# Patient Record
Sex: MALE | Race: WHITE | Employment: OTHER | URBAN - METROPOLITAN AREA
[De-identification: names, ages, dates, MRNs, and addresses within clinical notes are randomized per-mention and may not be internally consistent; named-entity substitution may affect disease eponyms.]

---

## 2017-01-13 ENCOUNTER — OFFICE VISIT (OUTPATIENT)
Dept: ORTHOPEDIC SURGERY | Age: 77
End: 2017-01-13

## 2017-01-13 VITALS — RESPIRATION RATE: 16 BRPM | BODY MASS INDEX: 29.82 KG/M2 | WEIGHT: 190 LBS | HEIGHT: 67 IN

## 2017-01-13 DIAGNOSIS — M72.0 DUPUYTREN'S CONTRACTURE: Primary | ICD-10-CM

## 2017-01-13 PROCEDURE — 4040F PNEUMOC VAC/ADMIN/RCVD: CPT | Performed by: ORTHOPAEDIC SURGERY

## 2017-01-13 PROCEDURE — 1123F ACP DISCUSS/DSCN MKR DOCD: CPT | Performed by: ORTHOPAEDIC SURGERY

## 2017-01-13 PROCEDURE — 99214 OFFICE O/P EST MOD 30 MIN: CPT | Performed by: ORTHOPAEDIC SURGERY

## 2017-01-13 PROCEDURE — G8484 FLU IMMUNIZE NO ADMIN: HCPCS | Performed by: ORTHOPAEDIC SURGERY

## 2017-01-13 PROCEDURE — G8427 DOCREV CUR MEDS BY ELIG CLIN: HCPCS | Performed by: ORTHOPAEDIC SURGERY

## 2017-01-13 PROCEDURE — 1036F TOBACCO NON-USER: CPT | Performed by: ORTHOPAEDIC SURGERY

## 2017-01-13 PROCEDURE — 3017F COLORECTAL CA SCREEN DOC REV: CPT | Performed by: ORTHOPAEDIC SURGERY

## 2017-01-13 PROCEDURE — G8420 CALC BMI NORM PARAMETERS: HCPCS | Performed by: ORTHOPAEDIC SURGERY

## 2017-03-01 ENCOUNTER — HOSPITAL ENCOUNTER (OUTPATIENT)
Dept: OTHER | Age: 77
Discharge: HOME | End: 2017-03-01
Attending: NURSE PRACTITIONER | Admitting: NURSE PRACTITIONER

## 2017-03-01 ENCOUNTER — OFFICE VISIT (OUTPATIENT)
Dept: FAMILY MEDICINE CLINIC | Age: 77
End: 2017-03-01

## 2017-03-01 VITALS
DIASTOLIC BLOOD PRESSURE: 58 MMHG | SYSTOLIC BLOOD PRESSURE: 128 MMHG | OXYGEN SATURATION: 98 % | HEART RATE: 69 BPM | RESPIRATION RATE: 18 BRPM | HEIGHT: 67 IN | BODY MASS INDEX: 29.98 KG/M2 | WEIGHT: 191 LBS

## 2017-03-01 DIAGNOSIS — I10 ESSENTIAL HYPERTENSION: Primary | ICD-10-CM

## 2017-03-01 DIAGNOSIS — M25.571 ACUTE RIGHT ANKLE PAIN: ICD-10-CM

## 2017-03-01 DIAGNOSIS — M25.471 RIGHT ANKLE SWELLING: ICD-10-CM

## 2017-03-01 DIAGNOSIS — R22.9 SKIN NODULE: ICD-10-CM

## 2017-03-01 DIAGNOSIS — E55.9 VITAMIN D DEFICIENCY: ICD-10-CM

## 2017-03-01 DIAGNOSIS — L98.9 BENIGN SKIN LESION OF NECK: ICD-10-CM

## 2017-03-01 DIAGNOSIS — R52 OTHER ACUTE PAIN: ICD-10-CM

## 2017-03-01 DIAGNOSIS — E78.00 HYPERCHOLESTEREMIA: ICD-10-CM

## 2017-03-01 DIAGNOSIS — R73.01 IMPAIRED FASTING GLUCOSE: ICD-10-CM

## 2017-03-01 LAB
A/G RATIO: 1.9 (ref 1.1–2.2)
ALBUMIN SERPL-MCNC: 4.1 G/DL (ref 3.4–5)
ALP BLD-CCNC: 65 U/L (ref 40–129)
ALT SERPL-CCNC: 17 U/L (ref 10–40)
ANION GAP SERPL CALCULATED.3IONS-SCNC: 13 MMOL/L (ref 3–16)
AST SERPL-CCNC: 19 U/L (ref 15–37)
BILIRUB SERPL-MCNC: 0.8 MG/DL (ref 0–1)
BUN BLDV-MCNC: 18 MG/DL (ref 7–20)
CALCIUM SERPL-MCNC: 9.5 MG/DL (ref 8.3–10.6)
CHLORIDE BLD-SCNC: 99 MMOL/L (ref 99–110)
CHOLESTEROL, TOTAL: 137 MG/DL (ref 0–199)
CO2: 28 MMOL/L (ref 21–32)
CREAT SERPL-MCNC: 0.9 MG/DL (ref 0.8–1.3)
ESTIMATED AVERAGE GLUCOSE: 114 MG/DL
GFR AFRICAN AMERICAN: >60
GFR NON-AFRICAN AMERICAN: >60
GLOBULIN: 2.2 G/DL
GLUCOSE BLD-MCNC: 110 MG/DL (ref 70–99)
HBA1C MFR BLD: 5.6 %
HDLC SERPL-MCNC: 68 MG/DL (ref 40–60)
LDL CHOLESTEROL CALCULATED: 58 MG/DL
POTASSIUM SERPL-SCNC: 4.1 MMOL/L (ref 3.5–5.1)
SODIUM BLD-SCNC: 140 MMOL/L (ref 136–145)
TOTAL PROTEIN: 6.3 G/DL (ref 6.4–8.2)
TRIGL SERPL-MCNC: 53 MG/DL (ref 0–150)
VITAMIN D 25-HYDROXY: 44.4 NG/ML
VLDLC SERPL CALC-MCNC: 11 MG/DL

## 2017-03-01 PROCEDURE — 17110 DESTRUCTION B9 LES UP TO 14: CPT | Performed by: NURSE PRACTITIONER

## 2017-03-01 PROCEDURE — 3017F COLORECTAL CA SCREEN DOC REV: CPT | Performed by: NURSE PRACTITIONER

## 2017-03-01 PROCEDURE — G8427 DOCREV CUR MEDS BY ELIG CLIN: HCPCS | Performed by: NURSE PRACTITIONER

## 2017-03-01 PROCEDURE — 36415 COLL VENOUS BLD VENIPUNCTURE: CPT | Performed by: NURSE PRACTITIONER

## 2017-03-01 PROCEDURE — 4040F PNEUMOC VAC/ADMIN/RCVD: CPT | Performed by: NURSE PRACTITIONER

## 2017-03-01 PROCEDURE — G8484 FLU IMMUNIZE NO ADMIN: HCPCS | Performed by: NURSE PRACTITIONER

## 2017-03-01 PROCEDURE — 1123F ACP DISCUSS/DSCN MKR DOCD: CPT | Performed by: NURSE PRACTITIONER

## 2017-03-01 PROCEDURE — 99214 OFFICE O/P EST MOD 30 MIN: CPT | Performed by: NURSE PRACTITIONER

## 2017-03-01 PROCEDURE — 1036F TOBACCO NON-USER: CPT | Performed by: NURSE PRACTITIONER

## 2017-03-01 PROCEDURE — G8420 CALC BMI NORM PARAMETERS: HCPCS | Performed by: NURSE PRACTITIONER

## 2017-03-01 RX ORDER — ENALAPRIL MALEATE 20 MG/1
TABLET ORAL
Qty: 180 TABLET | Refills: 3 | Status: SHIPPED | OUTPATIENT
Start: 2017-03-01 | End: 2018-03-07 | Stop reason: SDUPTHER

## 2017-03-01 RX ORDER — ETODOLAC 400 MG/1
400 TABLET, FILM COATED ORAL 2 TIMES DAILY
Qty: 60 TABLET | Refills: 3 | Status: SHIPPED | OUTPATIENT
Start: 2017-03-01 | End: 2017-03-01 | Stop reason: SDUPTHER

## 2017-03-01 RX ORDER — HYDROCHLOROTHIAZIDE 25 MG/1
TABLET ORAL
Qty: 90 TABLET | Refills: 3 | Status: SHIPPED | OUTPATIENT
Start: 2017-03-01 | End: 2018-03-07 | Stop reason: SDUPTHER

## 2017-03-01 RX ORDER — ETODOLAC 400 MG/1
400 TABLET, FILM COATED ORAL 2 TIMES DAILY
Qty: 180 TABLET | Refills: 1 | Status: SHIPPED | OUTPATIENT
Start: 2017-03-01 | End: 2017-07-05

## 2017-03-01 RX ORDER — LABETALOL 200 MG/1
TABLET, FILM COATED ORAL
Qty: 270 TABLET | Refills: 3 | Status: SHIPPED | OUTPATIENT
Start: 2017-03-01 | End: 2017-07-05 | Stop reason: DRUGHIGH

## 2017-03-01 RX ORDER — SIMVASTATIN 20 MG
TABLET ORAL
Qty: 90 TABLET | Refills: 3 | Status: SHIPPED | OUTPATIENT
Start: 2017-03-01 | End: 2018-03-07 | Stop reason: SDUPTHER

## 2017-03-03 DIAGNOSIS — M25.471 RIGHT ANKLE SWELLING: Primary | ICD-10-CM

## 2017-03-03 DIAGNOSIS — G89.29 CHRONIC PAIN OF RIGHT ANKLE: ICD-10-CM

## 2017-03-03 DIAGNOSIS — M25.571 CHRONIC PAIN OF RIGHT ANKLE: ICD-10-CM

## 2017-03-21 ENCOUNTER — OFFICE VISIT (OUTPATIENT)
Dept: ORTHOPEDIC SURGERY | Age: 77
End: 2017-03-21

## 2017-03-21 VITALS
BODY MASS INDEX: 29.98 KG/M2 | RESPIRATION RATE: 15 BRPM | HEIGHT: 67 IN | SYSTOLIC BLOOD PRESSURE: 137 MMHG | DIASTOLIC BLOOD PRESSURE: 58 MMHG | WEIGHT: 191 LBS | HEART RATE: 57 BPM

## 2017-03-21 DIAGNOSIS — M19.079 ANKLE ARTHRITIS: Primary | ICD-10-CM

## 2017-03-21 PROCEDURE — 1036F TOBACCO NON-USER: CPT | Performed by: ORTHOPAEDIC SURGERY

## 2017-03-21 PROCEDURE — G8420 CALC BMI NORM PARAMETERS: HCPCS | Performed by: ORTHOPAEDIC SURGERY

## 2017-03-21 PROCEDURE — G8484 FLU IMMUNIZE NO ADMIN: HCPCS | Performed by: ORTHOPAEDIC SURGERY

## 2017-03-21 PROCEDURE — G8427 DOCREV CUR MEDS BY ELIG CLIN: HCPCS | Performed by: ORTHOPAEDIC SURGERY

## 2017-03-21 PROCEDURE — 3017F COLORECTAL CA SCREEN DOC REV: CPT | Performed by: ORTHOPAEDIC SURGERY

## 2017-03-21 PROCEDURE — 4040F PNEUMOC VAC/ADMIN/RCVD: CPT | Performed by: ORTHOPAEDIC SURGERY

## 2017-03-21 PROCEDURE — 99213 OFFICE O/P EST LOW 20 MIN: CPT | Performed by: ORTHOPAEDIC SURGERY

## 2017-06-06 ENCOUNTER — OFFICE VISIT (OUTPATIENT)
Dept: ORTHOPEDIC SURGERY | Age: 77
End: 2017-06-06

## 2017-06-06 VITALS — WEIGHT: 191 LBS | HEIGHT: 67 IN | BODY MASS INDEX: 29.98 KG/M2

## 2017-06-06 DIAGNOSIS — M19.079 ANKLE ARTHRITIS: Primary | ICD-10-CM

## 2017-06-06 PROCEDURE — 99214 OFFICE O/P EST MOD 30 MIN: CPT | Performed by: ORTHOPAEDIC SURGERY

## 2017-06-06 PROCEDURE — 3017F COLORECTAL CA SCREEN DOC REV: CPT | Performed by: ORTHOPAEDIC SURGERY

## 2017-06-06 PROCEDURE — 1036F TOBACCO NON-USER: CPT | Performed by: ORTHOPAEDIC SURGERY

## 2017-06-06 PROCEDURE — 20605 DRAIN/INJ JOINT/BURSA W/O US: CPT | Performed by: ORTHOPAEDIC SURGERY

## 2017-06-06 PROCEDURE — G8427 DOCREV CUR MEDS BY ELIG CLIN: HCPCS | Performed by: ORTHOPAEDIC SURGERY

## 2017-06-06 PROCEDURE — G8420 CALC BMI NORM PARAMETERS: HCPCS | Performed by: ORTHOPAEDIC SURGERY

## 2017-06-06 PROCEDURE — 4040F PNEUMOC VAC/ADMIN/RCVD: CPT | Performed by: ORTHOPAEDIC SURGERY

## 2017-06-06 PROCEDURE — 1123F ACP DISCUSS/DSCN MKR DOCD: CPT | Performed by: ORTHOPAEDIC SURGERY

## 2017-07-05 ENCOUNTER — OFFICE VISIT (OUTPATIENT)
Dept: FAMILY MEDICINE CLINIC | Age: 77
End: 2017-07-05

## 2017-07-05 VITALS
OXYGEN SATURATION: 98 % | HEIGHT: 67 IN | HEART RATE: 71 BPM | SYSTOLIC BLOOD PRESSURE: 110 MMHG | RESPIRATION RATE: 16 BRPM | DIASTOLIC BLOOD PRESSURE: 58 MMHG | WEIGHT: 194 LBS | BODY MASS INDEX: 30.45 KG/M2

## 2017-07-05 DIAGNOSIS — R20.9 DISTURBANCE OF SKIN SENSATION: ICD-10-CM

## 2017-07-05 DIAGNOSIS — M72.2 PLANTAR FASCIITIS: ICD-10-CM

## 2017-07-05 DIAGNOSIS — I10 ESSENTIAL HYPERTENSION: Primary | ICD-10-CM

## 2017-07-05 DIAGNOSIS — L82.1 SK (SEBORRHEIC KERATOSIS): ICD-10-CM

## 2017-07-05 PROCEDURE — 3017F COLORECTAL CA SCREEN DOC REV: CPT | Performed by: NURSE PRACTITIONER

## 2017-07-05 PROCEDURE — G8427 DOCREV CUR MEDS BY ELIG CLIN: HCPCS | Performed by: NURSE PRACTITIONER

## 2017-07-05 PROCEDURE — 1123F ACP DISCUSS/DSCN MKR DOCD: CPT | Performed by: NURSE PRACTITIONER

## 2017-07-05 PROCEDURE — 1036F TOBACCO NON-USER: CPT | Performed by: NURSE PRACTITIONER

## 2017-07-05 PROCEDURE — 4040F PNEUMOC VAC/ADMIN/RCVD: CPT | Performed by: NURSE PRACTITIONER

## 2017-07-05 PROCEDURE — 17110 DESTRUCTION B9 LES UP TO 14: CPT | Performed by: NURSE PRACTITIONER

## 2017-07-05 PROCEDURE — G8417 CALC BMI ABV UP PARAM F/U: HCPCS | Performed by: NURSE PRACTITIONER

## 2017-07-05 PROCEDURE — 99213 OFFICE O/P EST LOW 20 MIN: CPT | Performed by: NURSE PRACTITIONER

## 2017-07-05 RX ORDER — LABETALOL 200 MG/1
TABLET, FILM COATED ORAL
Qty: 270 TABLET | Refills: 3 | Status: SHIPPED
Start: 2017-07-05 | End: 2018-03-07 | Stop reason: SDUPTHER

## 2017-07-05 ASSESSMENT — PATIENT HEALTH QUESTIONNAIRE - PHQ9
SUM OF ALL RESPONSES TO PHQ QUESTIONS 1-9: 0
SUM OF ALL RESPONSES TO PHQ9 QUESTIONS 1 & 2: 0
1. LITTLE INTEREST OR PLEASURE IN DOING THINGS: 0
2. FEELING DOWN, DEPRESSED OR HOPELESS: 0

## 2017-08-10 ENCOUNTER — OFFICE VISIT (OUTPATIENT)
Dept: ORTHOPEDIC SURGERY | Age: 77
End: 2017-08-10

## 2017-08-10 VITALS
DIASTOLIC BLOOD PRESSURE: 64 MMHG | BODY MASS INDEX: 30.45 KG/M2 | SYSTOLIC BLOOD PRESSURE: 132 MMHG | WEIGHT: 194 LBS | RESPIRATION RATE: 15 BRPM | HEART RATE: 58 BPM | HEIGHT: 67 IN

## 2017-08-10 DIAGNOSIS — M76.61 ACHILLES TENDINITIS OF RIGHT LOWER EXTREMITY: ICD-10-CM

## 2017-08-10 DIAGNOSIS — M19.079 ANKLE ARTHRITIS: Primary | ICD-10-CM

## 2017-08-10 PROCEDURE — G8427 DOCREV CUR MEDS BY ELIG CLIN: HCPCS | Performed by: ORTHOPAEDIC SURGERY

## 2017-08-10 PROCEDURE — 1036F TOBACCO NON-USER: CPT | Performed by: ORTHOPAEDIC SURGERY

## 2017-08-10 PROCEDURE — 4040F PNEUMOC VAC/ADMIN/RCVD: CPT | Performed by: ORTHOPAEDIC SURGERY

## 2017-08-10 PROCEDURE — 99214 OFFICE O/P EST MOD 30 MIN: CPT | Performed by: ORTHOPAEDIC SURGERY

## 2017-08-10 PROCEDURE — G8417 CALC BMI ABV UP PARAM F/U: HCPCS | Performed by: ORTHOPAEDIC SURGERY

## 2017-08-10 PROCEDURE — 3017F COLORECTAL CA SCREEN DOC REV: CPT | Performed by: ORTHOPAEDIC SURGERY

## 2017-08-10 PROCEDURE — 1123F ACP DISCUSS/DSCN MKR DOCD: CPT | Performed by: ORTHOPAEDIC SURGERY

## 2017-08-24 ENCOUNTER — HOSPITAL ENCOUNTER (OUTPATIENT)
Dept: PHYSICAL THERAPY | Age: 77
Discharge: HOME | End: 2017-08-31
Admitting: ORTHOPAEDIC SURGERY

## 2017-08-24 ASSESSMENT — PAIN DESCRIPTION - LOCATION: LOCATION: ANKLE

## 2017-08-24 ASSESSMENT — PAIN SCALES - GENERAL: PAINLEVEL_OUTOF10: 2

## 2017-08-24 ASSESSMENT — PAIN DESCRIPTION - PAIN TYPE: TYPE: CHRONIC PAIN

## 2017-10-01 ENCOUNTER — HOSPITAL ENCOUNTER (OUTPATIENT)
Dept: OTHER | Age: 77
Discharge: HOME | End: 2017-10-31
Attending: ORTHOPAEDIC SURGERY | Admitting: ORTHOPAEDIC SURGERY

## 2017-10-04 ENCOUNTER — OFFICE VISIT (OUTPATIENT)
Dept: FAMILY MEDICINE CLINIC | Age: 77
End: 2017-10-04

## 2017-10-04 VITALS
DIASTOLIC BLOOD PRESSURE: 70 MMHG | WEIGHT: 193 LBS | BODY MASS INDEX: 30.29 KG/M2 | HEART RATE: 82 BPM | HEIGHT: 67 IN | SYSTOLIC BLOOD PRESSURE: 118 MMHG | RESPIRATION RATE: 16 BRPM

## 2017-10-04 DIAGNOSIS — I10 ESSENTIAL HYPERTENSION: Primary | ICD-10-CM

## 2017-10-04 DIAGNOSIS — Z23 NEED FOR INFLUENZA VACCINATION: ICD-10-CM

## 2017-10-04 PROCEDURE — 1036F TOBACCO NON-USER: CPT | Performed by: NURSE PRACTITIONER

## 2017-10-04 PROCEDURE — G8484 FLU IMMUNIZE NO ADMIN: HCPCS | Performed by: NURSE PRACTITIONER

## 2017-10-04 PROCEDURE — 99213 OFFICE O/P EST LOW 20 MIN: CPT | Performed by: NURSE PRACTITIONER

## 2017-10-04 PROCEDURE — G0008 ADMIN INFLUENZA VIRUS VAC: HCPCS | Performed by: NURSE PRACTITIONER

## 2017-10-04 PROCEDURE — 3017F COLORECTAL CA SCREEN DOC REV: CPT | Performed by: NURSE PRACTITIONER

## 2017-10-04 PROCEDURE — 90662 IIV NO PRSV INCREASED AG IM: CPT | Performed by: NURSE PRACTITIONER

## 2017-10-04 PROCEDURE — G8427 DOCREV CUR MEDS BY ELIG CLIN: HCPCS | Performed by: NURSE PRACTITIONER

## 2017-10-04 PROCEDURE — G8417 CALC BMI ABV UP PARAM F/U: HCPCS | Performed by: NURSE PRACTITIONER

## 2017-10-04 PROCEDURE — 1123F ACP DISCUSS/DSCN MKR DOCD: CPT | Performed by: NURSE PRACTITIONER

## 2017-10-04 PROCEDURE — 4040F PNEUMOC VAC/ADMIN/RCVD: CPT | Performed by: NURSE PRACTITIONER

## 2017-10-04 NOTE — MR AVS SNAPSHOT
After Visit Summary             Vivek Renae   10/4/2017 8:45 AM   Office Visit    Description:  Male : 1940   Provider:  Christoph Dontao CNP   Department:  [address] [place] [address] Drive and Future Appointments         Below is a list of your follow-up and future appointments. This may not be a complete list as you may have made appointments directly with providers that we are not aware of or your providers may have made some for you. Please call your providers to confirm appointments. It is important to keep your appointments. Please bring your current insurance card, photo ID, co-pay, and all medication bottles to your appointment. If self-pay, payment is expected at the time of service. Your To-Do List     Future Appointments Provider Department Dept Phone    10/4/2017 4:00 PM Peterson Holloway PT [facility] [phone]    10/9/2017 3:30 PM Shiv Reyna PT [facility] [phone]    10/11/2017 4:00 PM Peterson Holloway PT [facility] [phone]    10/16/2017 4:00 PM Shiv Reyna PT [facility] [phone]    10/18/2017 4:00 PM Peterson Holloway PT [facility] [phone]    Follow-Up    Return in about 6 months (around 2018) for Hypertension. Information from Your Visit        Department     Name Address Phone Fax    [address] [phone]      You Were Seen for:         Comments    Essential hypertension   [949874]         Vital Signs     Blood Pressure Pulse Respirations Height Weight Body Mass Index    118/70 (Site: Left Arm, Position: Sitting, Cuff Size: Medium Adult) 82 16 5' 7\" (1.702 m) 193 lb (87.5 kg) 30.23 kg/m2    Smoking Status                   Never Smoker           Additional Information about your Body Mass Index (BMI)           Your BMI as listed above is considered obese (30 or more).  BMI is an Hypercholesteremia    Aortic regurgitation    Impaired fasting glucose    Environmental allergies    Ankle arthritis, right    Dupuytren's contracture      Your Goals as of 10/4/2017 at 9:14 AM                 Exercise    Exercise 3x per week (30 min per time) 3/1/17     Notes    Self- Management Goals for the Patient with Hypertension: It is important to have goals to work towards when you have Hypertension. Below is a list of goals your doctor would like you to work towards to help control your hypertension and also maintain and improve your overall health. Please select one of these goals to try before your next follow up visit:    Goal: I will increase physical activity level, as follows: currently three times a week, work out at [employer], will try to increase to four times a week    Guess your barriers before they happen. Everyone runs into barriers to their goals. You may already know what's going to get in your way. Write down these problems (cost? time? stress? fear?), and think of ways to get around them. Barriers to success: none  Plan for overcoming my barriers: N/A     Confidence: 7/10  Date goal set: 10/08/2014  Date goal attained:              Immunizations as of 10/4/2017     Name Date    Influenza Virus Vaccine 10/1/2010    Influenza, High Dose 9/28/2016, 9/30/2015, 10/8/2014, 11/20/2013, 10/3/2012, 12/7/2011    Pneumococcal 13-valent Conjugate (Fegswrr21) 1/28/2015    Pneumococcal Polysaccharide ([id number]) 8/22/2007    Td 3/10/2010, 6/28/2000    Zoster 10/10/2009      Preventive Care        Date Due    Tetanus Combination Vaccine (1 - Tdap) 3/11/2010    Yearly Flu Vaccine (1) 9/1/2017    Cholesterol Screening 3/1/2022    Colonoscopy 1/5/2027            MyChart Signup           Our records indicate that you have an active [facility] account. You can view your After Visit Summary by going to [email or web address] and logging in with your

## 2017-10-04 NOTE — PROGRESS NOTES
Darrell Sánchez  78 y.o. male    1940      CC: HTN follow up  Chief Complaint   Patient presents with    Hypertension     3 MO HTN ROUITNE FOLLOW UP NO PCMH NEEDED     HPI  /70 today. States he does not check outside of here. Denies side effects of the medication. Denies dizziness, headaches, blurred vision, and chest pain. 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 Chronic pain in right ankle/foot with swelling - chronic. Many sprains over years. Denies numbness/tingling in that foot. Therapy for that foot. No Known Allergies    Physical  Examination    Physical Exam   Constitutional: He is oriented to person, place, and time and well-developed, well-nourished, and in no distress. HENT:   Head: Normocephalic and atraumatic. Neck: Normal range of motion. Cardiovascular: Normal rate, regular rhythm, S1 normal, S2 normal, normal heart sounds, intact distal pulses and normal pulses. Exam reveals no gallop and no friction rub. No murmur heard. Pulmonary/Chest: Effort normal and breath sounds normal. No respiratory distress. He has no wheezes. He has no rales. Musculoskeletal: He exhibits edema and deformity. Chronic right ankle injury/swelling. Works with therapy. Does not want surgery. Neurological: He is alert and oriented to person, place, and time. Gait normal.   Skin: Skin is warm and dry. Psychiatric: Mood, memory, affect and judgment normal.   Vitals reviewed. Vitals:    10/04/17 0832   BP: 118/70   Site: Left Arm   Position: Sitting   Cuff Size: Medium Adult   Pulse: 82   Resp: 16   Weight: 193 lb (87.5 kg)   Height: 5' 7\" (1.702 m)     Body mass index is 30.23 kg/(m^2).      Wt Readings from Last 3 Encounters:   10/04/17 193 lb (87.5 kg)   08/10/17 194 lb (88 kg)   07/05/17 194 lb (88 kg)     BP Readings from Last 3 Encounters:   10/04/17 118/70   08/10/17 132/64   07/05/17 (!) 110/58        No results found for this visit on

## 2017-11-01 ENCOUNTER — HOSPITAL ENCOUNTER (OUTPATIENT)
Dept: OTHER | Age: 77
Discharge: HOME | End: 2017-11-30
Attending: ORTHOPAEDIC SURGERY | Admitting: ORTHOPAEDIC SURGERY

## 2017-12-01 ENCOUNTER — HOSPITAL ENCOUNTER (OUTPATIENT)
Dept: OTHER | Age: 77
Discharge: HOME | End: 2017-12-31
Attending: ORTHOPAEDIC SURGERY | Admitting: ORTHOPAEDIC SURGERY

## 2018-03-07 ENCOUNTER — OFFICE VISIT (OUTPATIENT)
Dept: FAMILY MEDICINE CLINIC | Age: 78
End: 2018-03-07

## 2018-03-07 VITALS
RESPIRATION RATE: 12 BRPM | HEART RATE: 70 BPM | DIASTOLIC BLOOD PRESSURE: 68 MMHG | HEIGHT: 67 IN | SYSTOLIC BLOOD PRESSURE: 136 MMHG | BODY MASS INDEX: 30.76 KG/M2 | WEIGHT: 196 LBS

## 2018-03-07 DIAGNOSIS — E78.00 HYPERCHOLESTEREMIA: ICD-10-CM

## 2018-03-07 DIAGNOSIS — I10 ESSENTIAL HYPERTENSION: ICD-10-CM

## 2018-03-07 DIAGNOSIS — M19.079 ANKLE ARTHRITIS: ICD-10-CM

## 2018-03-07 DIAGNOSIS — E87.5 HYPERKALEMIA: Primary | ICD-10-CM

## 2018-03-07 DIAGNOSIS — E55.9 VITAMIN D DEFICIENCY: ICD-10-CM

## 2018-03-07 DIAGNOSIS — H61.21 HEARING LOSS OF RIGHT EAR DUE TO CERUMEN IMPACTION: ICD-10-CM

## 2018-03-07 DIAGNOSIS — R73.01 IMPAIRED FASTING GLUCOSE: Primary | ICD-10-CM

## 2018-03-07 PROBLEM — M19.071 ARTHRITIS OF RIGHT ANKLE: Status: ACTIVE | Noted: 2018-03-07

## 2018-03-07 LAB
A/G RATIO: 1.9 (ref 1.1–2.2)
ALBUMIN SERPL-MCNC: 4.4 G/DL (ref 3.4–5)
ALP BLD-CCNC: 68 U/L (ref 40–129)
ALT SERPL-CCNC: 16 U/L (ref 10–40)
ANION GAP SERPL CALCULATED.3IONS-SCNC: 8 MMOL/L (ref 3–16)
AST SERPL-CCNC: 19 U/L (ref 15–37)
BILIRUB SERPL-MCNC: 0.7 MG/DL (ref 0–1)
BUN BLDV-MCNC: 17 MG/DL (ref 7–20)
CALCIUM SERPL-MCNC: 9.6 MG/DL (ref 8.3–10.6)
CHLORIDE BLD-SCNC: 102 MMOL/L (ref 99–110)
CHOLESTEROL, TOTAL: 146 MG/DL (ref 0–199)
CO2: 31 MMOL/L (ref 21–32)
CREAT SERPL-MCNC: 0.8 MG/DL (ref 0.8–1.3)
ESTIMATED AVERAGE GLUCOSE: 116.9 MG/DL
GFR AFRICAN AMERICAN: >60
GFR NON-AFRICAN AMERICAN: >60
GLOBULIN: 2.3 G/DL
GLUCOSE BLD-MCNC: 118 MG/DL (ref 70–99)
HBA1C MFR BLD: 5.7 %
HDLC SERPL-MCNC: 73 MG/DL (ref 40–60)
LDL CHOLESTEROL CALCULATED: 63 MG/DL
POTASSIUM SERPL-SCNC: 5.7 MMOL/L (ref 3.5–5.1)
SODIUM BLD-SCNC: 141 MMOL/L (ref 136–145)
TOTAL PROTEIN: 6.7 G/DL (ref 6.4–8.2)
TRIGL SERPL-MCNC: 49 MG/DL (ref 0–150)
VITAMIN D 25-HYDROXY: 44 NG/ML
VLDLC SERPL CALC-MCNC: 10 MG/DL

## 2018-03-07 PROCEDURE — 4040F PNEUMOC VAC/ADMIN/RCVD: CPT | Performed by: NURSE PRACTITIONER

## 2018-03-07 PROCEDURE — 99213 OFFICE O/P EST LOW 20 MIN: CPT | Performed by: NURSE PRACTITIONER

## 2018-03-07 PROCEDURE — 36415 COLL VENOUS BLD VENIPUNCTURE: CPT | Performed by: NURSE PRACTITIONER

## 2018-03-07 PROCEDURE — 1036F TOBACCO NON-USER: CPT | Performed by: NURSE PRACTITIONER

## 2018-03-07 PROCEDURE — 1123F ACP DISCUSS/DSCN MKR DOCD: CPT | Performed by: NURSE PRACTITIONER

## 2018-03-07 PROCEDURE — G8417 CALC BMI ABV UP PARAM F/U: HCPCS | Performed by: NURSE PRACTITIONER

## 2018-03-07 PROCEDURE — G8427 DOCREV CUR MEDS BY ELIG CLIN: HCPCS | Performed by: NURSE PRACTITIONER

## 2018-03-07 PROCEDURE — 3017F COLORECTAL CA SCREEN DOC REV: CPT | Performed by: NURSE PRACTITIONER

## 2018-03-07 PROCEDURE — G8484 FLU IMMUNIZE NO ADMIN: HCPCS | Performed by: NURSE PRACTITIONER

## 2018-03-07 PROCEDURE — 69210 REMOVE IMPACTED EAR WAX UNI: CPT | Performed by: NURSE PRACTITIONER

## 2018-03-07 RX ORDER — SIMVASTATIN 20 MG
TABLET ORAL
Qty: 90 TABLET | Refills: 3 | Status: SHIPPED | OUTPATIENT
Start: 2018-03-07 | End: 2019-03-08 | Stop reason: SDUPTHER

## 2018-03-07 RX ORDER — ENALAPRIL MALEATE 20 MG/1
TABLET ORAL
Qty: 180 TABLET | Refills: 3 | Status: SHIPPED | OUTPATIENT
Start: 2018-03-07 | End: 2019-03-08 | Stop reason: SDUPTHER

## 2018-03-07 RX ORDER — HYDROCHLOROTHIAZIDE 25 MG/1
TABLET ORAL
Qty: 90 TABLET | Refills: 3 | Status: SHIPPED | OUTPATIENT
Start: 2018-03-07 | End: 2019-03-08 | Stop reason: SDUPTHER

## 2018-03-07 RX ORDER — SODIUM POLYSTYRENE SULFONATE 15 G/60ML
15 SUSPENSION ORAL; RECTAL ONCE
Qty: 1 BOTTLE | Refills: 0 | Status: SHIPPED | OUTPATIENT
Start: 2018-03-07 | End: 2018-08-23 | Stop reason: ALTCHOICE

## 2018-03-07 RX ORDER — LABETALOL 200 MG/1
TABLET, FILM COATED ORAL
Qty: 270 TABLET | Refills: 3 | Status: SHIPPED | OUTPATIENT
Start: 2018-03-07 | End: 2019-03-08 | Stop reason: SDUPTHER

## 2018-03-07 NOTE — PROGRESS NOTES
rales.   Musculoskeletal: He exhibits edema. Significant ankle edema on the right  Significant range of motion reduced right ankle  Tender over Achilles, and lower gastrocnemius. Pain over the entire ankle. Left ankle is normal   Lymphadenopathy:        Head (right side): No submental, no submandibular and no tonsillar adenopathy present. Head (left side): No submental, no submandibular and no tonsillar adenopathy present. He has no cervical adenopathy. Neurological: He is alert and oriented to person, place, and time. Skin: Skin is warm and dry. He is not diaphoretic. Psychiatric: Mood, memory, affect and judgment normal.   Nursing note and vitals reviewed. Vitals:    03/07/18 0831   BP: 136/68   Site: Left Arm   Position: Sitting   Cuff Size: Medium Adult   Pulse: 70   Resp: 12   Weight: 196 lb (88.9 kg)   Height: 5' 7\" (1.702 m)     Body mass index is 30.7 kg/m². Wt Readings from Last 3 Encounters:   03/07/18 196 lb (88.9 kg)   10/04/17 193 lb (87.5 kg)   08/10/17 194 lb (88 kg)     BP Readings from Last 3 Encounters:   03/07/18 136/68   10/04/17 118/70   08/10/17 132/64        No results found for this visit on 03/07/18. Assessment    1. Impaired fasting glucose  Hemoglobin A1C   2. Essential hypertension  hydrochlorothiazide (HYDRODIURIL) 25 MG tablet    enalapril (VASOTEC) 20 MG tablet    labetalol (NORMODYNE) 200 MG tablet    Comprehensive Metabolic Panel   3. Hypercholesteremia  simvastatin (ZOCOR) 20 MG tablet    Comprehensive Metabolic Panel    Lipid Panel   4. Vitamin D deficiency  Vitamin D 25 Hydroxy   5. Hearing loss of right ear due to cerumen impaction  52081 - TN REMOVE IMPACTED EAR WAX   6. Ankle arthritis, right           Plan    Right ear(s) cerumen impaction indicated by loss of hearing, and full of cerumen. Cerumen removed by flushing and instrumentation with wax curette. Otoscope magnification was also used during the process.  The flushing was completed with

## 2018-03-12 ENCOUNTER — TELEPHONE (OUTPATIENT)
Dept: FAMILY MEDICINE CLINIC | Age: 78
End: 2018-03-12

## 2018-03-13 ENCOUNTER — NURSE ONLY (OUTPATIENT)
Dept: FAMILY MEDICINE CLINIC | Age: 78
End: 2018-03-13

## 2018-03-13 DIAGNOSIS — E87.5 HYPERKALEMIA: ICD-10-CM

## 2018-03-13 LAB — POTASSIUM SERPL-SCNC: 4.3 MMOL/L (ref 3.5–5.1)

## 2018-03-13 PROCEDURE — 36415 COLL VENOUS BLD VENIPUNCTURE: CPT | Performed by: NURSE PRACTITIONER

## 2018-08-23 ENCOUNTER — OFFICE VISIT (OUTPATIENT)
Dept: FAMILY MEDICINE CLINIC | Age: 78
End: 2018-08-23

## 2018-08-23 VITALS
WEIGHT: 202.4 LBS | DIASTOLIC BLOOD PRESSURE: 64 MMHG | HEIGHT: 67 IN | BODY MASS INDEX: 31.77 KG/M2 | OXYGEN SATURATION: 98 % | HEART RATE: 65 BPM | SYSTOLIC BLOOD PRESSURE: 112 MMHG | RESPIRATION RATE: 16 BRPM

## 2018-08-23 DIAGNOSIS — B07.9 VIRAL WARTS, UNSPECIFIED TYPE: Primary | ICD-10-CM

## 2018-08-23 DIAGNOSIS — R20.9 DISTURBANCE OF SKIN SENSATION: ICD-10-CM

## 2018-08-23 PROCEDURE — 17110 DESTRUCTION B9 LES UP TO 14: CPT | Performed by: NURSE PRACTITIONER

## 2018-08-23 ASSESSMENT — PATIENT HEALTH QUESTIONNAIRE - PHQ9
1. LITTLE INTEREST OR PLEASURE IN DOING THINGS: 0
SUM OF ALL RESPONSES TO PHQ9 QUESTIONS 1 & 2: 0
2. FEELING DOWN, DEPRESSED OR HOPELESS: 0
SUM OF ALL RESPONSES TO PHQ QUESTIONS 1-9: 0
SUM OF ALL RESPONSES TO PHQ QUESTIONS 1-9: 0

## 2018-09-07 ENCOUNTER — OFFICE VISIT (OUTPATIENT)
Dept: FAMILY MEDICINE CLINIC | Age: 78
End: 2018-09-07

## 2018-09-07 ENCOUNTER — PATIENT MESSAGE (OUTPATIENT)
Dept: FAMILY MEDICINE CLINIC | Age: 78
End: 2018-09-07

## 2018-09-07 VITALS
HEIGHT: 67 IN | WEIGHT: 199.4 LBS | RESPIRATION RATE: 16 BRPM | SYSTOLIC BLOOD PRESSURE: 112 MMHG | BODY MASS INDEX: 31.3 KG/M2 | OXYGEN SATURATION: 97 % | DIASTOLIC BLOOD PRESSURE: 68 MMHG | HEART RATE: 84 BPM

## 2018-09-07 DIAGNOSIS — E78.00 HYPERCHOLESTEREMIA: ICD-10-CM

## 2018-09-07 DIAGNOSIS — L82.1 SK (SEBORRHEIC KERATOSIS): ICD-10-CM

## 2018-09-07 DIAGNOSIS — Z23 NEED FOR INFLUENZA VACCINATION: ICD-10-CM

## 2018-09-07 DIAGNOSIS — M76.61 ACHILLES TENDINITIS OF RIGHT LOWER EXTREMITY: ICD-10-CM

## 2018-09-07 DIAGNOSIS — E87.5 HYPERKALEMIA: ICD-10-CM

## 2018-09-07 DIAGNOSIS — R20.9 DISTURBANCE OF SKIN SENSATION: ICD-10-CM

## 2018-09-07 DIAGNOSIS — M19.071 ARTHRITIS OF RIGHT ANKLE: ICD-10-CM

## 2018-09-07 DIAGNOSIS — I10 ESSENTIAL HYPERTENSION: Primary | ICD-10-CM

## 2018-09-07 PROCEDURE — 4040F PNEUMOC VAC/ADMIN/RCVD: CPT | Performed by: NURSE PRACTITIONER

## 2018-09-07 PROCEDURE — 90662 IIV NO PRSV INCREASED AG IM: CPT | Performed by: NURSE PRACTITIONER

## 2018-09-07 PROCEDURE — 99214 OFFICE O/P EST MOD 30 MIN: CPT | Performed by: NURSE PRACTITIONER

## 2018-09-07 PROCEDURE — 1123F ACP DISCUSS/DSCN MKR DOCD: CPT | Performed by: NURSE PRACTITIONER

## 2018-09-07 PROCEDURE — 1101F PT FALLS ASSESS-DOCD LE1/YR: CPT | Performed by: NURSE PRACTITIONER

## 2018-09-07 PROCEDURE — G8417 CALC BMI ABV UP PARAM F/U: HCPCS | Performed by: NURSE PRACTITIONER

## 2018-09-07 PROCEDURE — 1036F TOBACCO NON-USER: CPT | Performed by: NURSE PRACTITIONER

## 2018-09-07 PROCEDURE — G8427 DOCREV CUR MEDS BY ELIG CLIN: HCPCS | Performed by: NURSE PRACTITIONER

## 2018-09-07 PROCEDURE — G0008 ADMIN INFLUENZA VIRUS VAC: HCPCS | Performed by: NURSE PRACTITIONER

## 2018-09-07 RX ORDER — CELECOXIB 200 MG/1
200 CAPSULE ORAL DAILY
Qty: 30 CAPSULE | Refills: 11 | Status: SHIPPED | OUTPATIENT
Start: 2018-09-07 | End: 2019-03-08 | Stop reason: SDUPTHER

## 2018-09-07 NOTE — PROGRESS NOTES
Alonso Cowart  70 y.o. male    1940      CC: Hypertension  Ankle pain  Skin lesion follow up    Chief Complaint   Patient presents with    Hypertension     ROUTINE HTN FOLLOW UP     Ankle Pain     WAS SEEING A SPECIALIST FOR HIS RIGHT ANKLE BUT WOULD LIKE TO DISCUSS WITH DELORIS, HE STATES IT IS JUST OLD, BEEN GOING ON 3 YEARS. HPI    Ankle pain - gets soreness with pressure on the heel driving at times, and with walking. The motion is reduced and has concern for falls. Saw specialist and they wanted to do surgery and he did not want to do that. He does have foot and able place around the corner from him. Therapy really did help. When quit therapy, quit doing the exercises. Had one cortisone injection and only helped for a couple of days. Pain at the lateral ankle into the heel. Has arthritis and tendonitis. Has had feet kicking at night - while sleeping - he does not know he is doing it - wife is noting this. No pain in the legs or calves. Blood pressure is great today. Did not take med today. No real change with the cut back of the medication. Exercise. Lifts 3-400 lb meat 3-4 times weekly. Y - rode bike for 3 miles and walked for a mile. Has ankle pain the whole time. Left arm lesion. - This was SK that was cryo treated x3 and is resolving, but not fully cleared. Was picking at lesion, but not at this time. Could easily start picking at it again. No Known Allergies    Physical  Examination    Physical Exam   Constitutional: He is oriented to person, place, and time and well-developed, well-nourished, and in no distress. No distress. HENT:   Head: Normocephalic. Neck: Carotid bruit is not present. Cardiovascular: Normal rate and regular rhythm. Exam reveals no gallop and no friction rub. Murmur heard. Systolic murmur is present with a grade of 4/6   Pulmonary/Chest: Effort normal. No accessory muscle usage. No respiratory distress.  He has no decreased

## 2018-09-26 ENCOUNTER — HOSPITAL ENCOUNTER (OUTPATIENT)
Dept: PHYSICAL THERAPY | Age: 78
Setting detail: THERAPIES SERIES
Discharge: HOME | End: 2018-09-26
Payer: MEDICARE

## 2018-09-26 PROCEDURE — 97110 THERAPEUTIC EXERCISES: CPT

## 2018-09-26 PROCEDURE — 97530 THERAPEUTIC ACTIVITIES: CPT

## 2018-09-26 PROCEDURE — 97161 PT EVAL LOW COMPLEX 20 MIN: CPT

## 2018-09-26 PROCEDURE — G8978 MOBILITY CURRENT STATUS: HCPCS

## 2018-09-26 PROCEDURE — 97140 MANUAL THERAPY 1/> REGIONS: CPT

## 2018-09-26 PROCEDURE — G8979 MOBILITY GOAL STATUS: HCPCS

## 2018-09-26 ASSESSMENT — PAIN DESCRIPTION - ORIENTATION: ORIENTATION: RIGHT

## 2018-09-26 ASSESSMENT — PAIN DESCRIPTION - DESCRIPTORS: DESCRIPTORS: ACHING

## 2018-09-26 ASSESSMENT — PAIN SCALES - GENERAL: PAINLEVEL_OUTOF10: 9

## 2018-09-26 ASSESSMENT — PAIN DESCRIPTION - PAIN TYPE: TYPE: CHRONIC PAIN

## 2018-09-26 ASSESSMENT — PAIN DESCRIPTION - LOCATION: LOCATION: ANKLE

## 2018-09-26 NOTE — PLAN OF CARE
Outpatient Physical Therapy     Phone: [phone] Fax: [phone]     To: Referring Practitioner: Kristie Fletcher      Patient: Tanvir Luis   : 1940   MRN: [id number]  Evaluation Date: 2018      Diagnosis Information:  · Diagnosis: R ankle Arthritis   · Treatment Diagnosis: decreased neuromuscular control of the R ankle, decreased balance, and decreased R DF and great toe ROM     Physical Therapy Certification/Re-Certification Form  Dear Dr. Carol De Jesus,   The following patient has been evaluated for physical therapy services. Please review the attached evaluation and/or summary of the patient's plan of care, and verify that you agree therapy should continue by signing the attached document and sending it back to our office. Plan of Care/Treatment to date:  [x] Therapeutic Exercise      [x] Modalities:  [x] Therapeutic Activity        [] Ultrasound    [x] Gait Training        [] Cervical Traction   [x] Neuromuscular Re-education      [x] Cold/hotpack    [x] Instruction in HEP        [] Lumbar Traction  [x] Manual Therapy        [x] Electrical Stimulation            [] Aquatic Therapy        [] Iontophoresis        ? [] Lymphedema management  [] Women's Health     Other:  [] Vestibular Rehab        []    []  Needed     Frequency/Duration:  # Days per week: [] 1 day # Weeks: [] 1 week [] 5 weeks     [x] 2 days? [] 2 weeks [x] 6 weeks     [] 3 days   [] 3 weeks [] 7 weeks     [] 4 days   [] 4 weeks [] 8 weeks    Rehab Potential: [] Excellent [x] Good [] Fair  [] Poor     Electronically signed by:  Leon Quintero PT, DPT    If you have any questions or concerns, please don't hesitate to call.   Thank you for your referral.      Physician Signature:________________________________Date:__________________  By signing above, therapists plan is approved by physician

## 2018-09-26 NOTE — PROGRESS NOTES
Physical Therapy  Initial Assessment  Date: 2018  Patient Name: Pat Castro  MRN: [id number]  : 1940     Treatment Diagnosis: decreased neuromuscular control of the R ankle, decreased balance, and decreased R DF and great toe ROM    Restrictions   None    Subjective   General  Chart Reviewed: Yes  Referring Practitioner: Anyi Canada  Referral Date : 18  Diagnosis: R ankle Arthritis  PT Visit Information  Onset Date: 18  PT Insurance Information: Medicare-primary, [facility]-secondary  Subjective  Subjective: ONSET: R ankle pain 2-3 years, PT helped, once PT done pt stopped completing HEP and pain returned. Pt reports that he now feels stiff and painful with weightbearing. Pt reports that ascending/descending stairs is really painful. Pt reports that it is also painful when he walks on uneven surfaces. Pt reports that he was advised to purchase arch supports last PT bout and they have really helped. PLOF: no pain in the ankle. Pt reports that he did have chronic ankle sprains throughout his life. CLOF: weightbearing is painful, stiffness in the ankle, pt reports that he has pain with prolonged ambulation but he tries to keep moving.      Pain Screening  Patient Currently in Pain: Yes  Pain Assessment  Pain Assessment: 0-10  Pain Level: 9 (0 at rest, 8-9 when ascending/descending stairs, 7-8 when walking on grass. )  Pain Type: Chronic pain  Pain Location: Ankle  Pain Orientation: Right  Pain Descriptors: Aching  Pain Intervention(s): Medication (see eMar)  Vital Signs  Patient Currently in Pain: Yes    Vision/Hearing  Vision  Vision: Impaired (glasses 24/7)  Hearing  Hearing: Within functional limits    Orientation   WNL    Social/Functional History  Social/Functional History  Lives With: Spouse  Type of Home: House  Home Layout: One level  Home Access: Level entry  Bathroom Shower/Tub: Walk-in shower  Bathroom Equipment: Grab bars in shower  ADL Assistance: [address] Daily  Plan weeks: 6  Current Treatment Recommendations: Strengthening, ROM, Balance Training, Gait Training, Stair training, Manual Therapy - Joint Manipulation, Manual Therapy - Soft Tissue Mobilization, Neuromuscular Re-education, Pain Management, Positioning, Modalities, Home Exercise Program    G-Code  PT G-Codes  Functional Assessment Tool Used: PT assessment   Functional Limitation: Mobility: Walking and moving around  Mobility: Walking and Moving Around Current Status (): At least 40 percent but less than 60 percent impaired, limited or restricted  Mobility: Walking and Moving Around Goal Status (): At least 1 percent but less than 20 percent impaired, limited or restricted    OutComes Score   None                 Goals  Long term goals  Time Frame for Long term goals : 6 weeks  Long term goal 1: Pt will demo 15 degrees DF for improve ablitity to navigateunever terrain and stairs. Long term goal 2: Pt will improve balance as seen in the ability to perform SLB of 15 seconds on B LE. Long term goal 3: Pt will report decreased pain intensity of 2/10 at worst for improved activity tolerance.         Therapy Time   Individual Concurrent Group Co-treatment   Time In  1:00         Time Out  1:47         Minutes  [address], PT, DPT

## 2018-09-28 ENCOUNTER — HOSPITAL ENCOUNTER (OUTPATIENT)
Dept: PHYSICAL THERAPY | Age: 78
Setting detail: THERAPIES SERIES
Discharge: HOME | End: 2018-09-28
Payer: MEDICARE

## 2018-09-28 PROCEDURE — 97140 MANUAL THERAPY 1/> REGIONS: CPT

## 2018-09-28 PROCEDURE — 97110 THERAPEUTIC EXERCISES: CPT

## 2018-10-01 ENCOUNTER — HOSPITAL ENCOUNTER (OUTPATIENT)
Dept: PHYSICAL THERAPY | Age: 78
Setting detail: THERAPIES SERIES
Discharge: HOME | End: 2018-10-01
Payer: MEDICARE

## 2018-10-01 PROCEDURE — 97110 THERAPEUTIC EXERCISES: CPT

## 2018-10-01 PROCEDURE — 97140 MANUAL THERAPY 1/> REGIONS: CPT

## 2018-10-05 ENCOUNTER — HOSPITAL ENCOUNTER (OUTPATIENT)
Dept: PHYSICAL THERAPY | Age: 78
Setting detail: THERAPIES SERIES
Discharge: HOME | End: 2018-10-05
Payer: MEDICARE

## 2018-10-05 PROCEDURE — 97140 MANUAL THERAPY 1/> REGIONS: CPT

## 2018-10-05 PROCEDURE — 97110 THERAPEUTIC EXERCISES: CPT

## 2018-10-08 ENCOUNTER — HOSPITAL ENCOUNTER (OUTPATIENT)
Dept: PHYSICAL THERAPY | Age: 78
Setting detail: THERAPIES SERIES
Discharge: HOME | End: 2018-10-08
Payer: MEDICARE

## 2018-10-08 ENCOUNTER — APPOINTMENT (OUTPATIENT)
Dept: PHYSICAL THERAPY | Age: 78
End: 2018-10-08
Payer: MEDICARE

## 2018-10-08 PROCEDURE — 97140 MANUAL THERAPY 1/> REGIONS: CPT

## 2018-10-08 PROCEDURE — 97110 THERAPEUTIC EXERCISES: CPT

## 2018-10-12 ENCOUNTER — HOSPITAL ENCOUNTER (OUTPATIENT)
Dept: PHYSICAL THERAPY | Age: 78
Setting detail: THERAPIES SERIES
Discharge: HOME | End: 2018-10-12
Payer: MEDICARE

## 2018-10-12 PROCEDURE — 97110 THERAPEUTIC EXERCISES: CPT

## 2018-10-12 PROCEDURE — 97140 MANUAL THERAPY 1/> REGIONS: CPT

## 2018-10-16 ENCOUNTER — HOSPITAL ENCOUNTER (OUTPATIENT)
Dept: PHYSICAL THERAPY | Age: 78
Setting detail: THERAPIES SERIES
Discharge: HOME | End: 2018-10-16
Payer: MEDICARE

## 2018-10-16 PROCEDURE — 97110 THERAPEUTIC EXERCISES: CPT

## 2018-10-16 PROCEDURE — 97140 MANUAL THERAPY 1/> REGIONS: CPT

## 2018-10-16 NOTE — FLOWSHEET NOTE
Physical Therapy Daily Treatment Note  Date:  10/16/2018    Patient Name:  Rogelio Read   \"Hugh\" :  1940  MRN: [id number]  Restrictions/Precautions:    Medical/Treatment Diagnosis Information:   · Diagnosis: R ankle Arthritis  · Treatment Diagnosis: decreased neuromuscular control of the R ankle, decreased balance, and decreased R DF and great toe ROM    Tracking Information:  Physician Information Referring Practitioner: Barbara Anguiano     Plan of Care Sent Date: 18 Signed Received:    Visit Count / Total Visits      Insurance Approved Visits  medicare guidelines Approved Dates:     Insurance Information PT Insurance Information: Medicare-primary, [facility]-secondary    Progress Note/G-codes   []  Yes  [x]  No Next Due:      Pain level: -6/10     Subjective:  Pt reports he is doing ok today. The ankle always feels better after the hands on. Objective:   Observation: 10/5: R hallux valgus, plantar flexed 1st ray, significant swelling at lat and med malleoli, decreased PROM forefoot and calcaneal inversion  Test measurements:      Exercises:  Exercise/Equipment Resistance/Repetitions Other comments   Nustep  , 10/5: Achilles pain. IB/HR Gastroc 2 x 30\" x 10 HR  Soleus 2 x 30\" x 10 HR 10/5: Soleus stretching performed at floor this date   // bars Tandem 2 x 30\" B    Airex:  Semi tandem EO x 30 sec  Narrow MEIR EO x 30 sec  Normal MEIR EC 2 x 30 sec        2 in heel taps B x 8  6 in step, anterior translation of tibia over foot-DC due to painful audible popping            Rebounder                                                          Other Therapeutic Activities:  : Patient educated on the focus of skilled physical therapy services and plan of care, including: diagnosis, prognosis, treatment goals & options.     Home Exercise Program:  : The following exercises were performed and added to the pt's home program (educated on appropriate frequency, intensity and duration etc.): ankle 4 ways (focusing on neuromuscular control and stretching into end range). Manual Treatments:    10/16, 10/12, 10/5: R 1st ray PROM DF/eversion, PROM forefoot pronation with calcaneus stabilized in neutral, PROM calcaneal inversion, subtalar distraction, manual gastroc stretching, mobilization of Achilles  10/1: Hawk  IASTM to R-Achilles/distal gastroc and evertors and stretches to increase DF x 10 min  10/8, 9/28, 9/26: PROM R ankle all directions, splay mobs, distraction mobs, Great toe flexion/extension x 10 min     Modalities:  Consider trial MHP    Timed Code Treatment Minutes:  32    Total Treatment Minutes:  32    Treatment/Activity Tolerance:  [x] Patient tolerated treatment well [] Patient limited by fatigue  [] Patient limited by pain  [] Patient limited by other medical complications  [] Other:      Prognosis: [x] Good [] Fair  [] Poor    Patient Requires Follow-up: [x] Yes  [] No    Plan:   [x] Continue per plan of care [] Alter current plan (see comments)  [] Plan of care initiated [] Hold pending MD visit [] Discharge    Plan for Next Session:  See above.      Electronically signed by:  Jai Clark, PT, DPT

## 2018-10-19 ENCOUNTER — HOSPITAL ENCOUNTER (OUTPATIENT)
Dept: PHYSICAL THERAPY | Age: 78
Setting detail: THERAPIES SERIES
Discharge: HOME | End: 2018-10-19
Payer: MEDICARE

## 2018-10-19 PROCEDURE — 97140 MANUAL THERAPY 1/> REGIONS: CPT

## 2018-10-19 PROCEDURE — 97110 THERAPEUTIC EXERCISES: CPT

## 2018-10-19 NOTE — FLOWSHEET NOTE
Physical Therapy Daily Treatment Note  Date:  10/19/2018    Patient Name:  Gerson Ramírez   \"Hugh\" :  1940  MRN: [id number]  Restrictions/Precautions:    Medical/Treatment Diagnosis Information:   · Diagnosis: R ankle Arthritis  · Treatment Diagnosis: decreased neuromuscular control of the R ankle, decreased balance, and decreased R DF and great toe ROM    Tracking Information:  Physician Information Referring Practitioner: Sara Malik     Plan of Care Sent Date: 18 Signed Received:    Visit Count / Total Visits      Insurance Approved Visits  medicare guidelines Approved Dates:     Insurance Information PT Insurance Information: Medicare-primary, [facility]-secondary    Progress Note/G-codes   []  Yes  [x]  No Next Due:      Pain level: 6/10 R ankle    Subjective:  Pt reports he is doing ok today. The ankle always feels better after the hands on. Objective:   Observation: 10/5: R hallux valgus, plantar flexed 1st ray, significant swelling at lat and med malleoli, decreased PROM forefoot and calcaneal inversion  Test measurements:      Exercises:  Exercise/Equipment Resistance/Repetitions Other comments   Nustep  , 10/5: Achilles pain. IB/HR Gastroc 2 x 30\" x 10 HR  Soleus 2 x 30\" x 10 HR 10/5: Soleus stretching performed at floor this date   // bars Tandem 2 x 30\" B    Airex:  Semi tandem EO x 30 sec  Narrow MEIR EO x 30 sec  Normal MEIR EC 2 x 30 sec                    Rebounder                                                          Other Therapeutic Activities:  : Patient educated on the focus of skilled physical therapy services and plan of care, including: diagnosis, prognosis, treatment goals & options. Home Exercise Program:  : The following exercises were performed and added to the pt's home program (educated on appropriate frequency, intensity and duration etc.): ankle 4 ways (focusing on neuromuscular control and stretching into end range).      Manual Treatments:

## 2018-10-22 ENCOUNTER — HOSPITAL ENCOUNTER (OUTPATIENT)
Dept: PHYSICAL THERAPY | Age: 78
Setting detail: THERAPIES SERIES
Discharge: HOME | End: 2018-10-22
Payer: MEDICARE

## 2018-10-22 PROCEDURE — 97140 MANUAL THERAPY 1/> REGIONS: CPT

## 2018-10-22 PROCEDURE — 97110 THERAPEUTIC EXERCISES: CPT

## 2018-10-22 NOTE — FLOWSHEET NOTE
Physical Therapy Daily Treatment Note  Date:  10/22/2018    Patient Name:  Amado Washington   \"Hguh\" :  1940  MRN: [id number]  Restrictions/Precautions:    Medical/Treatment Diagnosis Information:   · Diagnosis: R ankle Arthritis  · Treatment Diagnosis: decreased neuromuscular control of the R ankle, decreased balance, and decreased R DF and great toe ROM    Tracking Information:  Physician Information Referring Practitioner: Meredith Garcia     Plan of Care Sent Date: 18 Signed Received:    Visit Count / Total Visits      Insurance Approved Visits  medicare guidelines Approved Dates:     Insurance Information PT Insurance Information: Medicare-primary, [facility]-secondary    Progress Note/G-codes   []  Yes  [x]  No Next Due:      Pain level: 6/10 R ankle    Subjective:  Pt reports he is doing ok today. The ankle always feels better after the hands on. Objective:   Observation: 10/5: R hallux valgus, plantar flexed 1st ray, significant swelling at lat and med malleoli, decreased PROM forefoot and calcaneal inversion  Test measurements:      Exercises:  Exercise/Equipment Resistance/Repetitions Other comments   Nustep  , 10/5: Achilles pain. IB/HR Gastroc 2 x 30\" x 10 HR  Soleus 2 x 30\" x 10 HR 10/5: Soleus stretching performed at floor this date   // bars Tandem 2 x 30\" B    Airex:  Semi tandem EO x 30 sec  Narrow MEIR EO x 30 sec  Normal MEIR EC 2 x 30 sec                    Rebounder                                                          Other Therapeutic Activities:  : Patient educated on the focus of skilled physical therapy services and plan of care, including: diagnosis, prognosis, treatment goals & options. Home Exercise Program:  : The following exercises were performed and added to the pt's home program (educated on appropriate frequency, intensity and duration etc.): ankle 4 ways (focusing on neuromuscular control and stretching into end range).      Manual Treatments: 10/19, 10/16, 10/12, 10/5: R 1st ray PROM DF/eversion, PROM forefoot pronation with calcaneus stabilized in neutral, PROM calcaneal inversion, subtalar distraction, manual gastroc stretching, mobilization of Achilles  10/1: Hawk  IASTM to R-Achilles/distal gastroc and evertors and stretches to increase DF x 10 min  10/22, 10/8, 9/28, 9/26: PROM R ankle all directions, splay mobs, distraction mobs, Great toe flexion/extension x 10 min     Modalities: 10/19: MHP applied to the L ankle at the beginning of the session    Timed Code Treatment Minutes:  30    Total Treatment Minutes:  30    Treatment/Activity Tolerance:  [x] Patient tolerated treatment well [] Patient limited by fatigue  [] Patient limited by pain  [] Patient limited by other medical complications  [] Other:      Prognosis: [x] Good [] Fair  [] Poor    Patient Requires Follow-up: [x] Yes  [] No    Plan:   [x] Continue per plan of care [] Alter current plan (see comments)  [] Plan of care initiated [] Hold pending MD visit [] Discharge    Plan for Next Session:  See above.      Electronically signed by:  Priyanka Sanches, PT, DPT

## 2018-10-25 ENCOUNTER — APPOINTMENT (OUTPATIENT)
Dept: PHYSICAL THERAPY | Age: 78
End: 2018-10-25
Payer: MEDICARE

## 2018-10-29 ENCOUNTER — HOSPITAL ENCOUNTER (OUTPATIENT)
Dept: PHYSICAL THERAPY | Age: 78
Setting detail: THERAPIES SERIES
Discharge: HOME | End: 2018-10-29
Payer: MEDICARE

## 2018-10-29 PROCEDURE — G8979 MOBILITY GOAL STATUS: HCPCS

## 2018-10-29 PROCEDURE — G8978 MOBILITY CURRENT STATUS: HCPCS

## 2018-10-29 PROCEDURE — 97530 THERAPEUTIC ACTIVITIES: CPT

## 2018-10-29 NOTE — PROGRESS NOTES
now feels stiff and painful with weightbearing. Pt reports that ascending/descending stairs is really painful. Pt reports that it is also painful when he walks on uneven surfaces. Pt reports that he was advised to purchase arch supports last PT bout and they have really helped. PLOF: no pain in the ankle. Pt reports that he did have chronic ankle sprains throughout his life. CLOF: weightbearing is painful, stiffness in the ankle, pt reports that he has pain with prolonged ambulation but he tries to keep moving. Pt reports that he feels that therapy is helping. Has had pain in the 5th metatarsal since last session however prior to that was doing well. Pt reports that he would like to continue therapy if possible as he feels it is helping. Objective:     AROM LLE (degrees)  L Ankle Dorsiflexion 0-20: 5 deg        Balance  Single Leg Stance R Leg: 3 (5 sec when standing on the side of the foot)     Assessment:  Conditions Requiring Skilled Therapeutic Intervention  Body structures, Functions, Activity limitations: Decreased functional mobility , Decreased ROM, Decreased balance, Decreased endurance  Assessment: Pt has been seen for 10 session during which time pt's ankle ROM has improved. Pt pain remains the same. Pt feels therapy is improving his symptoms. Pt would benefit from skilled physical therapy to address these impairments and allow pt to return to PLOF.    Treatment Diagnosis: decreased neuromuscular control of the R ankle, decreased balance, and decreased R DF and great toe ROM  Prognosis: Good    Plan:   Plan  Times per week: 2  Times per day: Daily  Plan weeks: 6  Current Treatment Recommendations: Strengthening, ROM, Balance Training, Gait Training, Stair training, Manual Therapy - Joint Manipulation, Manual Therapy - Soft Tissue Mobilization, Neuromuscular Re-education, Pain Management, Positioning, Modalities, Home Exercise Program          Progress towards goals:    Long term

## 2018-10-31 ENCOUNTER — HOSPITAL ENCOUNTER (OUTPATIENT)
Dept: PHYSICAL THERAPY | Age: 78
Setting detail: THERAPIES SERIES
Discharge: HOME | End: 2018-10-31
Payer: MEDICARE

## 2018-10-31 PROCEDURE — 97140 MANUAL THERAPY 1/> REGIONS: CPT

## 2018-10-31 PROCEDURE — 97110 THERAPEUTIC EXERCISES: CPT

## 2018-10-31 NOTE — FLOWSHEET NOTE
Physical Therapy Daily Treatment Note  Date:  10/31/2018    Patient Name:  Shelby Savage   \"Hugh\" :  1940  MRN: [id number]  Restrictions/Precautions:    Medical/Treatment Diagnosis Information:   · Diagnosis: R ankle Arthritis  · Treatment Diagnosis: decreased neuromuscular control of the R ankle, decreased balance, and decreased R DF and great toe ROM    Tracking Information:  Physician Information Referring Practitioner: Nasrin Roberts     Plan of Care Sent Date: 18 Signed Received:    Visit Count / Total Visits   + 08    Insurance Approved Visits  medicare guidelines Approved Dates:     Insurance Information PT Insurance Information: [facility]ell Hodges [facility]-secondary    Progress Note/G-codes   []  Yes  [x]  No Next Due:      Pain level: 6/10 R ankle    Subjective:  Pt reports that he got new shoes and the pain in the foot is decreasing. Objective:   Observation: 10/5: R hallux valgus, plantar flexed 1st ray, significant swelling at lat and med malleoli, decreased PROM forefoot and calcaneal inversion  Test measurements:      Exercises:  Exercise/Equipment Resistance/Repetitions Other comments   Nustep  , 10/5: Achilles pain. IB/HR Gastroc 2 x 30\" x 10 HR  Soleus 2 x 30\" x 10 HR 10/5: Soleus stretching performed at floor this date   // bars Airex:  Tandem 3 x 30\" B  Tandem with horizontal head turns 2 x 30\"    wobbleboard DLS A/P, M/L x 15, F14 sec hold        Cone taps 2 cones x 10 B         Rebounder                                                          Other Therapeutic Activities:    10/29: Goals assessed and future in therapy discussed. Assessed pt 5th metatarsal.  Advised pt to make MD appointment if pain has not dissipated in 1 week. Also discussed shoe options that have more room in the toe box.   : Patient educated on the focus of skilled physical therapy services and plan of care, including: diagnosis, prognosis, treatment goals & options.     Home Exercise

## 2018-11-02 ENCOUNTER — HOSPITAL ENCOUNTER (OUTPATIENT)
Dept: PHYSICAL THERAPY | Age: 78
Setting detail: THERAPIES SERIES
Discharge: HOME | End: 2018-11-02
Payer: MEDICARE

## 2018-11-02 PROCEDURE — 97112 NEUROMUSCULAR REEDUCATION: CPT

## 2018-11-02 PROCEDURE — 97140 MANUAL THERAPY 1/> REGIONS: CPT

## 2018-11-05 ENCOUNTER — HOSPITAL ENCOUNTER (OUTPATIENT)
Dept: PHYSICAL THERAPY | Age: 78
Setting detail: THERAPIES SERIES
Discharge: HOME | End: 2018-11-05
Payer: MEDICARE

## 2018-11-05 ENCOUNTER — APPOINTMENT (OUTPATIENT)
Dept: PHYSICAL THERAPY | Age: 78
End: 2018-11-05
Payer: MEDICARE

## 2018-11-05 PROCEDURE — 97110 THERAPEUTIC EXERCISES: CPT

## 2018-11-05 PROCEDURE — 97530 THERAPEUTIC ACTIVITIES: CPT

## 2018-11-07 ENCOUNTER — APPOINTMENT (OUTPATIENT)
Dept: PHYSICAL THERAPY | Age: 78
End: 2018-11-07
Payer: MEDICARE

## 2018-11-09 ENCOUNTER — HOSPITAL ENCOUNTER (OUTPATIENT)
Dept: PHYSICAL THERAPY | Age: 78
Setting detail: THERAPIES SERIES
Discharge: HOME | End: 2018-11-09
Payer: MEDICARE

## 2018-11-09 PROCEDURE — 97110 THERAPEUTIC EXERCISES: CPT

## 2018-11-09 PROCEDURE — 97112 NEUROMUSCULAR REEDUCATION: CPT

## 2018-11-09 NOTE — FLOWSHEET NOTE
Physical Therapy Daily Treatment Note  Date:  2018    Patient Name:  Deandre Howell   \"Hugh\" :  1940  MRN: [id number]  Restrictions/Precautions:    Medical/Treatment Diagnosis Information:   · Diagnosis: R ankle Arthritis  · Treatment Diagnosis: decreased neuromuscular control of the R ankle, decreased balance, and decreased R DF and great toe ROM    Tracking Information:  Physician Information Referring Practitioner: Daniel Acosta     Plan of Care Sent Date: 18 Signed Received:    Visit Count / Total Visits   + 2    Insurance Approved Visits  medicare guidelines Approved Dates:     Insurance Information PT Insurance Information: Medicare-primary, [facility]-secondary    Progress Note/G-codes   []  Yes  [x]  No Next Due:      Pain level: 6/10 R ankle    Subjective:  Pt reports he is doing good today, says he's been raking leaves and cutting grass walking on uneven surfaces w/ ankle pain 8/10 about 3/4 acre. Objective:   Observation: 10/5: R hallux valgus, plantar flexed 1st ray, significant swelling at lat and med malleoli, decreased PROM forefoot and calcaneal inversion  Test measurements:      Exercises:  Exercise/Equipment Resistance/Repetitions Other comments   Nustep Level 5 x 4 mins , 10/5: Achilles pain. IB/HR Gastroc 2 x 30\" x 10 HR  Soleus 2 x 30\" x 10 HR 10/5: Soleus stretching performed at floor this date   // bars Airex:  Tandem 3 x 30\" B  Tandem with horizontal head turns 2 x 30\"    wobbleboard DLS A/P, M/L x 15, E06 sec hold        Cone taps 2 cones x 10 B no UE support     NBOS Foam wedge w/ no UE support DF/PF x30 sec            soccerball A/P, M/L, CW/CCW B x 10 intermittent UE support    Fwd lunge on dynadisk x10 B    4 in step downs x10 B    Rebounder                                                          Other Therapeutic Activities:    10/29: Goals assessed and future in therapy discussed.  Assessed pt 5th metatarsal.  Advised pt to make MD appointment if pain

## 2018-11-12 ENCOUNTER — HOSPITAL ENCOUNTER (OUTPATIENT)
Dept: PHYSICAL THERAPY | Age: 78
Setting detail: THERAPIES SERIES
Discharge: HOME | End: 2018-11-12
Payer: MEDICARE

## 2018-11-12 ENCOUNTER — APPOINTMENT (OUTPATIENT)
Dept: PHYSICAL THERAPY | Age: 78
End: 2018-11-12
Payer: MEDICARE

## 2018-11-12 PROCEDURE — 97140 MANUAL THERAPY 1/> REGIONS: CPT

## 2018-11-12 PROCEDURE — 97112 NEUROMUSCULAR REEDUCATION: CPT

## 2018-11-12 PROCEDURE — 97110 THERAPEUTIC EXERCISES: CPT

## 2018-11-12 NOTE — FLOWSHEET NOTE
well [] Patient limited by fatigue  [] Patient limited by pain  [] Patient limited by other medical complications  [x] Other: Continue STM to calf. Pt tolerated increased there-ex well. Prognosis: [x] Good [] Fair  [] Poor    Patient Requires Follow-up: [x] Yes  [] No    Plan:   [x] Continue per plan of care [] Alter current plan (see comments)  [] Plan of care initiated [] Hold pending MD visit [] Discharge    Plan for Next Session:  See above. Electronically signed by:  Kaleb Ruiz, PT, SPT  Therapist was present, directed the patient's care, made skilled judgement, and was responsible for assessment and treatment of the patient.

## 2018-11-16 ENCOUNTER — HOSPITAL ENCOUNTER (OUTPATIENT)
Dept: PHYSICAL THERAPY | Age: 78
Setting detail: THERAPIES SERIES
Discharge: HOME | End: 2018-11-16
Payer: MEDICARE

## 2018-11-16 PROCEDURE — 97140 MANUAL THERAPY 1/> REGIONS: CPT

## 2018-11-16 PROCEDURE — 97530 THERAPEUTIC ACTIVITIES: CPT

## 2018-11-16 NOTE — FLOWSHEET NOTE
Physical Therapy Daily Treatment Note  Date:  2018    Patient Name:  Brenda Lyles   \"Hugh\" :  1940  MRN: [id number]  Restrictions/Precautions:    Medical/Treatment Diagnosis Information:   · Diagnosis: R ankle Arthritis  · Treatment Diagnosis: decreased neuromuscular control of the R ankle, decreased balance, and decreased R DF and great toe ROM    Tracking Information:  Physician Information Referring Practitioner: Francy Vargas     Plan of Care Sent Date: 18 Signed Received:    Visit Count / Total Visits   +     Insurance Approved Visits  medicare guidelines Approved Dates:     Insurance Information PT Insurance Information: [facility]-secondary    Progress Note/G-codes   []  Yes  [x]  No Next Due:      Pain level: -5/10 R ankle    Subjective:  Pt reports that the pain isn't too bad today, but he hasn't done much. Objective:   Observation:    antalgic gait with decreased weight bearing R LE, increased R toe out. Moderate swelling R ankle  10/5: R hallux valgus, plantar flexed 1st ray, significant swelling at lat and med malleoli, decreased PROM forefoot and calcaneal inversion  Test measurements:      Exercises:  Exercise/Equipment Resistance/Repetitions Other comments   Nustep Level 3 x 4 mins, s=10 , 10/5: Achilles pain.     IB/HR Gastroc 2 x 30\" x 10 HR  Soleus 2 x 30\" x 10 HR 10/5: Soleus stretching performed at floor this date   // bars Airex:  Tandem 2 x 60\" B  Tandem with horizontal head turns 2 x 30\"    Wobbleboard: A/P, M/L: wt shifts  x 15ea ,    DLS I40 sec hold ea        Cone taps 2 cones x 15 B no UE support     NBOS Foam wedge w/ no UE support DF/PF x60 sec            soccerball A/P, M/L, CW/CCW B x 10 intermittent UE support    Fwd lunge on dynadisk x10 B      2\" step downs B: 10x ea     Rebounder                                                          Other Therapeutic Activities:   advised pt to try wearing his 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 Assessed pt 5th metatarsal.  Advised pt to make MD appointment if pain has not dissipated in 1 week. Also discussed shoe options that have more room in the toe box.   9/26: Patient educated on the focus of skilled physical therapy services and plan of care, including: diagnosis, prognosis, treatment goals & options. Home Exercise Program:  9/26: The following exercises were performed and added to the pt's home program (educated on appropriate frequency, intensity and duration etc.): ankle 4 ways (focusing on neuromuscular control and stretching into end range). Manual Treatments:    11/16, 11/12 in prone:  therastick to R calf,  Manual stretching into DF   11/09, 11/5: therastick to R calf x 5 min  11/02, 10/31: PROM R ankle all directions, splay mobs, distraction mobs, Great toe flexion/extension x 10 min , therastick to the calf x 10 min  10/19, 10/16, 10/12, 10/5: R 1st ray PROM DF/eversion, PROM forefoot pronation with calcaneus stabilized in neutral, PROM calcaneal inversion, subtalar distraction, manual gastroc stretching, mobilization of Achilles  10/1: Hawk  IASTM to R-Achilles/distal gastroc and evertors and stretches to increase DF x 10 min  10/22, 10/8, 9/28, 9/26: PROM R ankle all directions, splay mobs, distraction mobs, Great toe flexion/extension x 10 min     Modalities: 10/19: MHP applied to the L ankle at the beginning of the session    Timed Code Treatment Minutes:  30    Total Treatment Minutes:  30    Treatment/Activity Tolerance:  [x] Patient tolerated treatment well [] Patient limited by fatigue  [] Patient limited by pain  [] Patient limited by other medical complications  [x] Other: Continue STM to calf.

## 2018-11-19 ENCOUNTER — HOSPITAL ENCOUNTER (OUTPATIENT)
Dept: PHYSICAL THERAPY | Age: 78
Setting detail: THERAPIES SERIES
Discharge: HOME | End: 2018-11-19
Payer: MEDICARE

## 2018-11-19 PROCEDURE — 97110 THERAPEUTIC EXERCISES: CPT

## 2018-11-19 PROCEDURE — 97140 MANUAL THERAPY 1/> REGIONS: CPT

## 2018-11-19 NOTE — FLOWSHEET NOTE
Physical Therapy Daily Treatment Note  Date:  2018    Patient Name:  Nick Acosta   \"Hugh\" :  1940  MRN: [id number]  Restrictions/Precautions:    Medical/Treatment Diagnosis Information:   · Diagnosis: R ankle Arthritis  · Treatment Diagnosis: decreased neuromuscular control of the R ankle, decreased balance, and decreased R DF and great toe ROM    Tracking Information:  Physician Information Referring Practitioner: Solitario Snow     Plan of Care Sent Date: 18 Signed Received:    Visit Count / Total Visits   +     Insurance Approved Visits  medicare guidelines Approved Dates:     Insurance Information PT Insurance Information: [employer] Shows, [facility]-secondary    Progress Note/G-codes   []  Yes  [x]  No Next Due:      Pain level: -5/10 R ankle    Subjective:  Pt reports that the pain isn't too bad today, but he hasn't done much. Objective:   Observation:    antalgic gait with decreased weight bearing R LE, increased R toe out. Moderate swelling R ankle  10/5: R hallux valgus, plantar flexed 1st ray, significant swelling at lat and med malleoli, decreased PROM forefoot and calcaneal inversion  Test measurements:      Exercises:  Exercise/Equipment Resistance/Repetitions Other comments   Nustep  , 10/5: Achilles pain.     IB/HR Gastroc 2 x 30\" x 10 HR  Soleus 2 x 30\" x 10 HR 10/5: Soleus stretching performed at floor this date   // bars Airex:  Tandem 2 x 60\" B  Tandem with horizontal head turns 2 x 30\"    Wobbleboard: A/P, M/L: wt shifts  x 15ea ,    DLS O44 sec hold ea        Cone taps 2 cones x 15 B no UE support     NBOS Foam wedge w/ no UE support DF/PF x60 sec            soccerball A/P, M/L, CW/CCW B x 10 intermittent UE support    Fwd lunge on dynadisk x10 B      2\" step downs B: 10x ea     Rebounder                                                          Other Therapeutic Activities:   advised pt to try wearing his compression socks (tht he wears for air

## 2018-11-21 ENCOUNTER — HOSPITAL ENCOUNTER (OUTPATIENT)
Dept: PHYSICAL THERAPY | Age: 78
Setting detail: THERAPIES SERIES
Discharge: HOME | End: 2018-11-21
Payer: MEDICARE

## 2018-11-21 PROCEDURE — 97110 THERAPEUTIC EXERCISES: CPT

## 2018-11-21 PROCEDURE — 97140 MANUAL THERAPY 1/> REGIONS: CPT

## 2018-11-21 NOTE — FLOWSHEET NOTE
session    Prognosis: [x] Good [] Fair  [] Poor    Patient Requires Follow-up: [x] Yes  [] No    Plan:   [x] Continue per plan of care [] Alter current plan (see comments)  [] Plan of care initiated [] Hold pending MD visit [] Discharge    Plan for Next Session:  See above.      Electronically signed by:  Haley Huitron PT, DPT

## 2018-11-26 ENCOUNTER — HOSPITAL ENCOUNTER (OUTPATIENT)
Dept: PHYSICAL THERAPY | Age: 78
Setting detail: THERAPIES SERIES
Discharge: HOME | End: 2018-11-26
Payer: MEDICARE

## 2018-11-26 PROCEDURE — 97110 THERAPEUTIC EXERCISES: CPT

## 2018-11-26 PROCEDURE — 97140 MANUAL THERAPY 1/> REGIONS: CPT

## 2018-11-26 PROCEDURE — 97112 NEUROMUSCULAR REEDUCATION: CPT

## 2018-11-26 NOTE — FLOWSHEET NOTE
NBOS Foam wedge w/ no UE support DF/PF x60 sec            soccerball A/P, M/L, CW/CCW B x 10 intermittent UE support    Fwd lunge on dynadisk x10 B      2\" step downs B: 10x ea     Rebounder                                                          Other Therapeutic Activities: 11/26 gave pt handouts re 8-15 and 15-20mmhg compression knee socks he can try on  to address chronic swelling R ankle. Encouraged pt to go to [employer] (where he likes to ex with friends) to see what balance type equipment that they have. Encourged him to request equipment including IB, dynadisc, airex foam pad, wedge, wobble board if they don ot already have them in the gym. 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 Assessed pt 5th metatarsal.  Advised pt to make MD appointment if pain has not dissipated in 1 week. Also discussed shoe options that have more room in the toe box.   9/26: Patient educated on the focus of skilled physical therapy services and plan of care, including: diagnosis, prognosis, treatment goals & options. Home Exercise Program:  9/26: The following exercises were performed and added to the pt's home program (educated on appropriate frequency, intensity and duration etc.): ankle 4 ways (focusing on neuromuscular control and stretching into end range).      Manual Treatments:    11/26, 11/21, 11/19, 11/16, 11/12 in prone:  therastick to R calf,  Manual stretching into DF   11/09, 11/5: therastick to R calf x 5 min  11/02, 10/31: PROM R ankle all directions, splay mobs, distraction mobs, Great toe flexion/extension x 10 min , therastick to the calf x 10 min  10/19, 10/16, 10/12, 10/5: R 1st ray PROM DF/eversion, PROM forefoot pronation with calcaneus stabilized in neutral, PROM calcaneal inversion, subtalar distraction, manual gastroc stretching, mobilization of Achilles  10/1: Hawk  IASTM to R-Achilles/distal gastroc and evertors and stretches to increase DF x 10 min  10/22, 10/8, 9/28, 9/26: PROM R ankle all directions, splay mobs, distraction mobs, Great toe flexion/extension x 10 min     Modalities: 10/19: MHP applied to the R ankle at the beginning of the session    Timed Code Treatment Minutes:  40    Total Treatment Minutes:  40    Treatment/Activity Tolerance:  [x] Patient tolerated treatment well [] Patient limited by fatigue  [] Patient limited by pain  [] Patient limited by other medical complications  [] Other:    Prognosis: [x] Good [] Fair  [] Poor    Patient Requires Follow-up: [x] Yes  [] No    Plan:   [x] Continue per plan of care [] Alter current plan (see comments)  [] Plan of care initiated [] Hold pending MD visit [] Discharge    Plan for Next Session:  See above.  Progress HEP as needed to include balance ex at gym in preparation for dc; re-assess next visit    Electronically signed by:  Gerson Sharp PT, DPT

## 2018-11-30 ENCOUNTER — HOSPITAL ENCOUNTER (OUTPATIENT)
Dept: PHYSICAL THERAPY | Age: 78
Setting detail: THERAPIES SERIES
Discharge: HOME | End: 2018-11-30
Payer: MEDICARE

## 2018-11-30 PROCEDURE — 97530 THERAPEUTIC ACTIVITIES: CPT

## 2018-11-30 PROCEDURE — G8979 MOBILITY GOAL STATUS: HCPCS

## 2018-11-30 PROCEDURE — G8978 MOBILITY CURRENT STATUS: HCPCS

## 2018-11-30 PROCEDURE — G8980 MOBILITY D/C STATUS: HCPCS

## 2018-11-30 ASSESSMENT — PAIN DESCRIPTION - ORIENTATION: ORIENTATION: RIGHT

## 2018-11-30 ASSESSMENT — PAIN DESCRIPTION - LOCATION: LOCATION: ANKLE

## 2018-11-30 ASSESSMENT — PAIN DESCRIPTION - DESCRIPTORS: DESCRIPTORS: ACHING

## 2018-11-30 ASSESSMENT — PAIN SCALES - GENERAL: PAINLEVEL_OUTOF10: 7

## 2018-11-30 NOTE — DISCHARGE SUMMARY
Pt reports that his pain levels are about the same, has slightly higher functional mobiliyt but overall has not seen a significant improvement. Pt reports that his previous bouts of physical therapy helped more, not sure if he will be able to progress more, feels that he has plateued. Pain Assessment  Pain Assessment: 0-10  Pain Level: 7  Pain Location: Ankle  Pain Orientation: Right  Pain Descriptors: Aching      Objective:        AROM LLE (degrees)  L Ankle Dorsiflexion 0-20: 5 deg        Balance  Single Leg Stance R Leg: 3  Single Leg Stance L Leg:  (34)         Assessment:  Conditions Requiring Skilled Therapeutic Intervention  Body structures, Functions, Activity limitations: Decreased functional mobility , Decreased ROM, Decreased balance, Decreased endurance  Assessment: Pt has been seen for 20 sessions during which time functional mobility has slightly improved and balance have improved on the non affected side however pt has not seen a significant improvement with therapy. Pt is now d[facility] to [facility].    Treatment Diagnosis: decreased neuromuscular control of the R ankle, decreased balance, and decreased R DF and great toe ROM    Plan:   Plan  Times per week: 2  Times per day: Daily  Plan weeks: 6  Current Treatment Recommendations: Strengthening, ROM, Balance Training, Gait Training, Stair training, Manual Therapy - Joint Manipulation, Manual Therapy - Soft Tissue Mobilization, Neuromuscular Re-education, Pain Management, Positioning, Modalities, Home Exercise Program          Progress towards goals:    Long term goals  Long term goal 1: Pt will demo 15 degrees DF for improve ablitity to navigateunever terrain and stairs. - Progressing  Long term goal 2: Pt will improve balance as seen in the ability to perform SLB of 15 seconds on B LE. - NOT MET  Long term goal 3: Pt will report decreased pain intensity of 2/10 at worst for improved activity tolerance. - NOT MET    Current Frequency/Duration:  # Days per week: [] 1 day # Weeks: [] 1 week [] 4 weeks      [x] 2 days? [] 2 weeks [] 5 weeks      [] 3 days   [] 3 weeks [x] 6 weeks     Rehab Potential: [] Excellent [x] Good [] Fair  [] Poor     Goal Status:  [] Achieved [] Partially Achieved  [x] Not Achieved (progressing)     Patient Status: [] Continue per initial plan of Care     [x] Patient now discharged     [] Additional visits requested, Please re-certify for additional visits:      Requested frequency/duration:  X/week for weeks    Electronically signed by:  Boby Alarcon, PT, DPT    If you have any questions or concerns, please don't hesitate to call.   Thank you for your referral.    Physician Signature:________________________________Date:__________________  By signing above, therapists plan is approved by physician

## 2019-03-08 ENCOUNTER — OFFICE VISIT (OUTPATIENT)
Dept: FAMILY MEDICINE CLINIC | Age: 79
End: 2019-03-08
Payer: MEDICARE

## 2019-03-08 VITALS
BODY MASS INDEX: 30.92 KG/M2 | WEIGHT: 197 LBS | HEIGHT: 67 IN | DIASTOLIC BLOOD PRESSURE: 64 MMHG | SYSTOLIC BLOOD PRESSURE: 102 MMHG | HEART RATE: 67 BPM | OXYGEN SATURATION: 99 %

## 2019-03-08 DIAGNOSIS — M25.571 CHRONIC PAIN OF RIGHT ANKLE: ICD-10-CM

## 2019-03-08 DIAGNOSIS — M25.572 ACUTE LEFT ANKLE PAIN: ICD-10-CM

## 2019-03-08 DIAGNOSIS — R73.03 PREDIABETES: ICD-10-CM

## 2019-03-08 DIAGNOSIS — E78.00 HYPERCHOLESTEREMIA: ICD-10-CM

## 2019-03-08 DIAGNOSIS — I10 ESSENTIAL HYPERTENSION: Primary | ICD-10-CM

## 2019-03-08 DIAGNOSIS — M19.071 ARTHRITIS OF RIGHT ANKLE: ICD-10-CM

## 2019-03-08 DIAGNOSIS — M76.61 ACHILLES TENDINITIS OF RIGHT LOWER EXTREMITY: ICD-10-CM

## 2019-03-08 DIAGNOSIS — G89.29 CHRONIC PAIN OF RIGHT ANKLE: ICD-10-CM

## 2019-03-08 DIAGNOSIS — E55.9 VITAMIN D DEFICIENCY: ICD-10-CM

## 2019-03-08 DIAGNOSIS — M25.471 RIGHT ANKLE SWELLING: ICD-10-CM

## 2019-03-08 LAB
A/G RATIO: 1.8 (ref 1.1–2.2)
ALBUMIN SERPL-MCNC: 4.2 G/DL (ref 3.4–5)
ALP BLD-CCNC: 76 U/L (ref 40–129)
ALT SERPL-CCNC: 15 U/L (ref 10–40)
ANION GAP SERPL CALCULATED.3IONS-SCNC: 15 MMOL/L (ref 3–16)
AST SERPL-CCNC: 17 U/L (ref 15–37)
BILIRUB SERPL-MCNC: 1 MG/DL (ref 0–1)
BUN BLDV-MCNC: 24 MG/DL (ref 7–20)
CALCIUM SERPL-MCNC: 9.7 MG/DL (ref 8.3–10.6)
CHLORIDE BLD-SCNC: 99 MMOL/L (ref 99–110)
CHOLESTEROL, TOTAL: 147 MG/DL (ref 0–199)
CO2: 26 MMOL/L (ref 21–32)
CREAT SERPL-MCNC: 0.9 MG/DL (ref 0.8–1.3)
GFR AFRICAN AMERICAN: >60
GFR NON-AFRICAN AMERICAN: >60
GLOBULIN: 2.4 G/DL
GLUCOSE BLD-MCNC: 119 MG/DL (ref 70–99)
HDLC SERPL-MCNC: 62 MG/DL (ref 40–60)
LDL CHOLESTEROL CALCULATED: 70 MG/DL
POTASSIUM SERPL-SCNC: 4.6 MMOL/L (ref 3.5–5.1)
SODIUM BLD-SCNC: 140 MMOL/L (ref 136–145)
TOTAL PROTEIN: 6.6 G/DL (ref 6.4–8.2)
TRIGL SERPL-MCNC: 75 MG/DL (ref 0–150)
VITAMIN D 25-HYDROXY: 37.8 NG/ML
VLDLC SERPL CALC-MCNC: 15 MG/DL

## 2019-03-08 PROCEDURE — 4040F PNEUMOC VAC/ADMIN/RCVD: CPT | Performed by: NURSE PRACTITIONER

## 2019-03-08 PROCEDURE — G8417 CALC BMI ABV UP PARAM F/U: HCPCS | Performed by: NURSE PRACTITIONER

## 2019-03-08 PROCEDURE — 1036F TOBACCO NON-USER: CPT | Performed by: NURSE PRACTITIONER

## 2019-03-08 PROCEDURE — 1123F ACP DISCUSS/DSCN MKR DOCD: CPT | Performed by: NURSE PRACTITIONER

## 2019-03-08 PROCEDURE — 36415 COLL VENOUS BLD VENIPUNCTURE: CPT | Performed by: NURSE PRACTITIONER

## 2019-03-08 PROCEDURE — G8427 DOCREV CUR MEDS BY ELIG CLIN: HCPCS | Performed by: NURSE PRACTITIONER

## 2019-03-08 PROCEDURE — 1101F PT FALLS ASSESS-DOCD LE1/YR: CPT | Performed by: NURSE PRACTITIONER

## 2019-03-08 PROCEDURE — G8482 FLU IMMUNIZE ORDER/ADMIN: HCPCS | Performed by: NURSE PRACTITIONER

## 2019-03-08 PROCEDURE — 99214 OFFICE O/P EST MOD 30 MIN: CPT | Performed by: NURSE PRACTITIONER

## 2019-03-08 RX ORDER — CELECOXIB 200 MG/1
200 CAPSULE ORAL DAILY
Qty: 90 CAPSULE | Refills: 3 | Status: SHIPPED | OUTPATIENT
Start: 2019-03-08 | End: 2019-09-11 | Stop reason: SDUPTHER

## 2019-03-08 RX ORDER — SIMVASTATIN 20 MG
TABLET ORAL
Qty: 90 TABLET | Refills: 3 | Status: SHIPPED | OUTPATIENT
Start: 2019-03-08 | End: 2020-03-04 | Stop reason: SDUPTHER

## 2019-03-08 RX ORDER — ENALAPRIL MALEATE 20 MG/1
TABLET ORAL
Qty: 180 TABLET | Refills: 3 | Status: SHIPPED | OUTPATIENT
Start: 2019-03-08 | End: 2019-09-11 | Stop reason: SDUPTHER

## 2019-03-08 RX ORDER — LABETALOL 200 MG/1
TABLET, FILM COATED ORAL
Qty: 270 TABLET | Refills: 3 | Status: SHIPPED | OUTPATIENT
Start: 2019-03-08 | End: 2019-09-11 | Stop reason: SDUPTHER

## 2019-03-08 RX ORDER — HYDROCHLOROTHIAZIDE 25 MG/1
TABLET ORAL
Qty: 90 TABLET | Refills: 3 | Status: SHIPPED | OUTPATIENT
Start: 2019-03-08 | End: 2019-09-11 | Stop reason: SDUPTHER

## 2019-03-08 ASSESSMENT — PATIENT HEALTH QUESTIONNAIRE - PHQ9
2. FEELING DOWN, DEPRESSED OR HOPELESS: 0
SUM OF ALL RESPONSES TO PHQ QUESTIONS 1-9: 0
SUM OF ALL RESPONSES TO PHQ9 QUESTIONS 1 & 2: 0
SUM OF ALL RESPONSES TO PHQ QUESTIONS 1-9: 0
1. LITTLE INTEREST OR PLEASURE IN DOING THINGS: 0

## 2019-03-09 LAB
ESTIMATED AVERAGE GLUCOSE: 116.9 MG/DL
HBA1C MFR BLD: 5.7 %

## 2019-03-21 PROBLEM — R73.03 PREDIABETES: Status: ACTIVE | Noted: 2019-03-21

## 2019-07-16 ENCOUNTER — OFFICE VISIT (OUTPATIENT)
Dept: FAMILY MEDICINE CLINIC | Age: 79
End: 2019-07-16
Payer: MEDICARE

## 2019-07-16 VITALS
TEMPERATURE: 98.2 F | WEIGHT: 203 LBS | DIASTOLIC BLOOD PRESSURE: 68 MMHG | OXYGEN SATURATION: 98 % | SYSTOLIC BLOOD PRESSURE: 118 MMHG | HEART RATE: 61 BPM | BODY MASS INDEX: 31.86 KG/M2 | HEIGHT: 67 IN

## 2019-07-16 DIAGNOSIS — H61.23 IMPACTED CERUMEN, BILATERAL: Primary | ICD-10-CM

## 2019-07-16 DIAGNOSIS — T63.441A BEE STING, ACCIDENTAL OR UNINTENTIONAL, INITIAL ENCOUNTER: ICD-10-CM

## 2019-07-16 PROCEDURE — G8427 DOCREV CUR MEDS BY ELIG CLIN: HCPCS | Performed by: REGISTERED NURSE

## 2019-07-16 PROCEDURE — 1123F ACP DISCUSS/DSCN MKR DOCD: CPT | Performed by: REGISTERED NURSE

## 2019-07-16 PROCEDURE — G8417 CALC BMI ABV UP PARAM F/U: HCPCS | Performed by: REGISTERED NURSE

## 2019-07-16 PROCEDURE — 4040F PNEUMOC VAC/ADMIN/RCVD: CPT | Performed by: REGISTERED NURSE

## 2019-07-16 PROCEDURE — 99213 OFFICE O/P EST LOW 20 MIN: CPT | Performed by: REGISTERED NURSE

## 2019-07-16 PROCEDURE — 69210 REMOVE IMPACTED EAR WAX UNI: CPT | Performed by: REGISTERED NURSE

## 2019-07-16 PROCEDURE — 1036F TOBACCO NON-USER: CPT | Performed by: REGISTERED NURSE

## 2019-07-16 NOTE — PATIENT INSTRUCTIONS
relieve pain and decrease the reaction. · Take an over-the-counter antihistamine, such as diphenhydramine (Benadryl) or loratadine (Claritin), to relieve swelling, redness, and itching. Calamine lotion or hydrocortisone cream may also help. Do not give antihistamines to your child unless you have checked with the doctor first.  · Be safe with medicines. If your doctor prescribed medicine for your allergy, take it exactly as prescribed. Call your doctor if you think you are having a problem with your medicine. You will get more details on the specific medicines your doctor prescribes. · Your doctor may prescribe a shot of epinephrine to carry with you in case you have a severe reaction. Learn how and when to give yourself the shot, and keep it with you at all times. Make sure it has not . · Go to the emergency room anytime you have a severe reaction. Go even if you have given yourself epinephrine and are feeling better. Symptoms can come back. When should you call for help? Call 911 anytime you think you may need emergency care. For example, call if:    · You have symptoms of a severe allergic reaction. These may include:  ? Sudden raised, red areas (hives) all over your body. ? Swelling of the throat, mouth, lips, or tongue. ? Trouble breathing. ? Passing out (losing consciousness). Or you may feel very lightheaded or suddenly feel weak, confused, or restless.    Call your doctor now or seek immediate medical care if:    · You have symptoms of an allergic reaction not right at the sting or bite, such as:  ? A rash or small area of hives (raised, red areas on the skin). ? Itching. ? Swelling. ? Belly pain, nausea, or vomiting.     · You have a lot of swelling around the site (such as your entire arm or leg is swollen).     · You have signs of infection, such as:  ? Increased pain, swelling, redness, or warmth around the sting. ? Red streaks leading from the area.   ? Pus draining from the

## 2019-08-21 ENCOUNTER — OFFICE VISIT (OUTPATIENT)
Dept: ENT CLINIC | Age: 79
End: 2019-08-21
Payer: MEDICARE

## 2019-08-21 VITALS
DIASTOLIC BLOOD PRESSURE: 67 MMHG | HEART RATE: 64 BPM | TEMPERATURE: 97.2 F | HEIGHT: 67 IN | WEIGHT: 203 LBS | BODY MASS INDEX: 31.86 KG/M2 | SYSTOLIC BLOOD PRESSURE: 142 MMHG

## 2019-08-21 DIAGNOSIS — H91.90 PERCEIVED HEARING CHANGES: ICD-10-CM

## 2019-08-21 DIAGNOSIS — H61.23 BILATERAL IMPACTED CERUMEN: Primary | ICD-10-CM

## 2019-08-21 PROCEDURE — 99203 OFFICE O/P NEW LOW 30 MIN: CPT | Performed by: OTOLARYNGOLOGY

## 2019-08-21 PROCEDURE — 1123F ACP DISCUSS/DSCN MKR DOCD: CPT | Performed by: OTOLARYNGOLOGY

## 2019-08-21 PROCEDURE — G8427 DOCREV CUR MEDS BY ELIG CLIN: HCPCS | Performed by: OTOLARYNGOLOGY

## 2019-08-21 PROCEDURE — 4040F PNEUMOC VAC/ADMIN/RCVD: CPT | Performed by: OTOLARYNGOLOGY

## 2019-08-21 PROCEDURE — 69210 REMOVE IMPACTED EAR WAX UNI: CPT | Performed by: OTOLARYNGOLOGY

## 2019-08-21 PROCEDURE — 1036F TOBACCO NON-USER: CPT | Performed by: OTOLARYNGOLOGY

## 2019-08-21 PROCEDURE — G8417 CALC BMI ABV UP PARAM F/U: HCPCS | Performed by: OTOLARYNGOLOGY

## 2019-08-21 ASSESSMENT — ENCOUNTER SYMPTOMS
CONSTIPATION: 0
SINUS PAIN: 0
PHOTOPHOBIA: 0
BLOOD IN STOOL: 0
VOMITING: 0
BACK PAIN: 0
RHINORRHEA: 0
SINUS PRESSURE: 0
EYE ITCHING: 0
COLOR CHANGE: 0
DIARRHEA: 0
EYE DISCHARGE: 0
COUGH: 0
FACIAL SWELLING: 0
NAUSEA: 0
TROUBLE SWALLOWING: 0
SORE THROAT: 0
SHORTNESS OF BREATH: 0
CHOKING: 0
VOICE CHANGE: 0
WHEEZING: 0
STRIDOR: 0

## 2019-08-21 NOTE — PROGRESS NOTES
[facility]  [address] [facility], [address]  P: [phone]  F: [phone]       Patient     Mineola Seat  [birth year]    ChiefComplaint     Chief Complaint   Patient presents with    Cerumen Impaction     Patient needs his ears cleaned       History of Present Illness     Uma Martinez is a pleasant 72-year-old male who presents as new patient today for hearing loss and possible cerumen impaction. He states his wife thinks he is having some hearing loss. He went to his primary care recently and was told he had cerumen impaction bilaterally. They were unable to clean it. He was referred here. He denies any significant tinnitus. Denies significant noise exposure in the past.  Denies a history of ear infections or ear surgeries. Denies dizzy sensation or otalgia or otorrhea.     Past Medical History     Past Medical History:   Diagnosis Date    Aortic regurgitation 2009    Environmental allergies     Hypercholesterolemia     Hypertension     Impaired fasting glucose     Prostate cancer ([facility] [address] Albaro Baker       Past Surgical History     Past Surgical History:   Procedure Laterality Date    COLONOSCOPY  01/2017    Repeat 5 years    OTHER SURGICAL HISTORY  2008    RADIOACTIVE SEED IMPLANTS FOR PROSTATE CANCER    PROSTATE SURGERY  2007    seed implant Haritha Hudson)       Family History     Family History   Problem Relation Age of Onset    Cancer Mother         breast    Heart Disease Father 77        MI    Diabetes Paternal Grandmother        Social History     Social History     Socioeconomic History    Marital status:      Spouse name: Not on file    Number of children: Not on file    Years of education: Not on file    Highest education level: Not on file   Occupational History    Not on file   Social Needs    Financial resource strain: Not on file    Food insecurity:     Worry: Not on file     Inability: Not on file    Transportation needs:

## 2019-08-28 ENCOUNTER — PROCEDURE VISIT (OUTPATIENT)
Dept: AUDIOLOGY | Age: 79
End: 2019-08-28
Payer: MEDICARE

## 2019-08-28 DIAGNOSIS — H90.3 SENSORINEURAL HEARING LOSS, BILATERAL: Primary | ICD-10-CM

## 2019-08-28 PROCEDURE — 92557 COMPREHENSIVE HEARING TEST: CPT | Performed by: AUDIOLOGIST

## 2019-08-28 NOTE — PATIENT INSTRUCTIONS
Good Communication Strategies    Communication can be challenging for anyone, but can be especially difficult for those with some degree of hearing loss. While we may not be able to control every factor that may lead to difficulty with communication, there are Good Communication Strategies that we can all use in our day-to-day lives. Communication takes both parties working together for it to be successful. Tips as a Listener:   1. Control your environment. It is important to limit the amount of background noise in the room when possible. You should also consider having a good light source in the room to best see the other person. 2. Ask for clarification. Instead of saying \"What?\", you can use parts of what you heard to make a new question. For example, if you heard the word \"Thursday\" but not the rest of the week, you may ask \"What was that about Thursday? \" or \"What did you want to do Thursday? \". This shows the person talking that you are listening and will help them better explain what they are saying. 3. Be an advocate for yourself. If you are hearing but not understanding, tell the other person \"I can hear you, but I need you to slow down when you speak. \"  Or if someone is facing the other direction, say \"I cannot hear you when you are not looking at me when we talk. \"       Tips as a Talker:   - Sit or stand 3 to 6 feet away to maximize audibility         -- It is unrealistic to believe someone else will fully hear your message if you are speaking from across the room or in a different room in the house   - Stay at eye level to help with visual cues   - Make sure you have the persons attention before speaking   - Use facial expressions and gestures to accentuate your message   - Raise your voice slightly (do not scream)   - Speak slowly and distinctly   - Use short, simple sentences   - Rephrase your words if the person is having a hard time understanding you    - To avoid distortion, dont speak you can do to prevent it      Exposure to loud sounds, in an occupational setting or recreational, can cause permanent hearing loss. Sound is measured in decibels (dB). Noise-induced hearing loss is the ONLY type of preventable hearing loss. Hearing loss related to noise exposure can occur at any age. There are small sensory cells, called inner and outer hair cells, within the inner ear (cochlea). These cells process the loudness (intensity) and pitch (frequency) of sound and send the signal to the brain via our auditory nerve (vestibulocochlear nerve, cranial nerve VIII). When these cells are damaged, they can result in permanent hearing loss and/or tinnitus. The hair cells responsible for high frequency sounds, like birds chirping, are most likely to be damaged due to loud sounds. The high frequency sounds are also very important for our clarity and understanding of speech. OCCUPATIONAL NOISE EXPOSURE RECREATIONAL NOISE EXPOSURE   Some jobs may have exposure to loud sounds in the workplace. These jobs may include but are not limited to:  [employer]   Construction   Welding   Landscaping   Hairdressing/hairstyling   Musicians  [employer]    ... And more! Many activities outside of work may cause permanent hearing loss. These activities may include but are not limited to:  Lawnmowers, leaf blowers  [employer] (such as pigs squealing)   Chainsaws and other power tools  [employer] musical instruments and/or singing   Listening to music too loudly - at concerts, through stereo, through ear buds or headphones   Attending sporting events   Attending fireworks shows or using fireworks at home  [employer] of firearms   . .. And more! REDUCE OR PROTECT YOUR EARS FROM NOISE EXPOSURE    To do your best to avoid noise-induced hearing loss, here are some tips:   Limit exposure to loud sounds. 85 dB (decibels) is safe for 8 hours.   As sounds are louder, the length of time the sound is safe lessens. These numbers are cumulative across a 24-hour period. (NIOSH and CDC, 2002)  o 85 dB is safe for 8 hours  o 88 dB is safe for 4 hours  o 91 dB is safe for 2 hours  o 94 dB is safe for 1 hour  o 97 dB is safe for 30 minutes  o 100 dB is safe for 15 minutes  o 103 dB is safe for 7.5 minutes  o 106 dB is safe for 3.75 minutes  o 109 dB is safe for LESS THAN 2 minutes  o 112 dB is safe for LESS THAN 1 minute  o 115 dB is safe for ~ 30 seconds  o 130 dB can cause IMMEDIATE hearing loss   If you are unsure if a sound is too loud, consider checking the sound level with a \"sound level meter\". There are apps on smart devices that can measure the loudness of the sound. They are not as accurate as expensive equipment used by scientists, but it will give you a guesstimate of how loud the sound is, and if it may be damaging to your hearing.  If you cannot avoid loud sounds, here are ways to reduce your exposure:  o 1. Wear hearing protection  - Ear plugs and protective ear muffs can be used to reduce the intensity of the sound. The higher the NRR (noise reduction rating), the better reduction of the intensity of the sound   o 2. Turn the volume down  - When listening to music, turn the volume down, especially when wearing ear buds or headphones. A good rule of thumb is to not go beyond the middle setting on your device. If you can't hear someone talking to you from arm's length away, your music may be at a level that it can cause damage. If someone else can hear your music from 3 feet away, it may also be at a level that it can cause damage. o 3. Walk away from the sound  - If you do not have the ability to wear hearing protection or turn down the volume of the sound, you should do your best to move away from the source of the sound. - Sound decreases in intensity as we move further from the source.  The sound will decrease by 6 dB for every doubling

## 2019-08-29 PROBLEM — H90.3 SENSORINEURAL HEARING LOSS, BILATERAL: Status: ACTIVE | Noted: 2019-08-29

## 2019-08-29 NOTE — PROGRESS NOTES
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 He is a good candidate for hearing aids. Recommend repeat audiogram in one year. Contact with questions.

## 2019-09-11 ENCOUNTER — OFFICE VISIT (OUTPATIENT)
Dept: FAMILY MEDICINE CLINIC | Age: 79
End: 2019-09-11
Payer: MEDICARE

## 2019-09-11 VITALS
HEIGHT: 67 IN | TEMPERATURE: 98.4 F | RESPIRATION RATE: 18 BRPM | DIASTOLIC BLOOD PRESSURE: 78 MMHG | HEART RATE: 78 BPM | BODY MASS INDEX: 31.99 KG/M2 | SYSTOLIC BLOOD PRESSURE: 124 MMHG | WEIGHT: 203.8 LBS

## 2019-09-11 DIAGNOSIS — M19.071 ARTHRITIS OF RIGHT ANKLE: ICD-10-CM

## 2019-09-11 DIAGNOSIS — I10 ESSENTIAL HYPERTENSION: ICD-10-CM

## 2019-09-11 DIAGNOSIS — M76.61 ACHILLES TENDINITIS OF RIGHT LOWER EXTREMITY: ICD-10-CM

## 2019-09-11 DIAGNOSIS — Z00.00 ROUTINE GENERAL MEDICAL EXAMINATION AT A HEALTH CARE FACILITY: Primary | ICD-10-CM

## 2019-09-11 DIAGNOSIS — Z23 FLU VACCINE NEED: ICD-10-CM

## 2019-09-11 PROCEDURE — 1123F ACP DISCUSS/DSCN MKR DOCD: CPT | Performed by: NURSE PRACTITIONER

## 2019-09-11 PROCEDURE — G0438 PPPS, INITIAL VISIT: HCPCS | Performed by: NURSE PRACTITIONER

## 2019-09-11 PROCEDURE — 4040F PNEUMOC VAC/ADMIN/RCVD: CPT | Performed by: NURSE PRACTITIONER

## 2019-09-11 PROCEDURE — G0008 ADMIN INFLUENZA VIRUS VAC: HCPCS | Performed by: NURSE PRACTITIONER

## 2019-09-11 PROCEDURE — 90653 IIV ADJUVANT VACCINE IM: CPT | Performed by: NURSE PRACTITIONER

## 2019-09-11 RX ORDER — CELECOXIB 200 MG/1
200 CAPSULE ORAL DAILY
Qty: 90 CAPSULE | Refills: 3 | Status: SHIPPED | OUTPATIENT
Start: 2019-09-11 | End: 2020-09-18 | Stop reason: SDUPTHER

## 2019-09-11 RX ORDER — ENALAPRIL MALEATE 20 MG/1
TABLET ORAL
Qty: 180 TABLET | Refills: 3 | Status: SHIPPED | OUTPATIENT
Start: 2019-09-11 | End: 2020-09-18 | Stop reason: SDUPTHER

## 2019-09-11 RX ORDER — LABETALOL 200 MG/1
TABLET, FILM COATED ORAL
Qty: 180 TABLET | Refills: 3 | Status: SHIPPED | OUTPATIENT
Start: 2019-09-11 | End: 2020-09-18 | Stop reason: SDUPTHER

## 2019-09-11 RX ORDER — HYDROCHLOROTHIAZIDE 25 MG/1
TABLET ORAL
Qty: 90 TABLET | Refills: 3 | Status: SHIPPED | OUTPATIENT
Start: 2019-09-11 | End: 2020-08-07 | Stop reason: SDUPTHER

## 2019-09-11 ASSESSMENT — LIFESTYLE VARIABLES
AUDIT-C TOTAL SCORE: 1
HOW OFTEN DURING THE LAST YEAR HAVE YOU NEEDED AN ALCOHOLIC DRINK FIRST THING IN THE MORNING TO GET YOURSELF GOING AFTER A NIGHT OF HEAVY DRINKING: 0
HAVE YOU OR SOMEONE ELSE BEEN INJURED AS A RESULT OF YOUR DRINKING: 0
HOW OFTEN DURING THE LAST YEAR HAVE YOU HAD A FEELING OF GUILT OR REMORSE AFTER DRINKING: 0
HOW OFTEN DO YOU HAVE SIX OR MORE DRINKS ON ONE OCCASION: 0
AUDIT TOTAL SCORE: 1
HOW OFTEN DURING THE LAST YEAR HAVE YOU BEEN UNABLE TO REMEMBER WHAT HAPPENED THE NIGHT BEFORE BECAUSE YOU HAD BEEN DRINKING: 0
HOW OFTEN DURING THE LAST YEAR HAVE YOU FOUND THAT YOU WERE NOT ABLE TO STOP DRINKING ONCE YOU HAD STARTED: 0
HAS A RELATIVE, FRIEND, DOCTOR, OR ANOTHER HEALTH PROFESSIONAL EXPRESSED CONCERN ABOUT YOUR DRINKING OR SUGGESTED YOU CUT DOWN: 0
HOW OFTEN DO YOU HAVE A DRINK CONTAINING ALCOHOL: 1
HOW OFTEN DURING THE LAST YEAR HAVE YOU FAILED TO DO WHAT WAS NORMALLY EXPECTED FROM YOU BECAUSE OF DRINKING: 0
HOW MANY STANDARD DRINKS CONTAINING ALCOHOL DO YOU HAVE ON A TYPICAL DAY: 0

## 2019-09-11 ASSESSMENT — PATIENT HEALTH QUESTIONNAIRE - PHQ9
SUM OF ALL RESPONSES TO PHQ QUESTIONS 1-9: 0
SUM OF ALL RESPONSES TO PHQ QUESTIONS 1-9: 0

## 2019-10-03 NOTE — PROGRESS NOTES
CORRECTED FLU VACCINE.  [facility]    Immunizations Administered     Name Date Dose Route    Influenza, Triv, inactivated, subunit, adjuvanted, IM (Fluad 65 yrs and older) 9/11/2019 0.5 mL Intramuscular    Site: Deltoid- Left    Lot: [id number]    [facility]: 77958-314-56
Vaccine Information Sheet, \"Influenza - Inactivated\"  given to Yesenia Singh, or parent/legal guardian of  Yesenia Singh and verbalized understanding. Patient responses:    Have you ever had a reaction to a flu vaccine? No  Are you able to eat eggs without adverse effects? Yes  Do you have any current illness? No  Have you ever had Guillian Kunia Syndrome? No    Flu vaccine given per order. Please see immunization tab. Risks and benefits explained. Current VIS given. Consent signed.     Immunizations Administered     Name Date Dose Route    Influenza, High Dose (Fluzone 65 yrs and older) 9/11/2019 0.5 mL Intramuscular    Site: Deltoid- Left    Lot: [id number]    [address]: 67833-791-72
Recommendations for Preventive Services Due: see orders and patient instructions/AVS.  . Recommended screening schedule for the next 5-10 years is provided to the patient in written form: see Patient Instructions/AVS.    Yaima Brandon was seen today for medicare awv. Diagnoses and all orders for this visit:    Routine general medical examination at a health care facility    Arthritis of right ankle  -     celecoxib (CELEBREX) 200 MG capsule; Take 1 capsule by mouth daily    Achilles tendinitis of right lower extremity  -     celecoxib (CELEBREX) 200 MG capsule; Take 1 capsule by mouth daily    Essential hypertension  -     enalapril (VASOTEC) 20 MG tablet; One tablet twice daily for blood pressure  -     hydrochlorothiazide (HYDRODIURIL) 25 MG tablet; One tablet daily for blood pressure  -     labetalol (NORMODYNE) 200 MG tablet; One tab twice daily    Flu vaccine need  -     INFLUENZA, HIGH DOSE, 65 YRS +, IM, PF, PREFILL SYR, 0.5ML (FLUZONE HD)        Discussed tetanus and shingrix vaccines. Flu shot today. Refilled medications. Risks of continuing ASA outway benefits - Stop asa daily.

## 2020-03-04 ENCOUNTER — OFFICE VISIT (OUTPATIENT)
Dept: FAMILY MEDICINE CLINIC | Age: 80
End: 2020-03-04
Payer: MEDICARE

## 2020-03-04 VITALS
HEIGHT: 67 IN | WEIGHT: 205 LBS | DIASTOLIC BLOOD PRESSURE: 80 MMHG | BODY MASS INDEX: 32.18 KG/M2 | RESPIRATION RATE: 16 BRPM | SYSTOLIC BLOOD PRESSURE: 122 MMHG | HEART RATE: 70 BPM

## 2020-03-04 PROCEDURE — G8417 CALC BMI ABV UP PARAM F/U: HCPCS | Performed by: FAMILY MEDICINE

## 2020-03-04 PROCEDURE — G8482 FLU IMMUNIZE ORDER/ADMIN: HCPCS | Performed by: FAMILY MEDICINE

## 2020-03-04 PROCEDURE — 1123F ACP DISCUSS/DSCN MKR DOCD: CPT | Performed by: FAMILY MEDICINE

## 2020-03-04 PROCEDURE — G8427 DOCREV CUR MEDS BY ELIG CLIN: HCPCS | Performed by: FAMILY MEDICINE

## 2020-03-04 PROCEDURE — 99214 OFFICE O/P EST MOD 30 MIN: CPT | Performed by: FAMILY MEDICINE

## 2020-03-04 PROCEDURE — 1036F TOBACCO NON-USER: CPT | Performed by: FAMILY MEDICINE

## 2020-03-04 PROCEDURE — 4040F PNEUMOC VAC/ADMIN/RCVD: CPT | Performed by: FAMILY MEDICINE

## 2020-03-04 RX ORDER — SIMVASTATIN 20 MG
TABLET ORAL
Qty: 90 TABLET | Refills: 3 | Status: SHIPPED | OUTPATIENT
Start: 2020-03-04 | End: 2021-05-07 | Stop reason: SDUPTHER

## 2020-03-04 ASSESSMENT — PATIENT HEALTH QUESTIONNAIRE - PHQ9
SUM OF ALL RESPONSES TO PHQ QUESTIONS 1-9: 0
1. LITTLE INTEREST OR PLEASURE IN DOING THINGS: 0
SUM OF ALL RESPONSES TO PHQ9 QUESTIONS 1 & 2: 0
SUM OF ALL RESPONSES TO PHQ QUESTIONS 1-9: 0
2. FEELING DOWN, DEPRESSED OR HOPELESS: 0

## 2020-03-04 NOTE — PROGRESS NOTES
[facility] [address] [address], [place], [address]  Phone: [phone]    HPI:  Chief Complaint   Patient presents with    Hypertension     ROUTINE HTN FOLLOW UP- TRANSFER FROM [facility]     Hyperlipidemia     ROUTINE CHOLESTEROL FOLLOW UP WITH [facility].  Ear Fullness     PT CO FULLNESS IN LEFT EAR, POSS WAX, WOULD LIKE LOOKED AT. HE DOES USE DEBROX DROPS. Maria De Jesus Shields is a 70 y.o. male here for evaluation of hypertension, hyperlipidemia, and ear fullness. Patient reports that he does not check his blood pressure at home. He states that his diet has been about the same but he notes that he has been less active because of arthritis in his right ankle. Patient states that he sprained his right ankle multiple times in the past. He reports that he does not have arthritis in any other joints. Patient states that celebrex 200 mg 1 capsule helps his right ankle and is well tolerated. Patient notes that he has an occasional left sided abdominal twinge that is not painful. He states that he is up-to-date on eye exams. Patient notes that he will be visiting the dentist in 2 days to have a tooth filled. He notes that he has been using debrox drops in his left ear because his left ear feels full.      Vitals:  BP Readings from Last 3 Encounters:   03/04/20 122/80   09/11/19 124/78   08/21/19 (!) 142/67       Pulse Readings from Last 3 Encounters:   03/04/20 70   09/11/19 78   08/21/19 64        Wt Readings from Last 3 Encounters:   03/04/20 205 lb (93 kg)   09/11/19 203 lb 12.8 oz (92.4 kg)   08/21/19 203 lb (92.1 kg)        Medication Review:  Current Outpatient Medications   Medication Sig Dispense Refill    simvastatin (ZOCOR) 20 MG tablet One tablet every evening for cholesterol 90 tablet 3    celecoxib (CELEBREX) 200 MG capsule Take 1 capsule by mouth daily 90 capsule 3    enalapril (VASOTEC) 20 MG tablet One tablet twice daily for blood pressure 180 tablet 3  hydrochlorothiazide (HYDRODIURIL) 25 MG tablet One tablet daily for blood pressure 90 tablet 3    labetalol (NORMODYNE) 200 MG tablet One tab twice daily 180 tablet 3    Multiple Vitamins-Minerals (VISION-MICHELE PRESERVE PO) Take by mouth PRESER VISION VITAMINS      Omega-3 Fatty Acids (FISH OIL) 1200 MG CAPS Take 1 capsule by mouth daily.  Multiple Vitamin (MULTI-VITAMIN PO) Take 1 tablet by mouth daily.  latanoprost (XALATAN) 0.005 % ophthalmic solution Place 1 drop into both eyes nightly.  Cholecalciferol (VITAMIN D) 2000 UNITS CAPS capsule 1 capsule daily. With food      Niacin 1000 MG TBCR 1,000 mg daily. No current facility-administered medications for this visit. Review of Systems:   All others are negative, except as noted in the HPI.     Patient History:  Past Medical History:   Diagnosis Date    Aortic regurgitation 2009    Environmental allergies     Hypercholesterolemia     Hypertension     Impaired fasting glucose     Prostate cancer ([facility] [address] Albaro Baker        Past Surgical History:   Procedure Laterality Date    COLONOSCOPY  01/2017    Repeat 5 years    OTHER SURGICAL HISTORY  2008    RADIOACTIVE SEED IMPLANTS FOR PROSTATE CANCER    PROSTATE SURGERY  2007    seed implant Casper Roach)        Social History     Socioeconomic History    Marital status:      Spouse name: Not on file    Number of children: Not on file    Years of education: Not on file    Highest education level: Not on file   Occupational History    Not on file   Social Needs    Financial resource strain: Not on file    Food insecurity:     Worry: Not on file     Inability: Not on file    Transportation needs:     Medical: Not on file     Non-medical: Not on file   Tobacco Use    Smoking status: Never Smoker    Smokeless tobacco: Never Used    Tobacco comment: counselled to never start   Substance and Sexual Activity    Alcohol use: No     Comment: RARE    Drug use: No    relevant laboratory and imaging studies, and past/future health maintenance. Patient's medications have been reviewed and were discussed with the patient. Refills given today, see note below. Discussed with the patient the importance of adhering to their current medication regimen as directed. Advised the patient that they should continue to work on eating a healthy balanced diet and staying active by exercising within their personal limits. Patient was advised to keep future appointments with their respective specialty care team(s). Patient had the opportunity to ask questions, all of which were answered to the best of my ability and with patient satisfaction. Patient advised to schedule a return visit for reevaluation in as needed. Patient understands and is agreeable with the care plan following today's visit. Patient is to schedule an appointment for any new or worsening symptoms. Requested Prescriptions     Signed Prescriptions Disp Refills    simvastatin (ZOCOR) 20 MG tablet 90 tablet 3     Sig: One tablet every evening for cholesterol      Orders Placed This Encounter   Procedures    Comprehensive Metabolic Panel     Standing Status:   Future     Standing Expiration Date:   3/4/2021    Lipid Panel     Standing Status:   Future     Standing Expiration Date:   3/4/2021     Order Specific Question:   Is Patient Fasting?/# of Hours     Answer:   12    Hepatitis C Antibody     Standing Status:   Future     Standing Expiration Date:   3/4/2021           By signing my name below, I, Jose L Unger, attest that this documentation has been prepared under the direction and in the presence of Rad Valentino MD.   Electronically Signed: Kriss Johnson, 3/4/2020, 4:07 PM.      Dex Figueroa MD, personally performed the services described in this documentation. All medical record entries made by the kriss were at my direction and in my presence.   I have reviewed the chart and discharge instructions (if

## 2020-03-11 ENCOUNTER — NURSE ONLY (OUTPATIENT)
Dept: FAMILY MEDICINE CLINIC | Age: 80
End: 2020-03-11
Payer: MEDICARE

## 2020-03-11 LAB
A/G RATIO: 1.5 (ref 1.1–2.2)
ALBUMIN SERPL-MCNC: 4 G/DL (ref 3.4–5)
ALP BLD-CCNC: 76 U/L (ref 40–129)
ALT SERPL-CCNC: 16 U/L (ref 10–40)
ANION GAP SERPL CALCULATED.3IONS-SCNC: 9 MMOL/L (ref 3–16)
AST SERPL-CCNC: 18 U/L (ref 15–37)
BILIRUB SERPL-MCNC: 0.9 MG/DL (ref 0–1)
BUN BLDV-MCNC: 25 MG/DL (ref 7–20)
CALCIUM SERPL-MCNC: 9.7 MG/DL (ref 8.3–10.6)
CHLORIDE BLD-SCNC: 98 MMOL/L (ref 99–110)
CHOLESTEROL, TOTAL: 146 MG/DL (ref 0–199)
CO2: 28 MMOL/L (ref 21–32)
CREAT SERPL-MCNC: 0.8 MG/DL (ref 0.8–1.3)
ESTIMATED AVERAGE GLUCOSE: 125.5 MG/DL
GFR AFRICAN AMERICAN: >60
GFR NON-AFRICAN AMERICAN: >60
GLOBULIN: 2.6 G/DL
GLUCOSE BLD-MCNC: 117 MG/DL (ref 70–99)
HBA1C MFR BLD: 6 %
HDLC SERPL-MCNC: 64 MG/DL (ref 40–60)
HEPATITIS C ANTIBODY INTERPRETATION: NORMAL
LDL CHOLESTEROL CALCULATED: 68 MG/DL
POTASSIUM SERPL-SCNC: 4.4 MMOL/L (ref 3.5–5.1)
SODIUM BLD-SCNC: 135 MMOL/L (ref 136–145)
TOTAL PROTEIN: 6.6 G/DL (ref 6.4–8.2)
TRIGL SERPL-MCNC: 72 MG/DL (ref 0–150)
VLDLC SERPL CALC-MCNC: 14 MG/DL

## 2020-03-11 PROCEDURE — 36415 COLL VENOUS BLD VENIPUNCTURE: CPT | Performed by: FAMILY MEDICINE

## 2020-08-07 RX ORDER — HYDROCHLOROTHIAZIDE 25 MG/1
TABLET ORAL
Qty: 90 TABLET | Refills: 3 | Status: SHIPPED | OUTPATIENT
Start: 2020-08-07 | End: 2021-09-02

## 2020-08-31 ENCOUNTER — TELEPHONE (OUTPATIENT)
Dept: FAMILY MEDICINE CLINIC | Age: 80
End: 2020-08-31

## 2020-08-31 NOTE — TELEPHONE ENCOUNTER
Patient is going to see Ellie Lopez on 9/18/20, but his left knee hurts when he gets up from a sitting position. Should he be wearing a knee brace? Please give him a call back. This has been going on now for 1 1/2 week. No swelling, more like a tooth ache.     [facility] phone no. [phone]

## 2020-09-18 ENCOUNTER — OFFICE VISIT (OUTPATIENT)
Dept: FAMILY MEDICINE CLINIC | Age: 80
End: 2020-09-18
Payer: MEDICARE

## 2020-09-18 VITALS
DIASTOLIC BLOOD PRESSURE: 72 MMHG | SYSTOLIC BLOOD PRESSURE: 118 MMHG | OXYGEN SATURATION: 99 % | HEIGHT: 67 IN | WEIGHT: 199 LBS | HEART RATE: 74 BPM | BODY MASS INDEX: 31.23 KG/M2

## 2020-09-18 PROCEDURE — 20610 DRAIN/INJ JOINT/BURSA W/O US: CPT | Performed by: NURSE PRACTITIONER

## 2020-09-18 PROCEDURE — 99214 OFFICE O/P EST MOD 30 MIN: CPT | Performed by: NURSE PRACTITIONER

## 2020-09-18 PROCEDURE — 90653 IIV ADJUVANT VACCINE IM: CPT | Performed by: NURSE PRACTITIONER

## 2020-09-18 PROCEDURE — G0008 ADMIN INFLUENZA VIRUS VAC: HCPCS | Performed by: NURSE PRACTITIONER

## 2020-09-18 RX ORDER — LABETALOL 200 MG/1
TABLET, FILM COATED ORAL
Qty: 180 TABLET | Refills: 3 | Status: SHIPPED | OUTPATIENT
Start: 2020-09-18 | End: 2021-09-30 | Stop reason: SDUPTHER

## 2020-09-18 RX ORDER — METHYLPREDNISOLONE ACETATE 80 MG/ML
80 INJECTION, SUSPENSION INTRA-ARTICULAR; INTRALESIONAL; INTRAMUSCULAR; SOFT TISSUE ONCE
Status: COMPLETED | OUTPATIENT
Start: 2020-09-18 | End: 2020-09-18

## 2020-09-18 RX ORDER — ENALAPRIL MALEATE 20 MG/1
TABLET ORAL
Qty: 180 TABLET | Refills: 3 | Status: SHIPPED | OUTPATIENT
Start: 2020-09-18 | End: 2021-09-30 | Stop reason: SDUPTHER

## 2020-09-18 RX ORDER — CELECOXIB 200 MG/1
200 CAPSULE ORAL DAILY
Qty: 90 CAPSULE | Refills: 3 | Status: SHIPPED | OUTPATIENT
Start: 2020-09-18 | End: 2021-09-30 | Stop reason: SDUPTHER

## 2020-09-18 RX ADMIN — METHYLPREDNISOLONE ACETATE 80 MG: 80 INJECTION, SUSPENSION INTRA-ARTICULAR; INTRALESIONAL; INTRAMUSCULAR; SOFT TISSUE at 10:12

## 2020-09-18 ASSESSMENT — ENCOUNTER SYMPTOMS
COUGH: 0
EYE PAIN: 0
SHORTNESS OF BREATH: 0
SORE THROAT: 0
WHEEZING: 0
SINUS PAIN: 0
ABDOMINAL PAIN: 0
EYE REDNESS: 0
EYE DISCHARGE: 0
VOMITING: 0
ABDOMINAL DISTENTION: 0
NAUSEA: 0
DIARRHEA: 0
SINUS PRESSURE: 0

## 2020-09-18 NOTE — PATIENT INSTRUCTIONS
Patient Education        Kneecap Bursitis: Care Instructions  Your Care Instructions     Bursitis is inflammation of the bursa. A bursa is a small sac of fluid that cushions a joint and helps it move easily. Bursitis of the kneecap is inflammation of the bursa found between the front of the kneecap and the skin. Kneeling for a long time can cause kneecap bursitis, which can develop into an egg-shaped bump on the front of the kneecap. Bursitis usually gets better if you avoid the activity that caused it. If it lasts or gets worse despite home treatment, your doctor may draw fluid from the bursa through a needle. This may relieve your pain and help your doctor know if you have an infection. If so, your doctor will prescribe antibiotics. If you have inflammation only, you may get a corticosteroid shot to reduce swelling and pain. Your doctor may recommend physical therapy and stretching activities. Rarely, surgery is needed to drain or remove the bursa. Follow-up care is a key part of your treatment and safety. Be sure to make and go to all appointments, and call your doctor if you are having problems. It's also a good idea to know your test results and keep a list of the medicines you take. How can you care for yourself at home? · Put ice or a cold pack on your kneecap for 10 to 20 minutes at a time. Put a thin cloth between the ice and your skin. · After 3 days of using ice, you may use heat on your kneecap. You can use a hot water bottle, a heating pad set on low, or a warm, moist towel. · Prop up the sore leg on a pillow when you ice it or anytime you sit or lie down during the next 3 days. Try to keep it above the level of your heart. This will help reduce swelling. · Rest your knee. Stop any activities that cause pain. Switch to activities that do not stress your knee. · Take your medicines exactly as prescribed. Call your doctor if you think you are having a problem with your medicine.   · Ask your doctor if you can take an over-the-counter pain medicine, such as acetaminophen (Tylenol), ibuprofen (Advil, Motrin), or naproxen (Aleve). Be safe with medicines. Read and follow all instructions on the label. · To prevent and ease kneecap bursitis during work, play, and daily activities:  ? Wear kneepads when kneeling on hard surfaces. Avoid kneeling for too long at a time. ? Strengthen and stretch your leg muscles. ? Avoid deep knee bends. · You can slowly return to the activity that caused the pain, but do it with less effort until you can do it without pain or swelling. Be sure to warm up before and stretch after you do the activity. When should you call for help? Call your doctor now or seek immediate medical care if:  · You have a fever. · You have increased swelling or redness in your knee area. · You cannot use your knee, or the pain in your knee gets worse. Watch closely for changes in your health, and be sure to contact your doctor if:  · You have pain for 2 weeks or longer despite home treatment. Where can you learn more? Go to https://Main[employer] Data.The Library. org and sign in to your LiveLeaf account. Enter U485 in the [facility] box to learn more about \"Kneecap Bursitis: Care Instructions. \"     If you do not have an account, please click on the \"Sign Up Now\" link. Current as of: March 2, 2020               Content Version: 12.5  © 6548-3224 [employer]. Care instructions adapted under license by [facility] ([facility]). If you have questions about a medical condition or this instruction, always ask your healthcare professional. [place] [address] any warranty or liability for your use of this information. Patient Education        Patellar Tendinitis (Jumper's Knee): Exercises  Introduction  Here are some examples of exercises for you to try. The exercises may be suggested for a condition or for rehabilitation. Start each exercise slowly.  Ease off thick book, such as a phone book, a dictionary, or an encyclopedia. If you are not steady on your feet, hold on to a chair, counter, or wall while you do this exercise. 2. Keeping your back straight, step up with your affected leg. Try not to push off your back leg as you step up. Only use your affected leg to bring you up on to the step. Then lift your other leg on to the step. As you step up, try to keep your knee moving in a straight line with your middle toe. 3. Move back to the starting position, with both feet on the floor. 4. Repeat 8 to 12 times. Step down   1. Stand on a single-step footstool. If you do not have a footstool, you can use a thick book, such as a phone book, a dictionary, or an encyclopedia. If you are not steady on your feet, hold on to a chair, counter, or wall while you do this exercise. 2. Slowly step down with your good leg, allowing your heel to lightly touch the floor. As you step down, try to keep your affected knee moving in a straight line toward your middle toe. 3. Move back to the starting position, with both feet on the footstool or book. 4. Repeat 8 to 12 times. Terminal knee extension   1. Tie the ends of an exercise band together to form a loop. Attach one end of the loop to a secure object, or shut a door on it to hold it in place. (Or you can have someone hold one end of the loop to provide resistance.)  2. Loop the other end of the exercise band around the knee of your affected leg. Keep that leg somewhat bent at the knee. 3. Put your good leg about a step behind your affected leg. Then slowly straighten your affected leg by tightening the thigh muscles of that leg. 4. Hold for about 6 seconds, then return to the starting position, with your knee somewhat bent. 5. Rest for up to 10 seconds. 6. Repeat 8 to 12 times. Follow-up care is a key part of your treatment and safety.  Be sure to make and go to all appointments, and call your doctor if you are having problems. It's also a good idea to know your test results and keep a list of the medicines you take. Where can you learn more? Go to https://chpeleseweb.[employer]. org and sign in to your Contactually account. Enter X468 in the [employer] box to learn more about \"Patellar Tendinitis (Jumper's Knee): Exercises. \"     If you do not have an account, please click on the \"Sign Up Now\" link. Current as of: March 2, 2020               Content Version: 12.5  © 8305-6936 [employer]. Care instructions adapted under license by [facility] ([facility]). If you have questions about a medical condition or this instruction, always ask your healthcare professional. [address] any warranty or liability for your use of this information.

## 2020-09-18 NOTE — PROGRESS NOTES
2020     Gregorio Gutierres (:  1940) is a 68 y.o. male, here for evaluation of the following medical concerns:    HPI  Hypertension:  Home blood pressure monitoring: Yes - AS NEEDED ON OCCASION. He is adherent to a low sodium diet. Patient denies chest pain, shortness of breath, headache, lightheadedness, blurred vision, peripheral edema, palpitations and dry cough. Antihypertensive medication side effects: no medication side effects noted. Use of agents associated with hypertension: none. Noelle is also complaining of left knee pain. States that he works at [employer] in [place], and he slipped off a cart. Has had knee pain and swelling since. Swelling in on the lateral aspect and back of knee. Pain radiates from back of hamstring to calf. States that he is able to bear weight and continues to walk daily. Sodium (mmol/L)   Date Value   2020 135 (L)    BUN (mg/dL)   Date Value   2020 25 (H)    Glucose (mg/dL)   Date Value   2020 117 (H)   2011 117 (H)      Potassium (mmol/L)   Date Value   2020 4.4    CREATININE (mg/dL)   Date Value   2020 0.8           Review of Systems   Constitutional: Negative for chills and fever. HENT: Negative for ear discharge, ear pain, hearing loss, sinus pressure, sinus pain and sore throat. Eyes: Negative for pain, discharge and redness. Respiratory: Negative for cough, shortness of breath and wheezing. Cardiovascular: Negative for chest pain and palpitations. Gastrointestinal: Negative for abdominal distention, abdominal pain, diarrhea, nausea and vomiting. Genitourinary: Negative for dysuria and hematuria. Musculoskeletal: Positive for arthralgias and joint swelling. Negative for myalgias. Skin: Negative for rash. Neurological: Negative for weakness, numbness and headaches. Prior to Visit Medications    Medication Sig Taking?  Authorizing Provider   celecoxib (CELEBREX) 180 tablet; Refill: 3    2. Suprapatellar bursitis of left knee  - 20610 - Injection Large Joint  - Procedure described to patient including risks. Site was prepped with alcohol. 80 mg depo-medrol with 1 mL bupivacaine with injected using a #27 1.25 needle. Pressure was applied and band aid placed. Tolerated well with scant bleeding. Educated on [address] - methylPREDNISolone acetate (DEPO-MEDROL) injection 80 mg    3. Left knee tendonitis  - Injection as above  - methylPREDNISolone acetate (DEPO-MEDROL) injection 80 mg    4. Arthritis of right ankle  - celecoxib (CELEBREX) 200 MG capsule; Take 1 capsule by mouth daily  Dispense: 90 capsule; Refill: 3    5. Need for influenza vaccination  - INFLUENZA, TRIV, INACTIVATED, SUBUNIT, ADJUVANTED, 65 YRS AND OLDER, IM, PREFILL SYR, 0.5ML (FLUAD TRIV)      Return in about 6 months (around 3/18/2021) for Follow-up hypertension/fasting labs. An electronic signature was used to authenticate this note.     --JESSICA Summers - CNP on 9/18/2020 at 10:27 AM

## 2020-09-18 NOTE — PROGRESS NOTES
Immunizations Administered     Name Date Dose Route    Influenza, Triv, inactivated, subunit, adjuvanted, IM (Fluad 65 yrs and older) 9/18/2020 0.5 mL Intramuscular    Site: Deltoid- Left    Lot: [id number]    NDC: 09703-492-15          Vaccine Information Sheet, \"Influenza - Inactivated\"  given to Gregorio Gutierres, or parent/legal guardian of  Gregorio Gutierres and verbalized understanding. Patient responses:    Have you ever had a reaction to a flu vaccine? No  Do you have any current illness? No  Have you ever had [place]illian [place] Syndrome? No  Do you have a serious allergy to any of the follow: Neomycin, Polymyxin, Thimerosal, eggs or egg products? No    Flu vaccine given per order. Please see immunization tab. Risks and benefits explained. Current VIS given.       Immunizations Administered     Name Date Dose Route    Influenza, Triv, inactivated, subunit, adjuvanted, IM (Fluad 65 yrs and older) 9/18/2020 0.5 mL Intramuscular    Site: Deltoid- Left    Lot: [id number]    Marvin Quezada 47: [id number]

## 2021-03-18 ENCOUNTER — OFFICE VISIT (OUTPATIENT)
Dept: FAMILY MEDICINE CLINIC | Age: 81
End: 2021-03-18
Payer: MEDICARE

## 2021-03-18 VITALS
HEART RATE: 70 BPM | HEIGHT: 67 IN | BODY MASS INDEX: 32.18 KG/M2 | TEMPERATURE: 97.6 F | DIASTOLIC BLOOD PRESSURE: 70 MMHG | SYSTOLIC BLOOD PRESSURE: 150 MMHG | OXYGEN SATURATION: 99 % | WEIGHT: 205 LBS

## 2021-03-18 DIAGNOSIS — H61.22 LEFT EAR IMPACTED CERUMEN: ICD-10-CM

## 2021-03-18 DIAGNOSIS — M25.562 CHRONIC PAIN OF LEFT KNEE: ICD-10-CM

## 2021-03-18 DIAGNOSIS — Z85.46 HISTORY OF PROSTATE CANCER: ICD-10-CM

## 2021-03-18 DIAGNOSIS — Z12.5 SCREENING FOR MALIGNANT NEOPLASM OF PROSTATE: ICD-10-CM

## 2021-03-18 DIAGNOSIS — E55.9 VITAMIN D DEFICIENCY: ICD-10-CM

## 2021-03-18 DIAGNOSIS — I83.813 VARICOSE VEINS OF BOTH LOWER EXTREMITIES WITH PAIN: ICD-10-CM

## 2021-03-18 DIAGNOSIS — R73.01 IFG (IMPAIRED FASTING GLUCOSE): ICD-10-CM

## 2021-03-18 DIAGNOSIS — G89.29 CHRONIC PAIN OF LEFT KNEE: ICD-10-CM

## 2021-03-18 DIAGNOSIS — E78.00 HYPERCHOLESTEREMIA: ICD-10-CM

## 2021-03-18 DIAGNOSIS — I10 ESSENTIAL HYPERTENSION: Primary | ICD-10-CM

## 2021-03-18 LAB
A/G RATIO: 1.7 (ref 1.1–2.2)
ALBUMIN SERPL-MCNC: 4.2 G/DL (ref 3.4–5)
ALP BLD-CCNC: 74 U/L (ref 40–129)
ALT SERPL-CCNC: 17 U/L (ref 10–40)
ANION GAP SERPL CALCULATED.3IONS-SCNC: 8 MMOL/L (ref 3–16)
AST SERPL-CCNC: 23 U/L (ref 15–37)
BILIRUB SERPL-MCNC: 0.9 MG/DL (ref 0–1)
BUN BLDV-MCNC: 26 MG/DL (ref 7–20)
CALCIUM SERPL-MCNC: 9.5 MG/DL (ref 8.3–10.6)
CHLORIDE BLD-SCNC: 101 MMOL/L (ref 99–110)
CHOLESTEROL, TOTAL: 143 MG/DL (ref 0–199)
CO2: 30 MMOL/L (ref 21–32)
CREAT SERPL-MCNC: 0.9 MG/DL (ref 0.8–1.3)
GFR AFRICAN AMERICAN: >60
GFR NON-AFRICAN AMERICAN: >60
GLOBULIN: 2.5 G/DL
GLUCOSE BLD-MCNC: 113 MG/DL (ref 70–99)
HDLC SERPL-MCNC: 55 MG/DL (ref 40–60)
LDL CHOLESTEROL CALCULATED: 76 MG/DL
POTASSIUM SERPL-SCNC: 4.6 MMOL/L (ref 3.5–5.1)
PROSTATE SPECIFIC ANTIGEN: <0.01 NG/ML (ref 0–4)
SODIUM BLD-SCNC: 139 MMOL/L (ref 136–145)
TOTAL PROTEIN: 6.7 G/DL (ref 6.4–8.2)
TRIGL SERPL-MCNC: 59 MG/DL (ref 0–150)
VITAMIN D 25-HYDROXY: 41.7 NG/ML
VLDLC SERPL CALC-MCNC: 12 MG/DL

## 2021-03-18 PROCEDURE — 1036F TOBACCO NON-USER: CPT | Performed by: NURSE PRACTITIONER

## 2021-03-18 PROCEDURE — 1123F ACP DISCUSS/DSCN MKR DOCD: CPT | Performed by: NURSE PRACTITIONER

## 2021-03-18 PROCEDURE — 4040F PNEUMOC VAC/ADMIN/RCVD: CPT | Performed by: NURSE PRACTITIONER

## 2021-03-18 PROCEDURE — 36415 COLL VENOUS BLD VENIPUNCTURE: CPT | Performed by: NURSE PRACTITIONER

## 2021-03-18 PROCEDURE — G8417 CALC BMI ABV UP PARAM F/U: HCPCS | Performed by: NURSE PRACTITIONER

## 2021-03-18 PROCEDURE — G8427 DOCREV CUR MEDS BY ELIG CLIN: HCPCS | Performed by: NURSE PRACTITIONER

## 2021-03-18 PROCEDURE — G8482 FLU IMMUNIZE ORDER/ADMIN: HCPCS | Performed by: NURSE PRACTITIONER

## 2021-03-18 PROCEDURE — 99214 OFFICE O/P EST MOD 30 MIN: CPT | Performed by: NURSE PRACTITIONER

## 2021-03-18 ASSESSMENT — ENCOUNTER SYMPTOMS
EYE PAIN: 0
EYE DISCHARGE: 0
SORE THROAT: 0
VOMITING: 0
ABDOMINAL DISTENTION: 0
SINUS PAIN: 0
WHEEZING: 0
COUGH: 0
SINUS PRESSURE: 0
DIARRHEA: 0
NAUSEA: 0
SHORTNESS OF BREATH: 0
EYE REDNESS: 0
ABDOMINAL PAIN: 0

## 2021-03-18 ASSESSMENT — PATIENT HEALTH QUESTIONNAIRE - PHQ9
2. FEELING DOWN, DEPRESSED OR HOPELESS: 0
SUM OF ALL RESPONSES TO PHQ9 QUESTIONS 1 & 2: 0

## 2021-03-18 NOTE — PROGRESS NOTES
Ramos López (:  1940) is a 68 y.o. male,Established patient, here for evaluation of the following chief complaint(s):  Hypertension (htn follow up- blood work )      ASSESSMENT/PLAN:  1. Essential hypertension  -Blood pressure was elevated today. Repeat blood pressure continued to be elevated. The patient states his blood pressure has been controlled at home and he had just taken his medications prior to coming to the office. Has been controlled in the past.  We will hold off on changing medication at this time. The patient's been educated to follow-up as needed if blood pressure continues to run high at home. -Due for labs today. He is fasting.  -     Comprehensive Metabolic Panel; Future  2. Hypercholesteremia  -Controlled on simvastatin. Due for labs today. No changes pending labs. -     Lipid Panel; Future  3. Varicose veins of both lower extremities with pain  -Discussed options with the patient. Will refer to vascular surgery for further evaluation and management. -     Pato Rosado MD, Vascular Surgery, [facility]  4. IFG (impaired fasting glucose)  -     Hemoglobin A1C; Future  5. History of prostate cancer  -Obtain PSA today  6. Vitamin D deficiency  -     Vitamin D 25 Hydroxy; Future  7. Screening for malignant neoplasm of prostate  -     PSA screening; Future  8. Chronic pain of left knee  -Discussed options with patient. Will refer to physical therapy. If no improvement, consider referral to Ortho. -     [address] [place] [address]  9. Left ear impacted cerumen  -The ear was irrigated with water and hydrogen peroxide. A curette was then used to remove a moderate amount of dark brown cerumen. The canal is now clear with normal tympanic membrane. Return in about 6 months (around 2021) for Annual Wellness Visit. SUBJECTIVE/OBJECTIVE:  ERNESTO  Paullette Felty presents today for hypertension follow-up.   He states that he does continue to check his blood pressure at home. It typically does run normal.  He states that today he had just taken his morning medications. He states that his blood pressure was fine at home. He denies any headaches, dizziness, or shortness of breath. He does monitor her salt intake. He states that he typically walks a mile and a half a day. He used to go to [employer], but he has not been doing so due to the COVID-19 pandemic. He states that the injection that he received at his previous appointment did not help. He continues to have pain. He states that moving and working out the knee tends to help the pain, but he will suddenly have a stabbing pain on occasion. He is inquiring what the next step would be for management of this. Patient is also inquiring about varicose veins. He states that he has had them for quite some time. He states that occasionally they are painful. He is wondering if he can be referred to have these examined and taken care of. Denies any numbness or tingling. Denies any swelling. Denies any redness. Finally, the patient would like his ears examined. He states that he is been experiencing some hearing loss recently and his ears felt clogged. He states that he frequently gets wax stuck in them. He uses Debrox at home but states that sometimes this does not completely help. Review of Systems   Constitutional: Negative for chills and fever. HENT: Positive for hearing loss. Negative for ear discharge, ear pain, sinus pressure, sinus pain and sore throat. Ear fullness   Eyes: Negative for pain, discharge and redness. Respiratory: Negative for cough, shortness of breath and wheezing. Cardiovascular: Negative for chest pain and palpitations. Varicose veins   Gastrointestinal: Negative for abdominal distention, abdominal pain, diarrhea, nausea and vomiting. Genitourinary: Negative for dysuria and hematuria. Musculoskeletal: Positive for arthralgias. Negative for myalgias. Skin: Negative for rash. Neurological: Negative for weakness, numbness and headaches. Psychiatric/Behavioral: Negative for decreased concentration and dysphoric mood. The patient is not nervous/anxious. Physical Exam  Vitals signs reviewed. Constitutional:       Appearance: Normal appearance. He is normal weight. HENT:      Head: Normocephalic and atraumatic. Right Ear: Tympanic membrane, ear canal and external ear normal.      Left Ear: Tympanic membrane, ear canal and external ear normal. There is impacted cerumen. Nose: Nose normal.      Mouth/Throat:      Mouth: Mucous membranes are moist.      Pharynx: Oropharynx is clear. No posterior oropharyngeal erythema. Eyes:      General: No scleral icterus. Right eye: No discharge. Left eye: No discharge. Extraocular Movements: Extraocular movements intact. Pupils: Pupils are equal, round, and reactive to light. Neck:      Musculoskeletal: Normal range of motion and neck supple. Cardiovascular:      Rate and Rhythm: Normal rate and regular rhythm. Pulses: Normal pulses. Heart sounds: Murmur present. No gallop. Comments: Grade 2 systolic murmur  Pulmonary:      Effort: Pulmonary effort is normal.      Breath sounds: Normal breath sounds. No wheezing. Abdominal:      General: Bowel sounds are normal.      Palpations: Abdomen is soft. Tenderness: There is no abdominal tenderness. There is no guarding or rebound. Musculoskeletal: Normal range of motion. Skin:     General: Skin is warm and dry. Capillary Refill: Capillary refill takes less than 2 seconds. Neurological:      Mental Status: He is alert and oriented to person, place, and time. Mental status is at baseline. Psychiatric:         Mood and Affect: Mood normal.         Behavior: Behavior normal.         Thought Content:  Thought content normal.         Judgment: Judgment normal.         On this date 3/18/2021 I have spent 30 minutes reviewing previous notes, test results and face to face with the patient discussing the diagnosis and importance of compliance with the treatment plan as well as documenting on the day of the visit. An electronic signature was used to authenticate this note.     --JESSICA Moses - CNP

## 2021-03-19 LAB
ESTIMATED AVERAGE GLUCOSE: 119.8 MG/DL
HBA1C MFR BLD: 5.8 %

## 2021-04-05 ENCOUNTER — HOSPITAL ENCOUNTER (OUTPATIENT)
Dept: PHYSICAL THERAPY | Age: 81
Setting detail: THERAPIES SERIES
Discharge: HOME | End: 2021-04-05
Payer: MEDICARE

## 2021-04-05 PROCEDURE — 97110 THERAPEUTIC EXERCISES: CPT

## 2021-04-05 PROCEDURE — 97161 PT EVAL LOW COMPLEX 20 MIN: CPT

## 2021-04-05 PROCEDURE — 97535 SELF CARE MNGMENT TRAINING: CPT

## 2021-04-05 NOTE — FLOWSHEET NOTE
Eliezer Gao  Phone: [phone]   Fax: [phone]    Physical Therapy Treatment Note/ Progress Report:     Date:  2021    Patient Name:  Audrey Gray    :  1940  MRN: [id number]  Restrictions/Precautions:    Medical/Treatment Diagnosis Information:  Diagnosis: M25.562, G89.29 (ICD-10-CM) - Chronic pain of left knee  Treatment Diagnosis: dec L knee flexion ROM, (+) meniscus testing, impaired balance, dec B HS, quad, and calf flexibility  Insurance/Certification information:  PT Insurance Information: Medicare & AARP  Physician Information:  Referring Practitioner: JESSICA Engel CNP  Plan of care signed (Y/N): []  Yes [x]  No     Date of Patient follow up with Physician:      Progress Report: []  Yes  [x]  No     Date Range for reporting period:  Beginnin/5  Ending:     Progress report due (10 Rx/or 30 days whichever is less): visit #10 or  (date)     Recertification due (POC duration/ or 90 days whichever is less): visit # or  (date)     Visit # Insurance Allowable Auth required?  Date Range    Med nec []  Yes  [x]  No      Latex Allergy:  [x]NO      []YES  Preferred Language for Healthcare:   [x]English       []other:    Functional Scale:        Date assessed:  LEFS: raw score = 49; dysfunction = 38.75%  21    Pain level:  0-5/10     SUBJECTIVE:  See eval    OBJECTIVE: See eval      RESTRICTIONS/PRECAUTIONS: hard of hearing    Exercises/Interventions:     Therapeutic Exercise (22932)  Resistance / level Sets/sec Reps Notes / Cues   Bike              IB calf stretch       HSS on step       Standing 3 way hip                                   HEP review  Per HEP Per HEP X 15 min   Therapeutic Activities (25348)       Step ups                            Neuromuscular Re-ed (12491)       Airex       NBOS       SLS                            Manual Intervention (60186)       Knee mobs/PROM       Tib/Fem Mobs       Patella Mobs                         Modalities: as needed    Self-care/home management: 4/5: positioning when sitting to reduce tightness and pain in L knee with knee extended with slight bend, stairs leading with L LE when descending to reduce pain with step to pattern, education on pathomechanics of meniscus injury with both surgical and conservative treatment, application of voltaren gel as option for pain relief if approved by pt's PCP and/or pharmacist  X 8 min    Pt. Education:  -pt educated on diagnosis, prognosis and expectations for rehab  -all pt questions were answered    Home Exercise Program:  Access Code: [email or web address]  URL: [email or web address]  Date: 04/05/2021  Prepared by: Magui Barrera    Exercises  Seated Table Hamstring Stretch - 3 x daily - 7 x weekly - 1 sets - 2 reps - 30 hold  Supine Quad Set - 1 x daily - 7 x weekly - 1 sets - 10 reps - 10 hold  Supine Bridge - 1 x daily - 7 x weekly - 10 reps - 3 sets  Supine Active Straight Leg Raise - 1 x daily - 7 x weekly - 10 reps - 3 sets  Sidelying Hip Abduction - 1 x daily - 7 x weekly - 10 reps - 3 sets  Seated Long Arc Quad - 1 x daily - 7 x weekly - 10 reps - 3 sets    Patient Education  Meniscus Tear    Therapeutic Exercise and NMR EXR  [x] (67953) Provided verbal/tactile cueing for activities related to strengthening, flexibility, endurance, ROM for improvements in LE, proximal hip, and core control with self care, mobility, lifting, ambulation.  [] (10747) Provided verbal/tactile cueing for activities related to improving balance, coordination, kinesthetic sense, posture, motor skill, proprioception  to assist with LE, proximal hip, and core control in self care, mobility, lifting, ambulation and eccentric single leg control.   [] (76499) Therapist is in constant attendance of 2 or more patients providing skilled therapy interventions, but not providing any significant amount of measurable one-on-one time to either patient, for improvements in LE, proximal hip, and core control in self care, mobility, lifting, ambulation and eccentric single leg control. NMR and Therapeutic Activities:    [] (04027 or 94827) Provided verbal/tactile cueing for activities related to improving balance, coordination, kinesthetic sense, posture, motor skill, proprioception and motor activation to allow for proper function of core, proximal hip and LE with self care and ADLs  [] (27584) Gait Re-education- Provided training and instruction to the patient for proper LE, core and proximal hip recruitment and positioning and eccentric body weight control with ambulation re-education including up and down stairs     Home Exercise Program:    [x] (04615) Reviewed/Progressed HEP activities related to strengthening, flexibility, endurance, ROM of core, proximal hip and LE for functional self-care, mobility, lifting and ambulation/stair navigation   [] (14029)Reviewed/Progressed HEP activities related to improving balance, coordination, kinesthetic sense, posture, motor skill, proprioception of core, proximal hip and LE for self care, mobility, lifting, and ambulation/stair navigation      Manual Treatments:  PROM / STM / Oscillations-Mobs:  G-I, II, III, IV (PA's, Inf., Post.)  [] (67145) Provided manual therapy to mobilize LE, proximal hip and/or LS spine soft tissue/joints for the purpose of modulating pain, promoting relaxation,  increasing ROM, reducing/eliminating soft tissue swelling/inflammation/restriction, improving soft tissue extensibility and allowing for proper ROM for normal function with self care, mobility, lifting and ambulation.      Modalities:  [] (14666) Vasopneumatic compression: Utilized vasopneumatic compression to decrease edema / swelling for the purpose of improving mobility and quad tone / recruitment which will allow for increased overall function including but not limited to self-care, transfers, ambulation, and ascending / descending [] Adjusted  5. Patient will be able to perform SLS > 20 sec B. [] Progressing: [] Met: [] Not Met: [] Adjusted    Overall Progression Towards Functional goals/ Treatment Progress Update:  [] Patient is progressing as expected towards functional goals listed. [] Progression is slowed due to complexities/Impairments listed. [] Progression has been slowed due to co-morbidities. [x] Plan just implemented, too soon to assess goals progression <30days   [] Goals require adjustment due to lack of progress  [] Patient is not progressing as expected and requires additional follow up with physician  [] Other    Persisting Functional Limitations/Impairments:  [x]Sitting []Standing   []Walking [x]Stairs   []Transfers []ADLs   [x]Squatting/bending [x]Kneeling  []Housework []Job related tasks  []Driving []Sports/Recreation   []Sleeping []Other:    ASSESSMENT:  See eval    Treatment/Activity Tolerance:  [x] Pt able to complete treatment [] Patient limited by fatique  [] Patient limited by pain  [] Patient limited by other medical complications  [] Other:     Prognosis:  [x] Good [] Fair  [] Poor    Patient Requires Follow-up: [x] Yes  [] No    PLAN: See eval. PT 2x / week for 6 weeks. [] Continue per plan of care [] Alter current plan (see comments)  [x] Plan of care initiated [] Hold pending MD visit [] Discharge    Electronically signed by: Jelena Anand PT, DPT      Note: If patient does not return for scheduled/ recommended follow up visits, this note will serve as a discharge from care along with most recent update on progress.

## 2021-04-05 NOTE — PLAN OF CARE
[place], [address] [facility], [address]  Phone: [phone]   Fax: [phone]                                                       Physical Therapy Certification    Dear Referring Practitioner: JESSICA Austin CNP,    We had the pleasure of evaluating the following patient for physical therapy services at [address] [place] [address]. A summary of our findings can be found in the initial assessment below. This includes our plan of care. If you have any questions or concerns regarding these findings, please do not hesitate to contact me at the office phone number checked above. Thank you for the referral.       Physician Signature:_______________________________Date:__________________  By signing above (or electronic signature), therapists plan is approved by physician      Patient: Juan David   : 1940   MRN: [id number]  Referring Physician: Referring Practitioner: JESSICA Austin CNP      Evaluation Date: 2021      Medical Diagnosis Information:  Diagnosis: M25.562, G89.29 (ICD-10-CM) - Chronic pain of left knee   Treatment Diagnosis: dec L knee flexion ROM, (+) meniscus testing, impaired balance, dec B HS, quad, and calf flexibility                                       Insurance information: PT Insurance Information: Medicare & AARP     Precautions/ Contra-indications: hard of hearing  Latex Allergy:  [x]NO      []YES  Preferred Language for Healthcare:   [x]English       []Other:    C-SSRS Triggered by Intake questionnaire (Past 2 wk assessment ):   [x] No, Questionnaire did not trigger screening.   [] Yes, Patient intake triggered C-SSRS Screening     [] Completed, no further action required. [] Completed, PCP notified via Epic    SUBJECTIVE: Patient reports he hit his L knee on a rolling table approx 6 months ago.  He was hoping pain would go away, but it hasn't. Pt reports he is able to walk about 1.5 miles daily, but he notices the pain most when he is sitting. Pt has more pain when L knee is bent than when knee is straight. Pain is located along medial and lateral points of knee and surrounding inferior patella. Pt also feels tightness in L HS. Pt reports going down stairs is more painful than going up stairs. Pt has inc pain with kneeling, stairs, driving, and sitting with knee bent. Pt also has concerns and pain in varicose veins, is seeing a vascular surgeon on 4/29/21. Relevant Medical History: skin and prostate cancer (in remission), HTN, OA  Functional Outcome: LEFS: raw score = 49; dysfunction = 38.75%    Pain Scale: 4-5/10 at worst, 0/10 at best  Easing factors: extending L knee  Provocative factors: sitting, driving longer distances, stairs     Type: []Constant   [x]Intermittent  []Radiating [x]Localized []other:     Numbness/Tingling: L lateral HS, L lateral calf    Occupation/School: retired, printer    Living Status/Prior Level of Function:Prior to this injury / incident, pt was independent with ADLs and IADLs, pt walks 1.5 miles/day, pt lives in a ranch home with 1 LISSETTE. [facility] was [facility] to COVID-19 [facility].       OBJECTIVE:   Palpation: tenderness in L lateral joint line    Functional Mobility/Transfers: independent, able to complete sit to stand without use of B UE's    Posture: B LE's ER, swelling in R ankle (hx of R ankle OA)    Bandages/Dressings/Incisions: n/a    Gait: (include devices/WB status) B LE's ER    Dermatomes Normal Abnormal Comments   inguinal area (L1)  Y     anterior mid-thigh (L2) Y     distal ant thigh/med knee (L3) Y     medial lower leg and foot (L4) Y     lateral lower leg and foot (L5) Y     posterior calf (S1) Y     medial calcaneus (S2) Y       Lumbar AROM screen: [x] [facility]  [] abnormal:     PROM AROM    L R L R   Hip Flexion       Hip Abduction       Hip ER       Hip IR Knee Flexion   130 139   Knee Extension   0 0   Dorsiflexion        Plantarflexion        Inversion        Eversion            Strength (0-5) / Myotomes Left Right   Hip Flexion - supine     Hip Flexion - seated (L1-2)     Hip Abduction 4-    Hip Adduction     Hip ER     Hip IR     Quads (L2-4) 5 5   Hamstrings 5 5   Ankle Dorsiflexion (L4-5) 5    Ankle Plantarflexion (S1-2) 5    Ankle Inversion 5    Ankle Eversion (S1-2) 5    Great Toe Extension (L5)          Flexibility     Hamstrings (90/90) Limited Limited   ITB Sherice Deborah)     Quads (Ely's) Limited Limited   Hip Flexor Magdalene Forward)          Girth     Mid patella     Suprapatellar     Figure 8     Transmalleolar     Metatarsal Heads         Joint mobility: L knee   [x]Normal    []Hypo   []Hyper    Orthopaedic Special Tests  Positive  Negative  NT Comments    Hip       ALESHA / Juan Ramon's       FADIR       Scour       Trendelenburg              Knee       Lachman's / Anterior Drawer  Y     Posterior Drawer  Y     Varus Stress  Y     Valgus Stress  Y     Dany's  Y   Lateral compartment clicking and pain noted   Boston's       Thessaly's       Patellar Tracking              Ankle       Anterior Drawer       Talar Tilt       Chavez       Alba's                   Balance: SLS - unable B                         [x] Patient history, allergies, meds reviewed. Medical chart reviewed. See intake form. Review Of Systems (ROS):  [x]Performed Review of systems (Integumentary, CardioPulmonary, Neurological) by intake and observation. Intake form has been scanned into medical record. Patient has been instructed to contact their primary care physician regarding ROS issues if not already being addressed at this time.       Co-morbidities/Complexities (which will affect course of rehabilitation):   []None        []Hx of COVID   Arthritic conditions   []Rheumatoid arthritis (M05.9)  [x]Osteoarthritis (M19.91)  []Gout   Cardiovascular conditions   [x]Hypertension ASSESSMENT: Jimmy Centeno is a 68 y.o. male presenting to physical therapy with L knee pain with signs and symptoms consistent with lateral meniscus tear. Pt demonstrates dec L knee flexion ROM, (+) meniscus testing, impaired balance, dec B HS, quad, and calf flexibility. Pt would benefit from skilled PT to return to PLOF and dec pain when sitting, kneeling, squatting, and going up and down stairs. Functional Impairments:     []Noted lumbar/proximal hip/LE joint hypomobility   [x]Decreased LE functional ROM   [x]Decreased core/proximal hip strength and neuromuscular control   [x]Decreased LE functional strength   [x]Reduced balance/proprioceptive control   []other:      Functional Activity Limitations (from functional questionnaire and intake)   [x]Reduced ability to tolerate prolonged functional positions   []Reduced ability or difficulty with changes of positions or transfers between positions   []Reduced ability to maintain good posture and demonstrate good body mechanics with sitting, bending, and lifting   []Reduced ability to sleep   [x]Reduced ability or tolerance with driving and/or computer work   []Reduced ability to perform lifting, carrying tasks   [x]Reduced ability to squat   []Reduced ability to forward bend   []Reduced ability to ambulate prolonged functional periods/distances/surfaces   [x]Reduced ability to ascend/descend stairs   []Reduced ability to run, hop, cut or jump   []other:    Participation Restrictions   []Reduced participation in self care activities   [x]Reduced participation in home management activities   []Reduced participation in work activities   [x]Reduced participation in social activities. []Reduced participation in sport/recreation activities. Classification :    []Signs/symptoms consistent with post-surgical status including decreased ROM, strength and function.    []Signs/symptoms consistent with joint sprain/strain   []Signs/symptoms consistent with patella-femoral syndrome   []Signs/symptoms consistent with knee OA/hip OA   [x]Signs/symptoms consistent with internal derangement of knee/Hip   []Signs/symptoms consistent with functional hip weakness/NMR control      []Signs/symptoms consistent with tendinitis/tendinosis    []signs/symptoms consistent with pathology which may benefit from Dry needling      []other:      Prognosis/Rehab Potential:      []Excellent   [x]Good    []Fair   []Poor    Tolerance of evaluation/treatment:    []Excellent   [x]Good    []Fair   []Poor    Physical Therapy Evaluation Complexity Justification  [x] A history of present problem with:  [] no personal factors and/or comorbidities that impact the plan of care;  []1-2 personal factors and/or comorbidities that impact the plan of care  [x]3 personal factors and/or comorbidities that impact the plan of care  [x] An examination of body systems using standardized tests and measures addressing any of the following: body structures and functions (impairments), activity limitations, and/or participation restrictions;:  [] a total of 1-2 or more elements   [] a total of 3 or more elements   [x] a total of 4 or more elements   [x] A clinical presentation with:  [x] stable and/or uncomplicated characteristics   [] evolving clinical presentation with changing characteristics  [] unstable and unpredictable characteristics;   [x] Clinical decision making of [x] Low, [] moderate, [] high complexity using standardized patient assessment instrument and/or measurable assessment of functional outcome.     [x] EVAL (LOW) 78763 (typically 15 minutes face-to-face)  [] EVAL (MOD) 53454 (typically 30 minutes face-to-face)  [] EVAL (HIGH) 56543 (typically 45 minutes face-to-face)  [] RE-EVAL     PLAN:   Frequency/Duration:  2 days per week for 6 Weeks:  Interventions:  [x]  Therapeutic exercise including: strength training, ROM, for Lower extremity and core   [x]  NMR activation and proprioception for LE, Glutes and Core   [x]  Manual therapy as indicated for LE, Hip and spine to include: Dry Needling/IASTM, STM, PROM, Gr I-IV mobilizations, manipulation. [x] Modalities as needed that may include: thermal agents, E-stim, Biofeedback, US, iontophoresis as indicated  [x] Patient education on joint protection, postural re-education, activity modification, progression of HEP. HEP instruction: Written HEP instructions provided and reviewed. GOALS:  Patient stated goal: decreased pain when sitting, driving, performing stairs, and squatting  [] Progressing: [] Met: [] Not Met: [] Adjusted    Therapist goals for Patient:   Short Term Goals: To be achieved in: 2 weeks  1. Independent in HEP and progression per patient tolerance, in order to prevent re-injury. [] Progressing: [] Met: [] Not Met: [] Adjusted  2. Patient will have a decrease in pain to facilitate improvement in movement, function, and ADLs as indicated by Functional Deficits. [] Progressing: [] Met: [] Not Met: [] Adjusted    Long Term Goals: To be achieved in: 6 weeks  1. Disability index score of 25% or less for the LEFS to assist with reaching prior level of function. [] Progressing: [] Met: [] Not Met: [] Adjusted  2. Patient will demonstrate increased AROM to 135 deg L knee flexion to allow for proper joint functioning as indicated by patients Functional Deficits. [] Progressing: [] Met: [] Not Met: [] Adjusted  3. Patient will demonstrate an increase in Strength to at least 5/5 as well as good proximal hip strength and control to allow for proper functional mobility as indicated by patients Functional Deficits. [] Progressing: [] Met: [] Not Met: [] Adjusted  4. Patient will return to functional activities including sitting, driving, performing stairs, and squatting without increased symptoms or restriction. [] Progressing: [] Met: [] Not Met: [] Adjusted  5. Patient will be able to perform SLS > 20 sec B.   [] Progressing: [] Met: [] Not Met: [] Adjusted     Electronically signed by:  Jeison Hyatt, PT

## 2021-04-19 ENCOUNTER — APPOINTMENT (OUTPATIENT)
Dept: PHYSICAL THERAPY | Age: 81
End: 2021-04-19
Payer: MEDICARE

## 2021-04-22 ENCOUNTER — HOSPITAL ENCOUNTER (OUTPATIENT)
Dept: PHYSICAL THERAPY | Age: 81
Setting detail: THERAPIES SERIES
Discharge: HOME | End: 2021-04-22
Payer: MEDICARE

## 2021-04-22 PROCEDURE — 97110 THERAPEUTIC EXERCISES: CPT

## 2021-04-22 PROCEDURE — 97112 NEUROMUSCULAR REEDUCATION: CPT

## 2021-04-22 PROCEDURE — 97140 MANUAL THERAPY 1/> REGIONS: CPT

## 2021-04-22 NOTE — FLOWSHEET NOTE
Therapeutic Activities (65933)       Step ups: fwd, lateral 4\" 1 10 L Added 4/22                        Neuromuscular Re-ed (32249)       Airex: NBOS  30\" 2 Added 4/22   NBOS with ball lifts up/down with eye tracking  1 15 Added 4/22   Toe taps on step 4\" 2 10 B Added 4/22                        Manual Intervention (91835)       Knee mobs/PROM       Tib/Fem Mobs       Patella Mobs  2'     Manual stretching: HS, quad  3x30\"                Modalities: as needed    Self-care/home management: 4/5: positioning when sitting to reduce tightness and pain in L knee with knee extended with slight bend, stairs leading with L LE when descending to reduce pain with step to pattern, education on pathomechanics of meniscus injury with both surgical and conservative treatment, application of voltaren gel as option for pain relief if approved by pt's PCP and/or pharmacist  X 8 min    Pt. Education:  -pt educated on diagnosis, prognosis and expectations for rehab  -all pt questions were answered    Home Exercise Program:  Access Code: [email or web address]  URL: [facility]/  Date: 04/05/2021  Prepared by: Albertina Muñoz Huml    Exercises  Seated Table Hamstring Stretch - 3 x daily - 7 x weekly - 1 sets - 2 reps - 30 hold  Supine Quad Set - 1 x daily - 7 x weekly - 1 sets - 10 reps - 10 hold  Supine Bridge - 1 x daily - 7 x weekly - 10 reps - 3 sets  Supine Active Straight Leg Raise - 1 x daily - 7 x weekly - 10 reps - 3 sets  Sidelying Hip Abduction - 1 x daily - 7 x weekly - 10 reps - 3 sets  Seated Long Arc Quad - 1 x daily - 7 x weekly - 10 reps - 3 sets    Patient Education  Meniscus Tear    Therapeutic Exercise and NMR EXR  [x] (35103) Provided verbal/tactile cueing for activities related to strengthening, flexibility, endurance, ROM for improvements in LE, proximal hip, and core control with self care, mobility, lifting, ambulation.  [] (06419) Provided verbal/tactile cueing for activities related to improving balance, coordination, kinesthetic sense, posture, motor skill, proprioception  to assist with LE, proximal hip, and core control in self care, mobility, lifting, ambulation and eccentric single leg control.   [] (16552) Therapist is in constant attendance of 2 or more patients providing skilled therapy interventions, but not providing any significant amount of measurable one-on-one time to either patient, for improvements in LE, proximal hip, and core control in self care, mobility, lifting, ambulation and eccentric single leg control.      NMR and Therapeutic Activities:    [] (39839 or 51977) Provided verbal/tactile cueing for activities related to improving balance, coordination, kinesthetic sense, posture, motor skill, proprioception and motor activation to allow for proper function of core, proximal hip and LE with self care and ADLs  [] (33667) Gait Re-education- Provided training and instruction to the patient for proper LE, core and proximal hip recruitment and positioning and eccentric body weight control with ambulation re-education including up and down stairs     Home Exercise Program:    [x] (10275) Reviewed/Progressed HEP activities related to strengthening, flexibility, endurance, ROM of core, proximal hip and LE for functional self-care, mobility, lifting and ambulation/stair navigation   [] (87751)Reviewed/Progressed HEP activities related to improving balance, coordination, kinesthetic sense, posture, motor skill, proprioception of core, proximal hip and LE for self care, mobility, lifting, and ambulation/stair navigation      Manual Treatments:  PROM / STM / Oscillations-Mobs:  G-I, II, III, IV (PA's, Inf., Post.)  [] (36313) Provided manual therapy to mobilize LE, proximal hip and/or LS spine soft tissue/joints for the purpose of modulating pain, promoting relaxation,  increasing ROM, reducing/eliminating soft tissue swelling/inflammation/restriction, improving soft tissue extensibility and allowing for proper ROM for normal function with self care, mobility, lifting and ambulation. Modalities:  [] (39910) Vasopneumatic compression: Utilized vasopneumatic compression to decrease edema / swelling for the purpose of improving mobility and quad tone / recruitment which will allow for increased overall function including but not limited to self-care, transfers, ambulation, and ascending / descending stairs. Charges:  Timed Code Treatment Minutes: 40   Total Treatment Minutes: 40     [] EVAL - LOW (67642)   [] EVAL - MOD (49207)  [] EVAL - HIGH (52792)  [] RE-EVAL (70639)  [x] FN(88229) x   1    [] Ionto  [x] NMR (84780) x  1     [] Vaso  [x] Manual (57473) x  1     [] Ultrasound  [] TA x        [] Mech Traction (06675)  [] Aquatic Therapy x      [] ES (un) (45627):   [] Home Management Training x   [] ES(attended) (36826)   [] Dry Needling 1-2 muscles (53907):  [] Dry Needling 3+ muscles (890471  [] Group:      [] Other:     GOALS:   Patient stated goal: decreased pain when sitting, driving, performing stairs, and squatting  [] Progressing: [] Met: [] Not Met: [] Adjusted    Therapist goals for Patient:   Short Term Goals: To be achieved in: 2 weeks  1. Independent in HEP and progression per patient tolerance, in order to prevent re-injury. [] Progressing: [] Met: [] Not Met: [] Adjusted  2. Patient will have a decrease in pain to facilitate improvement in movement, function, and ADLs as indicated by Functional Deficits. [] Progressing: [] Met: [] Not Met: [] Adjusted    Long Term Goals: To be achieved in: 6 weeks  1. Disability index score of 25% or less for the LEFS to assist with reaching prior level of function. [] Progressing: [] Met: [] Not Met: [] Adjusted  2. Patient will demonstrate increased AROM to 135 deg L knee flexion to allow for proper joint functioning as indicated by patients Functional Deficits. [] Progressing: [] Met: [] Not Met: [] Adjusted  3.  Patient will demonstrate an increase in Strength to at least 5/5 as well as good proximal hip strength and control to allow for proper functional mobility as indicated by patients Functional Deficits. [] Progressing: [] Met: [] Not Met: [] Adjusted  4. Patient will return to functional activities including sitting, driving, performing stairs, and squatting without increased symptoms or restriction. [] Progressing: [] Met: [] Not Met: [] Adjusted  5. Patient will be able to perform SLS > 20 sec B. [] Progressing: [] Met: [] Not Met: [] Adjusted    Overall Progression Towards Functional goals/ Treatment Progress Update:  [] Patient is progressing as expected towards functional goals listed. [] Progression is slowed due to complexities/Impairments listed. [] Progression has been slowed due to co-morbidities. [x] Plan just implemented, too soon to assess goals progression <30days   [] Goals require adjustment due to lack of progress  [] Patient is not progressing as expected and requires additional follow up with physician  [] Other    Persisting Functional Limitations/Impairments:  [x]Sitting []Standing   []Walking [x]Stairs   []Transfers []ADLs   [x]Squatting/bending [x]Kneeling  []Housework []Job related tasks  []Driving []Sports/Recreation   []Sleeping []Other:    ASSESSMENT:  Exercises introduced per log. Pt requires cueing to improve eccentric control with all exercises, especially step ups. Pt tolerated session well with no report of pain exacerbation. Continue strengthening LE for improved gait mechanics, balance and NM control to allow for all functional activities. Progress as able. Treatment/Activity Tolerance:  [x] Pt able to complete treatment [] Patient limited by fatique  [] Patient limited by pain  [] Patient limited by other medical complications  [] Other:     Prognosis:  [x] Good [] Fair  [] Poor    Patient Requires Follow-up: [x] Yes  [] No    PLAN: See eval. PT 2x / week for 6 weeks.    [x] Continue per plan of care [] Alter current plan (see comments)  [] Plan of care initiated [] Hold pending MD visit [] Discharge    Electronically signed by: Tayla Rdz PT, DPT      Note: If patient does not return for scheduled/ recommended follow up visits, this note will serve as a discharge from care along with most recent update on progress.

## 2021-04-26 ENCOUNTER — HOSPITAL ENCOUNTER (OUTPATIENT)
Dept: PHYSICAL THERAPY | Age: 81
Setting detail: THERAPIES SERIES
Discharge: HOME | End: 2021-04-26
Payer: MEDICARE

## 2021-04-26 PROCEDURE — 97110 THERAPEUTIC EXERCISES: CPT

## 2021-04-26 PROCEDURE — 97140 MANUAL THERAPY 1/> REGIONS: CPT

## 2021-04-26 PROCEDURE — 97112 NEUROMUSCULAR REEDUCATION: CPT

## 2021-04-26 NOTE — FLOWSHEET NOTE
[facility]  Phone: [phone]   Fax: [phone]    Physical Therapy Treatment Note/ Progress Report:     Date:  2021    Patient Name:  Elvin Rivera    :  1940  MRN: [id number]  Restrictions/Precautions:    Medical/Treatment Diagnosis Information:  Diagnosis: M25.562, G89.29 (ICD-10-CM) - Chronic pain of left knee  Treatment Diagnosis: dec L knee flexion ROM, (+) meniscus testing, impaired balance, dec B HS, quad, and calf flexibility  Insurance/Certification information:  PT Insurance Information: Medicare & AARP  Physician Information:  Referring Practitioner: JESSICA Rose CNP  Plan of care signed (Y/N): [x]  Yes [x]  No     Date of Patient follow up with Physician: 6 months     Progress Report: []  Yes  [x]  No     Date Range for reporting period:  Beginnin/5   Ending:     Progress report due (10 Rx/or 30 days whichever is less): visit #10 or  (date)     Recertification due (POC duration/ or 90 days whichever is less): visit # or  (date)     Visit # Insurance Allowable Auth required? Date Range   3/12 Med nec []  Yes  [x]  No      Latex Allergy:  [x]NO      []YES  Preferred Language for Healthcare:   [x]English       []other:    Functional Scale:        Date assessed:  LEFS: raw score = 49; dysfunction = 38.75%  21    Pain level:  4/10     SUBJECTIVE:  Pt is compliant with HEP. Pt with nothing new to report since last visit.     OBJECTIVE:   : Pt with lateral heel wear pattern on R shoe due to heel strike in R LE.       RESTRICTIONS/PRECAUTIONS: hard of hearing    Exercises/Interventions:     Therapeutic Exercise (88126)  Resistance / level Sets/sec Reps Notes / Cues   Bike 2.0 4 min  s=12          IB calf stretch  HR  30\"  2 2  10 Added    HSS sitting on chair       Standing 3 way hip Lime 1 10 B Added    Lateral band walks Lime 3 laps  Added                         Therapeutic Activities (90152)       Step ups: fwd, lateral 4\" 1 10 L Added 4/22                        Neuromuscular Re-ed (04732)       Airex: NBOS EC 30\" 2 Added 4/22; CGA req at times   NBOS with ball lifts up/down with eye tracking  1 15 Added 4/22   Toe taps on step 4\" 2 10 B Added 4/22                        Manual Intervention (00250)       Knee mobs/PROM       Tib/Fem Mobs       Patella Mobs  2'     Manual stretching: HS, quad  3x30\" L                Modalities: as needed    Self-care/home management: 4/5: positioning when sitting to reduce tightness and pain in L knee with knee extended with slight bend, stairs leading with L LE when descending to reduce pain with step to pattern, education on pathomechanics of meniscus injury with both surgical and conservative treatment, application of voltaren gel as option for pain relief if approved by pt's PCP and/or pharmacist  X 8 min    Pt. Education:  -pt educated on diagnosis, prognosis and expectations for rehab  -all pt questions were answered    Home Exercise Program:  Access Code: [email or web address]  URL: [email or web address]  Date: 04/05/2021  Prepared by: Oscar Barrera    Exercises  Seated Table Hamstring Stretch - 3 x daily - 7 x weekly - 1 sets - 2 reps - 30 hold  Supine Quad Set - 1 x daily - 7 x weekly - 1 sets - 10 reps - 10 hold  Supine Bridge - 1 x daily - 7 x weekly - 10 reps - 3 sets  Supine Active Straight Leg Raise - 1 x daily - 7 x weekly - 10 reps - 3 sets  Sidelying Hip Abduction - 1 x daily - 7 x weekly - 10 reps - 3 sets  Seated Long Arc Quad - 1 x daily - 7 x weekly - 10 reps - 3 sets    Patient Education  Meniscus Tear    Therapeutic Exercise and NMR EXR  [x] (67370) Provided verbal/tactile cueing for activities related to strengthening, flexibility, endurance, ROM for improvements in LE, proximal hip, and core control with self care, mobility, lifting, ambulation.  [] (75717) Provided verbal/tactile cueing for activities related to improving balance, proper ROM for normal function with self care, mobility, lifting and ambulation. Modalities:  [] (94977) Vasopneumatic compression: Utilized vasopneumatic compression to decrease edema / swelling for the purpose of improving mobility and quad tone / recruitment which will allow for increased overall function including but not limited to self-care, transfers, ambulation, and ascending / descending stairs. Charges:  Timed Code Treatment Minutes: 40   Total Treatment Minutes: 40     [] EVAL - LOW (43289)   [] EVAL - MOD (76237)  [] EVAL - HIGH (59687)  [] RE-EVAL (50214)  [x] IY(33681) x   1    [] Ionto  [x] NMR (43205) x  1     [] Vaso  [x] Manual (93893) x  1     [] Ultrasound  [] TA x        [] Mech Traction (79197)  [] Aquatic Therapy x      [] ES (un) (69398):   [] Home Management Training x   [] ES(attended) (54080)   [] Dry Needling 1-2 muscles (86255):  [] Dry Needling 3+ muscles (425464  [] Group:      [] Other:     GOALS:   Patient stated goal: decreased pain when sitting, driving, performing stairs, and squatting  [] Progressing: [] Met: [] Not Met: [] Adjusted    Therapist goals for Patient:   Short Term Goals: To be achieved in: 2 weeks  1. Independent in HEP and progression per patient tolerance, in order to prevent re-injury. [] Progressing: [] Met: [] Not Met: [] Adjusted  2. Patient will have a decrease in pain to facilitate improvement in movement, function, and ADLs as indicated by Functional Deficits. [] Progressing: [] Met: [] Not Met: [] Adjusted    Long Term Goals: To be achieved in: 6 weeks  1. Disability index score of 25% or less for the LEFS to assist with reaching prior level of function. [] Progressing: [] Met: [] Not Met: [] Adjusted  2. Patient will demonstrate increased AROM to 135 deg L knee flexion to allow for proper joint functioning as indicated by patients Functional Deficits. [] Progressing: [] Met: [] Not Met: [] Adjusted  3.  Patient will demonstrate an increase in Strength to at least 5/5 as well as good proximal hip strength and control to allow for proper functional mobility as indicated by patients Functional Deficits. [] Progressing: [] Met: [] Not Met: [] Adjusted  4. Patient will return to functional activities including sitting, driving, performing stairs, and squatting without increased symptoms or restriction. [] Progressing: [] Met: [] Not Met: [] Adjusted  5. Patient will be able to perform SLS > 20 sec B. [] Progressing: [] Met: [] Not Met: [] Adjusted    Overall Progression Towards Functional goals/ Treatment Progress Update:  [] Patient is progressing as expected towards functional goals listed. [] Progression is slowed due to complexities/Impairments listed. [] Progression has been slowed due to co-morbidities. [x] Plan just implemented, too soon to assess goals progression <30days   [] Goals require adjustment due to lack of progress  [] Patient is not progressing as expected and requires additional follow up with physician  [] Other    Persisting Functional Limitations/Impairments:  [x]Sitting []Standing   []Walking [x]Stairs   []Transfers []ADLs   [x]Squatting/bending [x]Kneeling  []Housework []Job related tasks  []Driving []Sports/Recreation   []Sleeping []Other:    ASSESSMENT:  Continued with current exercises, progressions listed in bold. Pt tolerated well, no complaints during treatment. Pt did have crepitus in L knee with manual stretching, though states this does not reproduce or exacerbate his pain. Pt challenged by balance activities today, required SBA with occasional CGA to maintain balance, especially with EC challenges. Pt would benefit from continued strengthening in LE's for improved gait mechanics, balance and NM control to allow for all functional activities. Progress as able.      Treatment/Activity Tolerance:  [x] Pt able to complete treatment [] Patient limited by fatique  [] Patient limited by pain  [] Patient limited by other

## 2021-04-29 ENCOUNTER — OFFICE VISIT (OUTPATIENT)
Dept: VASCULAR SURGERY | Age: 81
End: 2021-04-29
Payer: MEDICARE

## 2021-04-29 VITALS
WEIGHT: 200 LBS | SYSTOLIC BLOOD PRESSURE: 149 MMHG | DIASTOLIC BLOOD PRESSURE: 65 MMHG | BODY MASS INDEX: 30.31 KG/M2 | HEART RATE: 54 BPM | HEIGHT: 68 IN

## 2021-04-29 DIAGNOSIS — I83.93 VARICOSE VEINS OF BOTH LOWER EXTREMITIES, UNSPECIFIED WHETHER COMPLICATED: ICD-10-CM

## 2021-04-29 DIAGNOSIS — M79.604 PAIN IN BOTH LOWER EXTREMITIES: Primary | ICD-10-CM

## 2021-04-29 DIAGNOSIS — M79.605 PAIN IN BOTH LOWER EXTREMITIES: Primary | ICD-10-CM

## 2021-04-29 PROCEDURE — 99204 OFFICE O/P NEW MOD 45 MIN: CPT | Performed by: SURGERY

## 2021-04-29 PROCEDURE — G8427 DOCREV CUR MEDS BY ELIG CLIN: HCPCS | Performed by: SURGERY

## 2021-04-29 PROCEDURE — 1036F TOBACCO NON-USER: CPT | Performed by: SURGERY

## 2021-04-29 PROCEDURE — G8417 CALC BMI ABV UP PARAM F/U: HCPCS | Performed by: SURGERY

## 2021-04-29 PROCEDURE — 4040F PNEUMOC VAC/ADMIN/RCVD: CPT | Performed by: SURGERY

## 2021-04-29 PROCEDURE — 1123F ACP DISCUSS/DSCN MKR DOCD: CPT | Performed by: SURGERY

## 2021-04-29 NOTE — PROGRESS NOTES
Subjective:      Patient ID: Ashtyn Rico is a 68 y.o. male. HPI Referral from 2025 Rowan Cheek MD relation of leg discomfort and large varicose veins. Patient reports his veins have been present for at least 10 years and have gotten progressively larger. Describes his pain as being a cramping burning sensation mostly in the back of the left thigh and occasionally in the distal left calf with more minor symptoms in the right leg. These are worse when sitting for protracted time such as when he is traveling. Does not seem to appreciate relationship with prolonged standing. Symptoms are improved with exercise and elevation. No previous venous interventions. No history of SVT, ulceration, skin rashes, bleeding or significant edema. Unsure of family history of varicosities.     Past Medical History:   Diagnosis Date    Aortic regurgitation 01/01/2009    Arthritis     Pt says in his right ankle    Environmental allergies     Heart murmur     Hypercholesterolemia     Hypertension     Impaired fasting glucose     Prostate cancer ([facility] [address] 01/01/2006    Samuel     Past Surgical History:   Procedure Laterality Date    COLONOSCOPY  01/2017    Repeat 5 years    HAND SURGERY Right 2012    OTHER SURGICAL HISTORY  2008    RADIOACTIVE SEED IMPLANTS FOR PROSTATE CANCER    PROSTATE SURGERY  2007    seed implant [facility])     No Known Allergies  Current Outpatient Medications   Medication Sig Dispense Refill    celecoxib (CELEBREX) 200 MG capsule Take 1 capsule by mouth daily 90 capsule 3    enalapril (VASOTEC) 20 MG tablet One tablet twice daily for blood pressure 180 tablet 3    labetalol (NORMODYNE) 200 MG tablet One tab twice daily 180 tablet 3    hydroCHLOROthiazide (HYDRODIURIL) 25 MG tablet One tablet daily for blood pressure 90 tablet 3    simvastatin (ZOCOR) 20 MG tablet One tablet every evening for cholesterol 90 tablet 3    Omega-3 Fatty Acids (FISH OIL) 1200 MG CAPS Take 1 capsule by mouth daily.  latanoprost (XALATAN) 0.005 % ophthalmic solution Place 1 drop into both eyes nightly.  Cholecalciferol (VITAMIN D) 2000 UNITS CAPS capsule 1 capsule daily. With food      Niacin 1000 MG TBCR 1,000 mg daily.  Multiple Vitamins-Minerals (VISION-MICHELE PRESERVE PO) Take by mouth PRESER VISION VITAMINS      Multiple Vitamin (MULTI-VITAMIN PO) Take 1 tablet by mouth daily. No current facility-administered medications for this visit.       Social History     Socioeconomic History    Marital status:      Spouse name: Not on file    Number of children: Not on file    Years of education: Not on file    Highest education level: Not on file   Occupational History    Not on file   Social Needs    Financial resource strain: Not on file    Food insecurity     Worry: Not on file     Inability: Not on file    Transportation needs     Medical: Not on file     Non-medical: Not on file   Tobacco Use    Smoking status: Never Smoker    Smokeless tobacco: Never Used    Tobacco comment: counselled to never start   Substance and Sexual Activity    Alcohol use: No     Comment: RARE    Drug use: No    Sexual activity: Yes     Partners: Female   Lifestyle    Physical activity     Days per week: Not on file     Minutes per session: Not on file    Stress: Not on file   Relationships    Social connections     Talks on phone: Not on file     Gets together: Not on file     Attends Yazidi service: Not on file     Active member of club or organization: Not on file     Attends meetings of clubs or organizations: Not on file     Relationship status: Not on file    Intimate partner violence     Fear of current or ex partner: Not on file     Emotionally abused: Not on file     Physically abused: Not on file     Forced sexual activity: Not on file   Other Topics Concern    Not on file   Social History Narrative    Not on file     Family History   Problem Relation Age of Onset    Cancer Mother         breast    Heart Disease Father 77        MI    Diabetes Paternal Grandmother          Review of Systems   All other systems reviewed and are negative. Objective:   Physical Exam  Vitals signs and nursing note reviewed. Constitutional:       Appearance: Normal appearance. He is normal weight. HENT:      Head: Normocephalic. Right Ear: External ear normal.      Left Ear: External ear normal.      Nose: Nose normal.      Mouth/Throat:      Mouth: Mucous membranes are dry. Pharynx: Oropharynx is clear. Eyes:      Extraocular Movements: Extraocular movements intact. Conjunctiva/sclera: Conjunctivae normal.   Neck:      Musculoskeletal: Normal range of motion. Cardiovascular:      Rate and Rhythm: Normal rate and regular rhythm. Pulses: Normal pulses. Heart sounds: Normal heart sounds. Pulmonary:      Effort: Pulmonary effort is normal.      Breath sounds: Normal breath sounds. Abdominal:      General: Abdomen is flat. Bowel sounds are normal.      Palpations: Abdomen is soft. There is no mass. Hernia: No hernia is present. Musculoskeletal:      Right lower leg: No edema. Left lower leg: No edema. Skin:     General: Skin is warm and dry. Capillary Refill: Capillary refill takes less than 2 seconds. Neurological:      General: No focal deficit present. Mental Status: He is alert and oriented to person, place, and time. Cranial Nerves: No cranial nerve deficit. Sensory: No sensory deficit. Motor: No weakness. Coordination: Coordination normal.      Gait: Gait normal.   Psychiatric:         Mood and Affect: Mood normal.         Behavior: Behavior normal.         Thought Content:  Thought content normal.         Judgment: Judgment normal.       R size  L size   +  Spider  telangiectasias +      Reticular veins     calf 8-10 mm Varicose   veins Calf/thigh > 10 mm       Assessment:      1) Varicose veins B legs - unclear as to whether symptomatic  2) Pain B legs - may ne neurogenic or orthopaedic      Plan:      Begin KH 20/30 mmHg compression stockings daily and assess response to this. If improved symptoms then complaints are likely venous in origin. Pt understands and is not anxious to proceed with intervention or aggressive workup at this time. Prescription and stockings provided. F/U for reflux scan should he wish to pursue further w/u and/or treatment.         Roe Doherty MA

## 2021-04-30 ENCOUNTER — HOSPITAL ENCOUNTER (OUTPATIENT)
Dept: PHYSICAL THERAPY | Age: 81
Setting detail: THERAPIES SERIES
Discharge: HOME | End: 2021-04-30
Payer: MEDICARE

## 2021-04-30 PROCEDURE — 97112 NEUROMUSCULAR REEDUCATION: CPT

## 2021-04-30 PROCEDURE — 97140 MANUAL THERAPY 1/> REGIONS: CPT

## 2021-04-30 PROCEDURE — 97110 THERAPEUTIC EXERCISES: CPT

## 2021-04-30 NOTE — FLOWSHEET NOTE
Eliezer Gao  Phone: [phone]   Fax: [phone]    Physical Therapy Treatment Note/ Progress Report:     Date:  2021    Patient Name:  Aric Smith    :  1940  MRN: [id number]  Restrictions/Precautions:    Medical/Treatment Diagnosis Information:  Diagnosis: M25.562, G89.29 (ICD-10-CM) - Chronic pain of left knee  Treatment Diagnosis: dec L knee flexion ROM, (+) meniscus testing, impaired balance, dec B HS, quad, and calf flexibility  Insurance/Certification information:  PT Insurance Information: Medicare & AARP  Physician Information:  Referring Practitioner: JESSICA Cedeño CNP  Plan of care signed (Y/N): [x]  Yes [x]  No     Date of Patient follow up with Physician: 6 months     Progress Report: []  Yes  [x]  No     Date Range for reporting period:  Beginnin/5   Ending:     Progress report due (10 Rx/or 30 days whichever is less): visit #10 or  (date)     Recertification due (POC duration/ or 90 days whichever is less): visit # or  (date)     Visit # Insurance Allowable Auth required? Date Range    Med nec []  Yes  [x]  No      Latex Allergy:  [x]NO      []YES  Preferred Language for Healthcare:   [x]English       []other:    Functional Scale:        Date assessed:  LEFS: raw score = 49; dysfunction = 38.75%  21    Pain level:  0/10     SUBJECTIVE:  Denies pain at current time, states knee feels good this morning. Went to vein specialist yesterday and is starting to wear compression socks for support.      OBJECTIVE:   : Pt with lateral heel wear pattern on R shoe due to heel strike in R LE.       RESTRICTIONS/PRECAUTIONS: hard of hearing    Exercises/Interventions:     Therapeutic Exercise (41181)  Resistance / level Sets/sec Reps Notes / Cues   Bike 2.0 4 min  s=12          IB calf stretch  HR  30\"  2 2  10 Added    HSS sitting on chair       Standing 3 way hip Lime 1 10 B Added  Lateral band walks Lime 3 laps  Added 4/22                        Therapeutic Activities (22290)       Step ups: fwd, lateral 4\" 2 10 L Added 4/22                        Neuromuscular Re-ed (44623)       Airex: NBOS EC 30\" 2 Added 4/22; CGA req at times   NBOS with ball lifts up/down with eye tracking airex 1 15 Added 4/22; CGA req at times   Toe taps on step 4\" 2 10 B Added 4/22                        Manual Intervention (13075)       Knee mobs/PROM       Tib/Fem Mobs       Patella Mobs  2'     Manual stretching: HS, quad  3x30\" L  Quad stretch in prone              Modalities: as needed    Self-care/home management: 4/5: positioning when sitting to reduce tightness and pain in L knee with knee extended with slight bend, stairs leading with L LE when descending to reduce pain with step to pattern, education on pathomechanics of meniscus injury with both surgical and conservative treatment, application of voltaren gel as option for pain relief if approved by pt's PCP and/or pharmacist  X 8 min    Pt. Education:  -pt educated on diagnosis, prognosis and expectations for rehab  -all pt questions were answered    Home Exercise Program:  Access Code: [email or web address]  URL: [email or web address]  Date: 04/05/2021  Prepared by: Madhuri Barrera    Exercises  Seated Table Hamstring Stretch - 3 x daily - 7 x weekly - 1 sets - 2 reps - 30 hold  Supine Quad Set - 1 x daily - 7 x weekly - 1 sets - 10 reps - 10 hold  Supine Bridge - 1 x daily - 7 x weekly - 10 reps - 3 sets  Supine Active Straight Leg Raise - 1 x daily - 7 x weekly - 10 reps - 3 sets  Sidelying Hip Abduction - 1 x daily - 7 x weekly - 10 reps - 3 sets  Seated Long Arc Quad - 1 x daily - 7 x weekly - 10 reps - 3 sets    Patient Education  Meniscus Tear    Therapeutic Exercise and NMR EXR  [x] (50333) Provided verbal/tactile cueing for activities related to strengthening, flexibility, endurance, ROM for improvements in LE, proximal hip, and core control with self care, mobility, lifting, ambulation.  [] (55207) Provided verbal/tactile cueing for activities related to improving balance, coordination, kinesthetic sense, posture, motor skill, proprioception  to assist with LE, proximal hip, and core control in self care, mobility, lifting, ambulation and eccentric single leg control.   [] (88964) Therapist is in constant attendance of 2 or more patients providing skilled therapy interventions, but not providing any significant amount of measurable one-on-one time to either patient, for improvements in LE, proximal hip, and core control in self care, mobility, lifting, ambulation and eccentric single leg control.      NMR and Therapeutic Activities:    [] (80962 or 71135) Provided verbal/tactile cueing for activities related to improving balance, coordination, kinesthetic sense, posture, motor skill, proprioception and motor activation to allow for proper function of core, proximal hip and LE with self care and ADLs  [] (30328) Gait Re-education- Provided training and instruction to the patient for proper LE, core and proximal hip recruitment and positioning and eccentric body weight control with ambulation re-education including up and down stairs     Home Exercise Program:    [x] (67658) Reviewed/Progressed HEP activities related to strengthening, flexibility, endurance, ROM of core, proximal hip and LE for functional self-care, mobility, lifting and ambulation/stair navigation   [] (42906)Reviewed/Progressed HEP activities related to improving balance, coordination, kinesthetic sense, posture, motor skill, proprioception of core, proximal hip and LE for self care, mobility, lifting, and ambulation/stair navigation      Manual Treatments:  PROM / STM / Oscillations-Mobs:  G-I, II, III, IV (PA's, Inf., Post.)  [x] (03022) Provided manual therapy to mobilize LE, proximal hip and/or LS spine soft tissue/joints for the purpose of modulating pain, promoting relaxation,  increasing ROM, reducing/eliminating soft tissue swelling/inflammation/restriction, improving soft tissue extensibility and allowing for proper ROM for normal function with self care, mobility, lifting and ambulation. Modalities:  [] (10904) Vasopneumatic compression: Utilized vasopneumatic compression to decrease edema / swelling for the purpose of improving mobility and quad tone / recruitment which will allow for increased overall function including but not limited to self-care, transfers, ambulation, and ascending / descending stairs. Charges:  Timed Code Treatment Minutes: 40   Total Treatment Minutes: 40     [] EVAL - LOW (98128)   [] EVAL - MOD (34926)  [] EVAL - HIGH (00677)  [] RE-EVAL (11513)  [x] ZB(19220) x   1    [] Ionto  [x] NMR (52960) x  1     [] Vaso  [x] Manual (94486) x  1     [] Ultrasound  [] TA x        [] Mech Traction (57513)  [] Aquatic Therapy x      [] ES (un) (11886):   [] Home Management Training x   [] ES(attended) (64926)   [] Dry Needling 1-2 muscles (51345):  [] Dry Needling 3+ muscles (260533  [] Group:      [] Other:     GOALS:   Patient stated goal: decreased pain when sitting, driving, performing stairs, and squatting  [] Progressing: [] Met: [] Not Met: [] Adjusted    Therapist goals for Patient:   Short Term Goals: To be achieved in: 2 weeks  1. Independent in HEP and progression per patient tolerance, in order to prevent re-injury. [] Progressing: [] Met: [] Not Met: [] Adjusted  2. Patient will have a decrease in pain to facilitate improvement in movement, function, and ADLs as indicated by Functional Deficits. [] Progressing: [] Met: [] Not Met: [] Adjusted    Long Term Goals: To be achieved in: 6 weeks  1. Disability index score of 25% or less for the LEFS to assist with reaching prior level of function. [] Progressing: [] Met: [] Not Met: [] Adjusted  2.  Patient will demonstrate increased AROM to 135 deg L knee flexion to allow for proper joint functioning as indicated by patients Functional Deficits. [] Progressing: [] Met: [] Not Met: [] Adjusted  3. Patient will demonstrate an increase in Strength to at least 5/5 as well as good proximal hip strength and control to allow for proper functional mobility as indicated by patients Functional Deficits. [] Progressing: [] Met: [] Not Met: [] Adjusted  4. Patient will return to functional activities including sitting, driving, performing stairs, and squatting without increased symptoms or restriction. [] Progressing: [] Met: [] Not Met: [] Adjusted  5. Patient will be able to perform SLS > 20 sec B. [] Progressing: [] Met: [] Not Met: [] Adjusted    Overall Progression Towards Functional goals/ Treatment Progress Update:  [] Patient is progressing as expected towards functional goals listed. [] Progression is slowed due to complexities/Impairments listed. [] Progression has been slowed due to co-morbidities. [x] Plan just implemented, too soon to assess goals progression <30days   [] Goals require adjustment due to lack of progress  [] Patient is not progressing as expected and requires additional follow up with physician  [] Other    Persisting Functional Limitations/Impairments:  [x]Sitting []Standing   []Walking [x]Stairs   []Transfers []ADLs   [x]Squatting/bending [x]Kneeling  []Housework []Job related tasks  []Driving []Sports/Recreation   []Sleeping []Other:    ASSESSMENT:  Continued with current exercises, progressions listed in bold. Pt tolerated well, no complaints during treatment. Pt continues to present with dec flexibility in B HS and rec fem, L more limited than R. Tolerated stretches better this date. Continue to progress strength and balance as tolerated.      Treatment/Activity Tolerance:  [x] Pt able to complete treatment [] Patient limited by fatique  [] Patient limited by pain  [] Patient limited by other medical complications  [] Other:     Prognosis:  [x] Good [] Fair  [] Poor    Patient Requires Follow-up: [x] Yes  [] No    PLAN: See eval. PT 2x / week for 6 weeks. [x] Continue per plan of care [] Alter current plan (see comments)  [] Plan of care initiated [] Hold pending MD visit [] Discharge    Electronically signed by: Angelita Stahl PT, DPT - initiated treatment this date  [facility], PT, DPT, STMT-1  License #[id number]        Note: If patient does not return for scheduled/ recommended follow up visits, this note will serve as a discharge from care along with most recent update on progress.

## 2021-05-04 ENCOUNTER — HOSPITAL ENCOUNTER (OUTPATIENT)
Dept: PHYSICAL THERAPY | Age: 81
Setting detail: THERAPIES SERIES
Discharge: HOME | End: 2021-05-04
Payer: MEDICARE

## 2021-05-04 PROCEDURE — 97112 NEUROMUSCULAR REEDUCATION: CPT

## 2021-05-04 PROCEDURE — 97140 MANUAL THERAPY 1/> REGIONS: CPT

## 2021-05-04 PROCEDURE — 97110 THERAPEUTIC EXERCISES: CPT

## 2021-05-04 NOTE — FLOWSHEET NOTE
Eliezer Gao  Phone: [phone]   Fax: [phone]    Physical Therapy Treatment Note/ Progress Report:     Date:  2021    Patient Name:  Karson Powers    :  1940  MRN: [id number]  Restrictions/Precautions:    Medical/Treatment Diagnosis Information:  Diagnosis: M25.562, G89.29 (ICD-10-CM) - Chronic pain of left knee  Treatment Diagnosis: dec L knee flexion ROM, (+) meniscus testing, impaired balance, dec B HS, quad, and calf flexibility  Insurance/Certification information:  PT Insurance Information: Medicare & AARP  Physician Information:  Referring Practitioner: JESSICA Hood CNP  Plan of care signed (Y/N): [x]  Yes [x]  No     Date of Patient follow up with Physician: 6 months     Progress Report: []  Yes  [x]  No     Date Range for reporting period:  Beginnin/5   Ending:     Progress report due (10 Rx/or 30 days whichever is less): visit #10 or  (date)     Recertification due (POC duration/ or 90 days whichever is less): visit #12 or  (date)     Visit # Insurance Allowable Auth required? Date Range    Med nec []  Yes  [x]  No      Latex Allergy:  [x]NO      []YES  Preferred Language for Healthcare:   [x]English       []other:    Functional Scale:        Date assessed:  LEFS: raw score = 49; dysfunction = 38.75%  21    Pain level:  4/10     SUBJECTIVE:    Pt reports they are feeling good. They have nothing new to report. Pt reports the compression socks have been fine.       OBJECTIVE:   : Pt with lateral heel wear pattern on R shoe due to heel strike in R LE.       RESTRICTIONS/PRECAUTIONS: hard of hearing    Exercises/Interventions:     Therapeutic Exercise (00470)  Resistance / level Sets/sec Reps Notes / Cues   Bike 2.0 4 min  s=12          IB calf stretch  HR  30\"  2 2  10 Added    HSS sitting on chair       Standing 3 way hip Lime 1 10 B Added    Lateral band walks Lime 3 laps  Added 4/22                        Therapeutic Activities (35970)       Step ups: fwd, lateral 4\" 2 10 L Added 4/22                        Neuromuscular Re-ed (51618)       Airex: NBOS EC, CGA 30\" 2 Added 4/22; CGA req at times   NBOS with ball lifts up/down with eye tracking Airex, CGA 1 15 Added 4/22; CGA req at times   Toe taps on step 4\" 2 10 B Added 4/22                        Manual Intervention (89189)       Knee mobs/PROM       Tib/Fem Mobs       Patella Mobs  2'     Manual stretching: HS, quad  3x30\" L  Quad stretch in prone              Modalities: as needed    Self-care/home management: 4/5: positioning when sitting to reduce tightness and pain in L knee with knee extended with slight bend, stairs leading with L LE when descending to reduce pain with step to pattern, education on pathomechanics of meniscus injury with both surgical and conservative treatment, application of voltaren gel as option for pain relief if approved by pt's PCP and/or pharmacist  X 8 min    Pt. Education:  -pt educated on diagnosis, prognosis and expectations for rehab  -all pt questions were answered    Home Exercise Program:  Access Code: [email or web address]  URL: [email or web address]  Date: 04/05/2021  Prepared by: Frida Barrera    Exercises  Seated Table Hamstring Stretch - 3 x daily - 7 x weekly - 1 sets - 2 reps - 30 hold  Supine Quad Set - 1 x daily - 7 x weekly - 1 sets - 10 reps - 10 hold  Supine Bridge - 1 x daily - 7 x weekly - 10 reps - 3 sets  Supine Active Straight Leg Raise - 1 x daily - 7 x weekly - 10 reps - 3 sets  Sidelying Hip Abduction - 1 x daily - 7 x weekly - 10 reps - 3 sets  Seated Long Arc Quad - 1 x daily - 7 x weekly - 10 reps - 3 sets    Patient Education  Meniscus Tear    Therapeutic Exercise and NMR EXR  [x] (81080) Provided verbal/tactile cueing for activities related to strengthening, flexibility, endurance, ROM for improvements in LE, proximal hip, and core control with self care, mobility, lifting, ambulation.  [] (77968) Provided verbal/tactile cueing for activities related to improving balance, coordination, kinesthetic sense, posture, motor skill, proprioception  to assist with LE, proximal hip, and core control in self care, mobility, lifting, ambulation and eccentric single leg control.   [] (83526) Therapist is in constant attendance of 2 or more patients providing skilled therapy interventions, but not providing any significant amount of measurable one-on-one time to either patient, for improvements in LE, proximal hip, and core control in self care, mobility, lifting, ambulation and eccentric single leg control.      NMR and Therapeutic Activities:    [] (65886 or 59855) Provided verbal/tactile cueing for activities related to improving balance, coordination, kinesthetic sense, posture, motor skill, proprioception and motor activation to allow for proper function of core, proximal hip and LE with self care and ADLs  [] (25852) Gait Re-education- Provided training and instruction to the patient for proper LE, core and proximal hip recruitment and positioning and eccentric body weight control with ambulation re-education including up and down stairs     Home Exercise Program:    [x] (56263) Reviewed/Progressed HEP activities related to strengthening, flexibility, endurance, ROM of core, proximal hip and LE for functional self-care, mobility, lifting and ambulation/stair navigation   [] (58213)Reviewed/Progressed HEP activities related to improving balance, coordination, kinesthetic sense, posture, motor skill, proprioception of core, proximal hip and LE for self care, mobility, lifting, and ambulation/stair navigation      Manual Treatments:  PROM / STM / Oscillations-Mobs:  G-I, II, III, IV (PA's, Inf., Post.)  [x] (20639) Provided manual therapy to mobilize LE, proximal hip and/or LS spine soft tissue/joints for the purpose of modulating pain, promoting relaxation,  increasing ROM, reducing/eliminating Deficits. [] Progressing: [] Met: [] Not Met: [] Adjusted  3. Patient will demonstrate an increase in Strength to at least 5/5 as well as good proximal hip strength and control to allow for proper functional mobility as indicated by patients Functional Deficits. [] Progressing: [] Met: [] Not Met: [] Adjusted  4. Patient will return to functional activities including sitting, driving, performing stairs, and squatting without increased symptoms or restriction. [] Progressing: [] Met: [] Not Met: [] Adjusted  5. Patient will be able to perform SLS > 20 sec B. [] Progressing: [] Met: [] Not Met: [] Adjusted    Overall Progression Towards Functional goals/ Treatment Progress Update:  [] Patient is progressing as expected towards functional goals listed. [] Progression is slowed due to complexities/Impairments listed. [] Progression has been slowed due to co-morbidities. [x] Plan just implemented, too soon to assess goals progression <30days   [] Goals require adjustment due to lack of progress  [] Patient is not progressing as expected and requires additional follow up with physician  [] Other    Persisting Functional Limitations/Impairments:  [x]Sitting []Standing   []Walking [x]Stairs   []Transfers []ADLs   [x]Squatting/bending [x]Kneeling  []Housework []Job related tasks  []Driving []Sports/Recreation   []Sleeping []Other:    ASSESSMENT:  Pt continued with treatment and reported no new difficulties with the exercises given. Pt reported they felt fine before treatment and reported no exacerbations after the session. Pt has decreased balance d/t past ankle problems, tends to lose balance posteriorly when on airex. Will continue to progress as tolerable.         Treatment/Activity Tolerance:  [x] Pt able to complete treatment [] Patient limited by fatique  [] Patient limited by pain  [] Patient limited by other medical complications  [] Other:     Prognosis:  [x] Good [] Fair  [] Poor    Patient Requires Follow-up: [x] Yes  [] No    PLAN: See eval. PT 2x / week for 6 weeks. [x] Continue per plan of care [] Alter current plan (see comments)  [] Plan of care initiated [] Hold pending MD visit [] Discharge    Electronically signed by: Virginia Aguilar PT, DPT          Note: If patient does not return for scheduled/ recommended follow up visits, this note will serve as a discharge from care along with most recent update on progress.

## 2021-05-06 ENCOUNTER — HOSPITAL ENCOUNTER (OUTPATIENT)
Dept: PHYSICAL THERAPY | Age: 81
Setting detail: THERAPIES SERIES
Discharge: HOME | End: 2021-05-06
Payer: MEDICARE

## 2021-05-06 PROCEDURE — 97140 MANUAL THERAPY 1/> REGIONS: CPT

## 2021-05-06 PROCEDURE — 97112 NEUROMUSCULAR REEDUCATION: CPT

## 2021-05-06 PROCEDURE — 97110 THERAPEUTIC EXERCISES: CPT

## 2021-05-06 NOTE — FLOWSHEET NOTE
[facility]  Phone: [phone]   Fax: [phone]    Physical Therapy Treatment Note/ Progress Report:     Date:  2021    Patient Name:  Deion Bianchi    :  1940  MRN: [id number]  Restrictions/Precautions:    Medical/Treatment Diagnosis Information:  Diagnosis: M25.562, G89.29 (ICD-10-CM) - Chronic pain of left knee  Treatment Diagnosis: dec L knee flexion ROM, (+) meniscus testing, impaired balance, dec B HS, quad, and calf flexibility  Insurance/Certification information:  PT Insurance Information: Medicare & AARP  Physician Information:  Referring Practitioner: JESSICA Salazar CNP  Plan of care signed (Y/N): [x]  Yes [x]  No     Date of Patient follow up with Physician: 6 months     Progress Report: []  Yes  [x]  No     Date Range for reporting period:  Beginnin/5   Ending:     Progress report due (10 Rx/or 30 days whichever is less): visit #10 or  (date)     Recertification due (POC duration/ or 90 days whichever is less): visit # or  (date)     Visit # Insurance Allowable Auth required? Date Range    Med nec []  Yes  [x]  No      Latex Allergy:  [x]NO      []YES  Preferred Language for Healthcare:   [x]English       []other:    Functional Scale:        Date assessed:  LEFS: raw score = 49; dysfunction = 38.75%  21    Pain level:  5/10     SUBJECTIVE:    Pt reports that since last session he has been having medial joint line pain. This is not typical for him.      OBJECTIVE:   : Pt with lateral heel wear pattern on R shoe due to heel strike in R LE.       RESTRICTIONS/PRECAUTIONS: hard of hearing    Exercises/Interventions:     Therapeutic Exercise (57992)  Resistance / level Sets/sec Reps Notes / Cues   Bike 2.0 4 min  s=12          IB calf stretch  HR  30\"  2 2  10 Added    HSS sitting on chair  2 30\"    Standing 3 way hip Lime 1 10 B Added    Lateral band walks Lime 3 laps  Added  Therapeutic Activities (42636)       Step ups: fwd, lateral 4\" 2 10 L Added 4/22                        Neuromuscular Re-ed (77375)       Airex: NBOS Added 4/22; CGA req at times   NBOS with ball lifts up/down with eye tracking Added 4/22; CGA req at times   Toe taps on step 4\" 2 10 B Added 4/22   Cone taps   10 B    Lm step overs Fwd/la  10 B           Manual Intervention (65028)       Knee mobs/PROM       Tib/Fem Mobs       Patella Mobs       Manual stretching: HS, quad  4x30\" B  Quad stretch in prone   Roller for HS, calves  x8'         Modalities: as needed    Self-care/home management: 4/5: positioning when sitting to reduce tightness and pain in L knee with knee extended with slight bend, stairs leading with L LE when descending to reduce pain with step to pattern, education on pathomechanics of meniscus injury with both surgical and conservative treatment, application of voltaren gel as option for pain relief if approved by pt's PCP and/or pharmacist  X 8 min    Pt. Education:  -pt educated on diagnosis, prognosis and expectations for rehab  -all pt questions were answered    Home Exercise Program:  Access Code: [email or web address]  URL: [email or web address]  Date: 04/05/2021  Prepared by: Soni Barrera    Exercises  Seated Table Hamstring Stretch - 3 x daily - 7 x weekly - 1 sets - 2 reps - 30 hold  Supine Quad Set - 1 x daily - 7 x weekly - 1 sets - 10 reps - 10 hold  Supine Bridge - 1 x daily - 7 x weekly - 10 reps - 3 sets  Supine Active Straight Leg Raise - 1 x daily - 7 x weekly - 10 reps - 3 sets  Sidelying Hip Abduction - 1 x daily - 7 x weekly - 10 reps - 3 sets  Seated Long Arc Quad - 1 x daily - 7 x weekly - 10 reps - 3 sets    Patient Education  Meniscus Tear    Therapeutic Exercise and NMR EXR  [x] (44276) Provided verbal/tactile cueing for activities related to strengthening, flexibility, endurance, ROM for improvements in LE, proximal hip, and core control with self care, reducing/eliminating soft tissue swelling/inflammation/restriction, improving soft tissue extensibility and allowing for proper ROM for normal function with self care, mobility, lifting and ambulation. Modalities:  [] (12388) Vasopneumatic compression: Utilized vasopneumatic compression to decrease edema / swelling for the purpose of improving mobility and quad tone / recruitment which will allow for increased overall function including but not limited to self-care, transfers, ambulation, and ascending / descending stairs. Charges:  Timed Code Treatment Minutes: 45   Total Treatment Minutes: 45     [] EVAL - LOW (93307)   [] EVAL - MOD (22014)  [] EVAL - HIGH (41275)  [] RE-EVAL (78572)  [x] MG(72901) x   1    [] Ionto  [x] NMR (27496) x  1     [] Vaso  [x] Manual (64203) x  1     [] Ultrasound  [] TA x        [] Mech Traction (83175)  [] Aquatic Therapy x      [] ES (un) (15989):   [] Home Management Training x   [] ES(attended) (74192)   [] Dry Needling 1-2 muscles (21509):  [] Dry Needling 3+ muscles (009806  [] Group:      [] Other:     GOALS:   Patient stated goal: decreased pain when sitting, driving, performing stairs, and squatting  [] Progressing: [] Met: [] Not Met: [] Adjusted    Therapist goals for Patient:   Short Term Goals: To be achieved in: 2 weeks  1. Independent in HEP and progression per patient tolerance, in order to prevent re-injury. [] Progressing: [] Met: [] Not Met: [] Adjusted  2. Patient will have a decrease in pain to facilitate improvement in movement, function, and ADLs as indicated by Functional Deficits. [] Progressing: [] Met: [] Not Met: [] Adjusted    Long Term Goals: To be achieved in: 6 weeks  1. Disability index score of 25% or less for the LEFS to assist with reaching prior level of function. [] Progressing: [] Met: [] Not Met: [] Adjusted  2.  Patient will demonstrate increased AROM to 135 deg L knee flexion to allow for proper joint functioning as indicated by patients Functional Deficits. [] Progressing: [] Met: [] Not Met: [] Adjusted  3. Patient will demonstrate an increase in Strength to at least 5/5 as well as good proximal hip strength and control to allow for proper functional mobility as indicated by patients Functional Deficits. [] Progressing: [] Met: [] Not Met: [] Adjusted  4. Patient will return to functional activities including sitting, driving, performing stairs, and squatting without increased symptoms or restriction. [] Progressing: [] Met: [] Not Met: [] Adjusted  5. Patient will be able to perform SLS > 20 sec B. [] Progressing: [] Met: [] Not Met: [] Adjusted    Overall Progression Towards Functional goals/ Treatment Progress Update:  [] Patient is progressing as expected towards functional goals listed. [] Progression is slowed due to complexities/Impairments listed. [] Progression has been slowed due to co-morbidities. [x] Plan just implemented, too soon to assess goals progression <30days   [] Goals require adjustment due to lack of progress  [] Patient is not progressing as expected and requires additional follow up with physician  [] Other    Persisting Functional Limitations/Impairments:  [x]Sitting []Standing   []Walking [x]Stairs   []Transfers []ADLs   [x]Squatting/bending [x]Kneeling  []Housework []Job related tasks  []Driving []Sports/Recreation   []Sleeping []Other:    ASSESSMENT:  Pt report medial joint line pain after last session. Focused session on improving stability with balance exercises and held airex this date to decreased instability and improve tolerance to therapy. Also added rolling in the HS instead of manual HS stretching as self HS stretching taught during there-ex. Will continue to monitor pt for medial joint line pain NV. PN NV.         Treatment/Activity Tolerance:  [x] Pt able to complete treatment [] Patient limited by fatique  [] Patient limited by pain  [] Patient limited by other medical complications  [] Other:     Prognosis:  [x] Good [] Fair  [] Poor    Patient Requires Follow-up: [x] Yes  [] No    PLAN: See eval. PT 2x / week for 6 weeks. [x] Continue per plan of care [] Alter current plan (see comments)  [] Plan of care initiated [] Hold pending MD visit [] Discharge    Electronically signed by: Yassine Davila PT, DPT          Note: If patient does not return for scheduled/ recommended follow up visits, this note will serve as a discharge from care along with most recent update on progress.

## 2021-05-07 DIAGNOSIS — E78.00 HYPERCHOLESTEREMIA: ICD-10-CM

## 2021-05-07 RX ORDER — SIMVASTATIN 20 MG
TABLET ORAL
Qty: 30 TABLET | Refills: 0 | Status: SHIPPED | OUTPATIENT
Start: 2021-05-07 | End: 2021-05-13 | Stop reason: SDUPTHER

## 2021-05-07 NOTE — TELEPHONE ENCOUNTER
He is out - please call in 1 weeks worth til mail order come in    [facility] [address], [address] [place] [phone]    Pt @  [phone]

## 2021-05-12 ENCOUNTER — APPOINTMENT (OUTPATIENT)
Dept: PHYSICAL THERAPY | Age: 81
End: 2021-05-12
Payer: MEDICARE

## 2021-05-12 ENCOUNTER — HOSPITAL ENCOUNTER (OUTPATIENT)
Dept: PHYSICAL THERAPY | Age: 81
Setting detail: THERAPIES SERIES
Discharge: HOME | End: 2021-05-12
Payer: MEDICARE

## 2021-05-12 PROCEDURE — 97110 THERAPEUTIC EXERCISES: CPT | Performed by: SPECIALIST/TECHNOLOGIST

## 2021-05-12 PROCEDURE — 97140 MANUAL THERAPY 1/> REGIONS: CPT | Performed by: SPECIALIST/TECHNOLOGIST

## 2021-05-12 PROCEDURE — 97112 NEUROMUSCULAR REEDUCATION: CPT | Performed by: SPECIALIST/TECHNOLOGIST

## 2021-05-12 NOTE — FLOWSHEET NOTE
[facility]  Phone: [phone]   Fax: [phone]    Physical Therapy Treatment Note/ Progress Report:     Date:  2021    Patient Name:  Elizabeth Garay    :  1940  MRN: [id number]  Restrictions/Precautions:    Medical/Treatment Diagnosis Information:  Diagnosis: M25.562, G89.29 (ICD-10-CM) - Chronic pain of left knee  Treatment Diagnosis: dec L knee flexion ROM, (+) meniscus testing, impaired balance, dec B HS, quad, and calf flexibility  Insurance/Certification information:  PT Insurance Information: Medicare & AARP  Physician Information:  Referring Practitioner: JESSICA Marrero CNP  Plan of care signed (Y/N): [x]  Yes [x]  No     Date of Patient follow up with Physician: 6 months       Progress Report: []  Yes  [x]  No     Date Range for reporting period:  Beginnin/5   Ending:     Progress report due (10 Rx/or 30 days whichever is less): visit #10 or  (date)     Recertification due (POC duration/ or 90 days whichever is less): visit #12 or  (date)     Visit # Insurance Allowable Auth required? Date Range    Med nec []  Yes  [x]  No      Latex Allergy:  [x]NO      []YES  Preferred Language for Healthcare:   [x]English       []other:    Functional Scale:        Date assessed:  LEFS: raw score = 49; dysfunction = 38.75%  21    Pain level:  5/10     SUBJECTIVE:    Pt reports having some left leg cramping today but knee has been ok.  Pt reports having same discomfort across the inside the medial knee with walking etc.     OBJECTIVE:   : Pt with lateral heel wear pattern on R shoe due to heel strike in R LE.       RESTRICTIONS/PRECAUTIONS: hard of hearing/ Rt ankle history OA    Exercises/Interventions:   Therapeutic Exercise (03246)  Resistance / level Sets/sec Reps Notes / Cues   Bike 2.0 5 min  s=12   Supine slr flexion/ Abd  B 2# 3 10x    IB calf stretch  HR  30\"  2 2  10 Added    HSS sitting and NMR EXR  [x] (28078) Provided verbal/tactile cueing for activities related to strengthening, flexibility, endurance, ROM for improvements in LE, proximal hip, and core control with self care, mobility, lifting, ambulation.  [] (05868) Provided verbal/tactile cueing for activities related to improving balance, coordination, kinesthetic sense, posture, motor skill, proprioception  to assist with LE, proximal hip, and core control in self care, mobility, lifting, ambulation and eccentric single leg control.   [] (15887) Therapist is in constant attendance of 2 or more patients providing skilled therapy interventions, but not providing any significant amount of measurable one-on-one time to either patient, for improvements in LE, proximal hip, and core control in self care, mobility, lifting, ambulation and eccentric single leg control.      NMR and Therapeutic Activities:    [] (76679 or 96289) Provided verbal/tactile cueing for activities related to improving balance, coordination, kinesthetic sense, posture, motor skill, proprioception and motor activation to allow for proper function of core, proximal hip and LE with self care and ADLs  [] (68096) Gait Re-education- Provided training and instruction to the patient for proper LE, core and proximal hip recruitment and positioning and eccentric body weight control with ambulation re-education including up and down stairs     Home Exercise Program:    [x] (11834) Reviewed/Progressed HEP activities related to strengthening, flexibility, endurance, ROM of core, proximal hip and LE for functional self-care, mobility, lifting and ambulation/stair navigation   [] (97028)Reviewed/Progressed HEP activities related to improving balance, coordination, kinesthetic sense, posture, motor skill, proprioception of core, proximal hip and LE for self care, mobility, lifting, and ambulation/stair navigation      Manual Treatments:  PROM / STM / Oscillations-Mobs:  G-I, II, III, IV (PA's, Inf., Post.)  [x] (96040) Provided manual therapy to mobilize LE, proximal hip and/or LS spine soft tissue/joints for the purpose of modulating pain, promoting relaxation,  increasing ROM, reducing/eliminating soft tissue swelling/inflammation/restriction, improving soft tissue extensibility and allowing for proper ROM for normal function with self care, mobility, lifting and ambulation. Modalities:  [] (59236) Vasopneumatic compression: Utilized vasopneumatic compression to decrease edema / swelling for the purpose of improving mobility and quad tone / recruitment which will allow for increased overall function including but not limited to self-care, transfers, ambulation, and ascending / descending stairs. Charges:  Timed Code Treatment Minutes: 45   Total Treatment Minutes: 45     [] EVAL - LOW (54426)   [] EVAL - MOD (30668)  [] EVAL - HIGH (55316)  [] RE-EVAL (14388)  [x] JM(10702) x   1    [] Ionto  [x] NMR (02666) x  1     [] Vaso  [x] Manual (05855) x  1     [] Ultrasound  [] TA x        [] Mech Traction (33345)  [] Aquatic Therapy x      [] ES (un) (00572):   [] Home Management Training x   [] ES(attended) (97868)   [] Dry Needling 1-2 muscles (81271):  [] Dry Needling 3+ muscles (802191  [] Group:      [] Other:     GOALS:   Patient stated goal: decreased pain when sitting, driving, performing stairs, and squatting  [] Progressing: [] Met: [] Not Met: [] Adjusted    Therapist goals for Patient:   Short Term Goals: To be achieved in: 2 weeks  1. Independent in HEP and progression per patient tolerance, in order to prevent re-injury. [] Progressing: [] Met: [] Not Met: [] Adjusted  2. Patient will have a decrease in pain to facilitate improvement in movement, function, and ADLs as indicated by Functional Deficits. [] Progressing: [] Met: [] Not Met: [] Adjusted    Long Term Goals: To be achieved in: 6 weeks  1.  Disability index score of 25% or less for the LEFS to assist with reaching prior level of function. [] Progressing: [] Met: [] Not Met: [] Adjusted  2. Patient will demonstrate increased AROM to 135 deg L knee flexion to allow for proper joint functioning as indicated by patients Functional Deficits. [] Progressing: [] Met: [] Not Met: [] Adjusted  3. Patient will demonstrate an increase in Strength to at least 5/5 as well as good proximal hip strength and control to allow for proper functional mobility as indicated by patients Functional Deficits. [] Progressing: [] Met: [] Not Met: [] Adjusted  4. Patient will return to functional activities including sitting, driving, performing stairs, and squatting without increased symptoms or restriction. [] Progressing: [] Met: [] Not Met: [] Adjusted  5. Patient will be able to perform SLS > 20 sec B. [] Progressing: [] Met: [] Not Met: [] Adjusted    Overall Progression Towards Functional goals/ Treatment Progress Update:  [] Patient is progressing as expected towards functional goals listed. [] Progression is slowed due to complexities/Impairments listed. [] Progression has been slowed due to co-morbidities. [x] Plan just implemented, too soon to assess goals progression <30days   [] Goals require adjustment due to lack of progress  [] Patient is not progressing as expected and requires additional follow up with physician  [] Other    Persisting Functional Limitations/Impairments:  [x]Sitting []Standing   []Walking [x]Stairs   []Transfers []ADLs   [x]Squatting/bending [x]Kneeling  []Housework []Job related tasks  []Driving []Sports/Recreation   []Sleeping []Other:    ASSESSMENT:   Pt reports having medial knee pain with lateral stepping today but not consistently thru any exercises. Pt denied pain with progressions with addition of wts/ LP/ isometrics and progression of resistive bands. Pt had some crepitus with seated leg isometrics especially on the Rt knee. Encouraged pt to use submax. effort with isometrics as a result.   Gait pattern altered with Rt ankle history/ OA. Pt feels like he's making improvements in strength and flexibility and getting stronger but still has some improvements to make with balance/ endurance. Treatment/Activity Tolerance:  [x] Pt able to complete treatment [] Patient limited by fatique  [] Patient limited by pain  [] Patient limited by other medical complications  [] Other:     Prognosis:  [x] Good [] Fair  [] Poor    Patient Requires Follow-up: [x] Yes  [] No    PLAN: PT 1-2x / week for 3 next weeks. [x] Continue per plan of care [] Alter current plan (see comments)  [] Plan of care initiated [] Hold pending MD visit [] Discharge    Electronically signed by: Rehan Garrison PTA, [id number]          Note: If patient does not return for scheduled/ recommended follow up visits, this note will serve as a discharge from care along with most recent update on progress.

## 2021-05-13 DIAGNOSIS — E78.00 HYPERCHOLESTEREMIA: ICD-10-CM

## 2021-05-13 RX ORDER — SIMVASTATIN 20 MG
TABLET ORAL
Qty: 90 TABLET | Refills: 0 | Status: SHIPPED | OUTPATIENT
Start: 2021-05-13 | End: 2021-07-29

## 2021-05-18 ENCOUNTER — HOSPITAL ENCOUNTER (OUTPATIENT)
Dept: PHYSICAL THERAPY | Age: 81
Setting detail: THERAPIES SERIES
Discharge: HOME | End: 2021-05-18
Payer: MEDICARE

## 2021-05-18 PROCEDURE — 97112 NEUROMUSCULAR REEDUCATION: CPT

## 2021-05-18 PROCEDURE — 97140 MANUAL THERAPY 1/> REGIONS: CPT

## 2021-05-18 PROCEDURE — 97110 THERAPEUTIC EXERCISES: CPT

## 2021-05-18 NOTE — FLOWSHEET NOTE
Eliezer Gao  Phone: [phone]   Fax: [phone]    Physical Therapy Treatment Note/ Progress Report:     Date:  2021    Patient Name:  Shane Baltazar    :  1940  MRN: [id number]  Restrictions/Precautions:    Medical/Treatment Diagnosis Information:  Diagnosis: M25.562, G89.29 (ICD-10-CM) - Chronic pain of left knee  Treatment Diagnosis: dec L knee flexion ROM, (+) meniscus testing, impaired balance, dec B HS, quad, and calf flexibility  Insurance/Certification information:  PT Insurance Information: Medicare & AARP  Physician Information:  Referring Practitioner: JESSICA Serna CNP  Plan of care signed (Y/N): [x]  Yes [x]  No     Date of Patient follow up with Physician: 6 months       Progress Report: []  Yes  [x]  No     Date Range for reporting period:  Beginnin/5   Ending:     Progress report due (10 Rx/or 30 days whichever is less): visit #10 or  (date)     Recertification due (POC duration/ or 90 days whichever is less): visit # or  (date)     Visit # Insurance Allowable Auth required? Date Range    Med nec   Yes  [x]  No      Latex Allergy:  [x]NO      []YES  Preferred Language for Healthcare:   [x]English       []other:    Functional Scale:        Date assessed:  LEFS: raw score = 49; dysfunction = 38.75%  21    Pain level:  5/10     SUBJECTIVE:    Pt reports he is feeling okay. L leg swelled up after last session. Pt states he had a hard time bending his leg when going to bed, L knee may be popping more. Pt reports no tightness except for going to bed at night. The swelling and tightness lasted for a couple days after last session.      OBJECTIVE:   : Pt with lateral heel wear pattern on R shoe due to heel strike in R LE.       RESTRICTIONS/PRECAUTIONS: hard of hearing/ Rt ankle history OA    Exercises/Interventions:   Therapeutic Exercise (50998)  Resistance / level Sets/sec Reps Notes / Cues   Bike 2.0 5 min  s=12   Supine slr flexion/ Abd  B 2# 2 10x 05/18 Changed to 2 sets from 3 due to increased knee popping and having tightness after last session that lasted for a couple days   IB calf stretch  HR  30\"  2 2  10 Added 4/22   HSS sitting on chair  2 30\"    Standing 3 way hip blue 1 10 B ^   5/12   Lateral band walks blue 3 laps  ^   5/12   Prone quad stretch   30\" 3x B Added 5/12   LP B 0/ 90 Added 5/12   Isometrics seated F    Therapeutic Activities (41292)       Step ups: fwd, lateral w/ HHA 4\" 2 10 L Added 4/22                        Neuromuscular Re-ed (81321)       Airex: NBOS Added 4/22; CGA req at times   NBOS with ball lifts up/down with eye tracking Added 4/22; CGA req at times   Toe taps on step 4\" 2 10 B Added 4/22   Cone taps   10 B    Lm step overs Fwd/lat  10 B           Manual Intervention (30498)       Knee mobs/PROM       Tib/Fem Mobs       Patella Mobs       Manual stretching: HS,   3x30\" B  Quad stretch in prone   Roller for HS, calves  x8'         Modalities: as needed    Self-care/home management: 4/5: positioning when sitting to reduce tightness and pain in L knee with knee extended with slight bend, stairs leading with L LE when descending to reduce pain with step to pattern, education on pathomechanics of meniscus injury with both surgical and conservative treatment, application of voltaren gel as option for pain relief if approved by pt's PCP and/or pharmacist  X 8 min    Pt. Education:  -pt educated on diagnosis, prognosis and expectations for rehab  -all pt questions were answered    Home Exercise Program:  Access Code: [email or web address]  URL: [email or web address]  Date: 04/05/2021  Prepared by: Shahram Barrera    Exercises  Seated Table Hamstring Stretch - 3 x daily - 7 x weekly - 1 sets - 2 reps - 30 hold  Supine Quad Set - 1 x daily - 7 x weekly - 1 sets - 10 reps - 10 hold  Supine Bridge - 1 x daily - 7 x weekly - 10 reps - 3 sets  Supine Active Straight Leg Raise - 1 x daily - 7 x weekly - 10 reps - 3 sets  Sidelying Hip Abduction - 1 x daily - 7 x weekly - 10 reps - 3 sets  Seated Long Arc Quad - 1 x daily - 7 x weekly - 10 reps - 3 sets    Patient Education  Meniscus Tear    Therapeutic Exercise and NMR EXR  [x] (92438) Provided verbal/tactile cueing for activities related to strengthening, flexibility, endurance, ROM for improvements in LE, proximal hip, and core control with self care, mobility, lifting, ambulation.  [] (96452) Provided verbal/tactile cueing for activities related to improving balance, coordination, kinesthetic sense, posture, motor skill, proprioception  to assist with LE, proximal hip, and core control in self care, mobility, lifting, ambulation and eccentric single leg control.   [] (80764) Therapist is in constant attendance of 2 or more patients providing skilled therapy interventions, but not providing any significant amount of measurable one-on-one time to either patient, for improvements in LE, proximal hip, and core control in self care, mobility, lifting, ambulation and eccentric single leg control.      NMR and Therapeutic Activities:    [] (58217 or 62447) Provided verbal/tactile cueing for activities related to improving balance, coordination, kinesthetic sense, posture, motor skill, proprioception and motor activation to allow for proper function of core, proximal hip and LE with self care and ADLs  [] (68925) Gait Re-education- Provided training and instruction to the patient for proper LE, core and proximal hip recruitment and positioning and eccentric body weight control with ambulation re-education including up and down stairs     Home Exercise Program:    [x] (79128) Reviewed/Progressed HEP activities related to strengthening, flexibility, endurance, ROM of core, proximal hip and LE for functional self-care, mobility, lifting and ambulation/stair navigation   [] (05654)Reviewed/Progressed HEP activities related to improving improvement in movement, function, and ADLs as indicated by Functional Deficits. [] Progressing: [] Met: [] Not Met: [] Adjusted    Long Term Goals: To be achieved in: 6 weeks  1. Disability index score of 25% or less for the LEFS to assist with reaching prior level of function. [] Progressing: [] Met: [] Not Met: [] Adjusted  2. Patient will demonstrate increased AROM to 135 deg L knee flexion to allow for proper joint functioning as indicated by patients Functional Deficits. [] Progressing: [] Met: [] Not Met: [] Adjusted  3. Patient will demonstrate an increase in Strength to at least 5/5 as well as good proximal hip strength and control to allow for proper functional mobility as indicated by patients Functional Deficits. [] Progressing: [] Met: [] Not Met: [] Adjusted  4. Patient will return to functional activities including sitting, driving, performing stairs, and squatting without increased symptoms or restriction. [] Progressing: [] Met: [] Not Met: [] Adjusted  5. Patient will be able to perform SLS > 20 sec B. [] Progressing: [] Met: [] Not Met: [] Adjusted    Overall Progression Towards Functional goals/ Treatment Progress Update:  [] Patient is progressing as expected towards functional goals listed. [] Progression is slowed due to complexities/Impairments listed. [] Progression has been slowed due to co-morbidities.   [x] Plan just implemented, too soon to assess goals progression <30days   [] Goals require adjustment due to lack of progress  [] Patient is not progressing as expected and requires additional follow up with physician  [] Other    Persisting Functional Limitations/Impairments:  [x]Sitting []Standing   []Walking [x]Stairs   []Transfers []ADLs   [x]Squatting/bending [x]Kneeling  []Housework []Job related tasks  []Driving []Sports/Recreation   []Sleeping []Other:    ASSESSMENT:   Pt reported swelling and tightness in their L knee that lasted for a couple days after their last

## 2021-05-21 ENCOUNTER — HOSPITAL ENCOUNTER (OUTPATIENT)
Dept: PHYSICAL THERAPY | Age: 81
Setting detail: THERAPIES SERIES
Discharge: HOME | End: 2021-05-21
Payer: MEDICARE

## 2021-05-21 PROCEDURE — 97140 MANUAL THERAPY 1/> REGIONS: CPT

## 2021-05-21 PROCEDURE — 97530 THERAPEUTIC ACTIVITIES: CPT

## 2021-05-21 NOTE — FLOWSHEET NOTE
Eliezer Gao  Phone: [phone]   Fax: [phone]    Physical Therapy Treatment Note/ Progress Report:     Date:  2021    Patient Name:  Tiff Thrasher    :  1940  MRN: [id number]  Restrictions/Precautions:    Medical/Treatment Diagnosis Information:  Diagnosis: M25.562, G89.29 (ICD-10-CM) - Chronic pain of left knee  Treatment Diagnosis: dec L knee flexion ROM, (+) meniscus testing, impaired balance, dec B HS, quad, and calf flexibility  Insurance/Certification information:  PT Insurance Information: Medicare & AARP  Physician Information:  Referring Practitioner: JESSICA Ragland CNP  Plan of care signed (Y/N): [x]  Yes [x]  No     Date of Patient follow up with Physician: 6 months       Progress Report: [x]  Yes  []  No     Date Range for reporting period:  Beginnin/5   Endin/21    Progress report due (10 Rx/or 30 days whichever is less): visit #10 or  (date)     Recertification due (POC duration/ or 90 days whichever is less): visit #12 or  (date)     Visit # Insurance Allowable Auth required? Date Range    Med nec   Yes  [x]  No      Latex Allergy:  [x]NO      []YES  Preferred Language for Healthcare:   [x]English       []other:    Functional Scale:        Date assessed:  LEFS: raw score = 49; dysfunction = 38.75%  21    Pain level:  5/10     SUBJECTIVE:    Pt reports that he is feeling okay. Feels that therapy is helping.       OBJECTIVE:   : Pt with lateral heel wear pattern on R shoe due to heel strike in R LE.      :      PROM AROM     L R L R   Knee Flexion  130   120    Knee Extension               Strength (0-5) / Myotomes Left Right   Hip Flexion - supine       Hip Flexion - seated (L1-2)       Hip Abduction 4-             Flexibility       Hamstrings (90/90) Limited 45 deg     ITB (Rodríguez)       Quads (Ely's) +20 deg                RESTRICTIONS/PRECAUTIONS: hard of hearing/ Rt ankle history OA    Exercises/Interventions:   Therapeutic Exercise (80211)  Resistance / level Sets/sec Reps Notes / Cues   Bike 2.0 5 min  s=12   Supine slr flexion/ Abd  B 05/18 Changed to 2 sets from 3 due to increased knee popping and having tightness after last session that lasted for a couple days   IB calf stretch  HR Added 4/22   HSS sitting on chair    Standing 3 way hip ^   5/12   Lateral band walks ^   5/12   Prone quad stretch  Added 5/12   LP B 0/ 90 Added 5/12   Isometrics seated F    S/L Hip abduction 2 10            Therapeutic Activities (91421)    Step ups: fwd, lateral w/ HHA Added 4/22          PN assessment  10'            Neuromuscular Re-ed (04146)       Airex: NBOS Added 4/22; CGA req at times   NBOS with ball lifts up/down with eye tracking Added 4/22; CGA req at times   Toe taps on step Added 4/22   Cone taps    Lm step overs        Manual Intervention (64552)       Knee mobs/PROM       Tib/Fem Mobs       Patella Mobs       Tibial traction  7' L LE          Manual stretching: HS, quad  6' Quad stretch in prone   Roller for HS, calves  9'        Modalities: as needed    Self-care/home management: 4/5: positioning when sitting to reduce tightness and pain in L knee with knee extended with slight bend, stairs leading with L LE when descending to reduce pain with step to pattern, education on pathomechanics of meniscus injury with both surgical and conservative treatment, application of voltaren gel as option for pain relief if approved by pt's PCP and/or pharmacist  X 8 min    Pt. Education:  -pt educated on diagnosis, prognosis and expectations for rehab  -all pt questions were answered    Home Exercise Program:  Access Code: [email or web address]  URL: [email or web address]  Date: 04/05/2021  Prepared by: Crissy Barrera    Exercises  Seated Table Hamstring Stretch - 3 x daily - 7 x weekly - 1 sets - 2 reps - 30 hold  Supine Quad Set - 1 x daily - 7 x weekly - 1 sets - 10 reps - 10 hold  Supine navigation   [] (76390)Reviewed/Progressed HEP activities related to improving balance, coordination, kinesthetic sense, posture, motor skill, proprioception of core, proximal hip and LE for self care, mobility, lifting, and ambulation/stair navigation      Manual Treatments:  PROM / STM / Oscillations-Mobs:  G-I, II, III, IV (PA's, Inf., Post.)  [x] (41075) Provided manual therapy to mobilize LE, proximal hip and/or LS spine soft tissue/joints for the purpose of modulating pain, promoting relaxation,  increasing ROM, reducing/eliminating soft tissue swelling/inflammation/restriction, improving soft tissue extensibility and allowing for proper ROM for normal function with self care, mobility, lifting and ambulation. Modalities:  [] (67712) Vasopneumatic compression: Utilized vasopneumatic compression to decrease edema / swelling for the purpose of improving mobility and quad tone / recruitment which will allow for increased overall function including but not limited to self-care, transfers, ambulation, and ascending / descending stairs. Charges:  Timed Code Treatment Minutes: 40   Total Treatment Minutes: 40     [] EVAL - LOW (54844)   [] EVAL - MOD (94086)  [] EVAL - HIGH (84166)  [] RE-EVAL (53662)  [] DO(83838) x   2    [] Ionto  [] NMR (71949) x  1     [] Vaso  [x] Manual (28313) x  2     [] Ultrasound  [x] TA x 1       [] Mech Traction (91218)  [] Aquatic Therapy x      [] ES (un) (09554):   [] Home Management Training x   [] ES(attended) (15584)   [] Dry Needling 1-2 muscles (32904):  [] Dry Needling 3+ muscles (669950  [] Group:      [] Other:     GOALS:   Patient stated goal: decreased pain when sitting, driving, performing stairs, and squatting  [] Progressing: [] Met: [] Not Met: [] Adjusted    Therapist goals for Patient:   Short Term Goals: To be achieved in: 2 weeks  1. Independent in HEP and progression per patient tolerance, in order to prevent re-injury.    [] Progressing: [] Met: [] Not Met: [] Adjusted  2. Patient will have a decrease in pain to facilitate improvement in movement, function, and ADLs as indicated by Functional Deficits. [] Progressing: [] Met: [] Not Met: [] Adjusted    Long Term Goals: To be achieved in: 6 weeks  1. Disability index score of 25% or less for the LEFS to assist with reaching prior level of function. [] Progressing: [] Met: [] Not Met: [] Adjusted  2. Patient will demonstrate increased AROM to 135 deg L knee flexion to allow for proper joint functioning as indicated by patients Functional Deficits. [] Progressing: [] Met: [] Not Met: [] Adjusted  3. Patient will demonstrate an increase in Strength to at least 5/5 as well as good proximal hip strength and control to allow for proper functional mobility as indicated by patients Functional Deficits. [] Progressing: [] Met: [] Not Met: [] Adjusted  4. Patient will return to functional activities including sitting, driving, performing stairs, and squatting without increased symptoms or restriction. [] Progressing: [] Met: [] Not Met: [] Adjusted  5. Patient will be able to perform SLS > 20 sec B. [] Progressing: [] Met: [] Not Met: [] Adjusted    Overall Progression Towards Functional goals/ Treatment Progress Update:  [] Patient is progressing as expected towards functional goals listed. [] Progression is slowed due to complexities/Impairments listed. [] Progression has been slowed due to co-morbidities.   [x] Plan just implemented, too soon to assess goals progression <30days   [] Goals require adjustment due to lack of progress  [] Patient is not progressing as expected and requires additional follow up with physician  [] Other    Persisting Functional Limitations/Impairments:  [x]Sitting []Standing   []Walking [x]Stairs   []Transfers []ADLs   [x]Squatting/bending [x]Kneeling  []Housework []Job related tasks  []Driving []Sports/Recreation   []Sleeping []Other:    ASSESSMENT:  Pt reports improved symptoms with tibial traction, will monitor for response NV. Pt's ROM has not significantly improved. Continue to address L abduction strength. Treatment/Activity Tolerance:  [x] Pt able to complete treatment [] Patient limited by fatique  [] Patient limited by pain  [] Patient limited by other medical complications  [] Other:     Prognosis:  [x] Good [] Fair  [] Poor    Patient Requires Follow-up: [x] Yes  [] No    PLAN: PT 1-2x / week for 3 next weeks. [x] Continue per plan of care [] Alter current plan (see comments)  [] Plan of care initiated [] Hold pending MD visit [] Discharge    Electronically signed by: Marychuy Story, PT DPT  [facility]    Therapist was present, directed the patient's care, made skilled judgement, and was responsible for assessment and treatment of the patient. Note: If patient does not return for scheduled/ recommended follow up visits, this note will serve as a discharge from care along with most recent update on progress.

## 2021-05-25 ENCOUNTER — HOSPITAL ENCOUNTER (OUTPATIENT)
Dept: PHYSICAL THERAPY | Age: 81
Setting detail: THERAPIES SERIES
Discharge: HOME | End: 2021-05-25
Payer: MEDICARE

## 2021-05-25 PROCEDURE — 97140 MANUAL THERAPY 1/> REGIONS: CPT

## 2021-05-25 PROCEDURE — 97110 THERAPEUTIC EXERCISES: CPT

## 2021-05-25 PROCEDURE — 97112 NEUROMUSCULAR REEDUCATION: CPT

## 2021-05-25 NOTE — FLOWSHEET NOTE
were answered    Home Exercise Program:  Access Code: [email or web address]  URL: [email or web address]  Date: 04/05/2021  Prepared by: Anusha Barrera    Exercises  Seated Table Hamstring Stretch - 3 x daily - 7 x weekly - 1 sets - 2 reps - 30 hold  Supine Quad Set - 1 x daily - 7 x weekly - 1 sets - 10 reps - 10 hold  Supine Bridge - 1 x daily - 7 x weekly - 10 reps - 3 sets  Supine Active Straight Leg Raise - 1 x daily - 7 x weekly - 10 reps - 3 sets  Sidelying Hip Abduction - 1 x daily - 7 x weekly - 10 reps - 3 sets  Seated Long Arc Quad - 1 x daily - 7 x weekly - 10 reps - 3 sets    Patient Education  Meniscus Tear    Therapeutic Exercise and NMR EXR  [x] (85111) Provided verbal/tactile cueing for activities related to strengthening, flexibility, endurance, ROM for improvements in LE, proximal hip, and core control with self care, mobility, lifting, ambulation.  [] (67123) Provided verbal/tactile cueing for activities related to improving balance, coordination, kinesthetic sense, posture, motor skill, proprioception  to assist with LE, proximal hip, and core control in self care, mobility, lifting, ambulation and eccentric single leg control.   [] (65007) Therapist is in constant attendance of 2 or more patients providing skilled therapy interventions, but not providing any significant amount of measurable one-on-one time to either patient, for improvements in LE, proximal hip, and core control in self care, mobility, lifting, ambulation and eccentric single leg control.      NMR and Therapeutic Activities:    [] (82761 or 11923) Provided verbal/tactile cueing for activities related to improving balance, coordination, kinesthetic sense, posture, motor skill, proprioception and motor activation to allow for proper function of core, proximal hip and LE with self care and ADLs  [] (36475) Gait Re-education- Provided training and instruction to the patient for proper LE, core and proximal hip recruitment and positioning and eccentric body weight control with ambulation re-education including up and down stairs     Home Exercise Program:    [x] (98978) Reviewed/Progressed HEP activities related to strengthening, flexibility, endurance, ROM of core, proximal hip and LE for functional self-care, mobility, lifting and ambulation/stair navigation   [] (27874)Reviewed/Progressed HEP activities related to improving balance, coordination, kinesthetic sense, posture, motor skill, proprioception of core, proximal hip and LE for self care, mobility, lifting, and ambulation/stair navigation      Manual Treatments:  PROM / STM / Oscillations-Mobs:  G-I, II, III, IV (PA's, Inf., Post.)  [x] (76692) Provided manual therapy to mobilize LE, proximal hip and/or LS spine soft tissue/joints for the purpose of modulating pain, promoting relaxation,  increasing ROM, reducing/eliminating soft tissue swelling/inflammation/restriction, improving soft tissue extensibility and allowing for proper ROM for normal function with self care, mobility, lifting and ambulation. Modalities:  [] (80752) Vasopneumatic compression: Utilized vasopneumatic compression to decrease edema / swelling for the purpose of improving mobility and quad tone / recruitment which will allow for increased overall function including but not limited to self-care, transfers, ambulation, and ascending / descending stairs.        Charges:  Timed Code Treatment Minutes: 50   Total Treatment Minutes: 50     [] EVAL - LOW (60301)   [] EVAL - MOD (00881)  [] EVAL - HIGH (54665)  [] RE-EVAL (93347)  [x] DI(66501) x   1    [] Ionto  [x] NMR (14734) x  1     [] Vaso  [x] Manual (87366) x  1     [] Ultrasound  [] TA x 1       [] Mech Traction (91577)  [] Aquatic Therapy x      [] ES (un) (47490):   [] Home Management Training x   [] ES(attended) (98767)   [] Dry Needling 1-2 muscles (68165):  [] Dry Needling 3+ muscles (763313  [] Group:      [] Other:     GOALS:   Patient stated goal: decreased pain when sitting, driving, performing stairs, and squatting  [] Progressing: [] Met: [] Not Met: [] Adjusted    Therapist goals for Patient:   Short Term Goals: To be achieved in: 2 weeks  1. Independent in HEP and progression per patient tolerance, in order to prevent re-injury. [] Progressing: [] Met: [] Not Met: [] Adjusted  2. Patient will have a decrease in pain to facilitate improvement in movement, function, and ADLs as indicated by Functional Deficits. [] Progressing: [] Met: [] Not Met: [] Adjusted    Long Term Goals: To be achieved in: 6 weeks  1. Disability index score of 25% or less for the LEFS to assist with reaching prior level of function. [] Progressing: [] Met: [] Not Met: [] Adjusted  2. Patient will demonstrate increased AROM to 135 deg L knee flexion to allow for proper joint functioning as indicated by patients Functional Deficits. [] Progressing: [] Met: [] Not Met: [] Adjusted  3. Patient will demonstrate an increase in Strength to at least 5/5 as well as good proximal hip strength and control to allow for proper functional mobility as indicated by patients Functional Deficits. [] Progressing: [] Met: [] Not Met: [] Adjusted  4. Patient will return to functional activities including sitting, driving, performing stairs, and squatting without increased symptoms or restriction. [] Progressing: [] Met: [] Not Met: [] Adjusted  5. Patient will be able to perform SLS > 20 sec B. [] Progressing: [] Met: [] Not Met: [] Adjusted    Overall Progression Towards Functional goals/ Treatment Progress Update:  [] Patient is progressing as expected towards functional goals listed. [] Progression is slowed due to complexities/Impairments listed. [] Progression has been slowed due to co-morbidities.   [x] Plan just implemented, too soon to assess goals progression <30days   [] Goals require adjustment due to lack of progress  [] Patient is not progressing as expected and requires additional follow up with physician  [] Other    Persisting Functional Limitations/Impairments:  [x]Sitting []Standing   []Walking [x]Stairs   []Transfers []ADLs   [x]Squatting/bending [x]Kneeling  []Housework []Job related tasks  []Driving []Sports/Recreation   []Sleeping []Other:    ASSESSMENT:  Focused session on improving hip abduction strength and balance. Pt tolerated session appropriately, mild LOB in excursions and CC walkouts. Will continue to progress as able. Treatment/Activity Tolerance:  [x] Pt able to complete treatment [] Patient limited by fatique  [] Patient limited by pain  [] Patient limited by other medical complications  [] Other:     Prognosis:  [x] Good [] Fair  [] Poor    Patient Requires Follow-up: [x] Yes  [] No    PLAN: PT 1-2x / week for 3 next weeks. [x] Continue per plan of care [] Alter current plan (see comments)  [] Plan of care initiated [] Hold pending MD visit [] Discharge    Electronically signed by: Lani Grace, PT DPT  [facility]    Therapist was present, directed the patient's care, made skilled judgement, and was responsible for assessment and treatment of the patient. Note: If patient does not return for scheduled/ recommended follow up visits, this note will serve as a discharge from care along with most recent update on progress.

## 2021-05-27 ENCOUNTER — HOSPITAL ENCOUNTER (OUTPATIENT)
Dept: PHYSICAL THERAPY | Age: 81
Setting detail: THERAPIES SERIES
Discharge: HOME | End: 2021-05-27
Payer: MEDICARE

## 2021-05-27 PROCEDURE — 97140 MANUAL THERAPY 1/> REGIONS: CPT

## 2021-05-27 PROCEDURE — 97112 NEUROMUSCULAR REEDUCATION: CPT

## 2021-05-27 PROCEDURE — 97110 THERAPEUTIC EXERCISES: CPT

## 2021-06-01 ENCOUNTER — HOSPITAL ENCOUNTER (OUTPATIENT)
Dept: PHYSICAL THERAPY | Age: 81
Setting detail: THERAPIES SERIES
Discharge: HOME | End: 2021-06-01
Payer: MEDICARE

## 2021-06-01 PROCEDURE — 97110 THERAPEUTIC EXERCISES: CPT

## 2021-06-01 PROCEDURE — 97140 MANUAL THERAPY 1/> REGIONS: CPT

## 2021-06-01 NOTE — PLAN OF CARE
Stephanie Gao  Phone: [phone]   Fax: [phone]     Physical Therapy Discharge Summary    Dear   JESSICA Lo CNP,    We had the pleasure of treating the following patient for physical therapy services at [facility]. A summary of our findings can be found in the discharge summary below. If you have any questions or concerns regarding these findings, please do not hesitate to contact me at the office phone number checked above. Thank you for the referral.     Physician Signature:________________________________Date:__________________  By signing above (or electronic signature), therapists plan is approved by physician      Functional Outcome:               LEFS Total Score: 71  LEFS Disability Index: 1-19%                                  Overall Response to Treatment:   []Patient is responding well to treatment and improvement is noted with regards  to goals   []Patient should continue to improve in reasonable time if they continue HEP   []Patient has plateaued and is no longer responding to skilled PT intervention    []Patient is getting worse and would benefit from return to referring MD   []Patient unable to adhere to initial POC   [x]Other: Pt has responded well to PT, met 5 out of 8 goals for therapy. Pt provided HEP and bands to progress strength and balance independently. Date range of Visits: -21  Total Visits: 12    Recommendation:    [x] Discharge to HEP. Follow up with PT or MD PRN.              Physical Therapy Treatment Note/ Progress Report:     Date:  2021    Patient Name:  Steve Queen    :  1940  MRN: [id number]  Restrictions/Precautions:    Medical/Treatment Diagnosis Information:  Diagnosis: M25.562, G89.29 (ICD-10-CM) - Chronic pain of left knee  Treatment Diagnosis: dec L knee flexion ROM, (+) meniscus testing, impaired balance, dec B HS, quad, and calf flexibility  Insurance/Certification information:  PT Insurance Information: Medicare & AARP  Physician Information:  Referring Practitioner: JESSICA Grewal CNP  Plan of care signed (Y/N): [x]  Yes [x]  No     Date of Patient follow up with Physician: 6 months       Progress Report: [x]  Yes  []  No     Date Range for reporting period:  Beginnin/5   Endin/1    Progress report due (10 Rx/or 30 days whichever is less): visit # or  (date)     Recertification due (POC duration/ or 90 days whichever is less): visit # or  (date)     Visit # Insurance Allowable Auth required? Date Range    Med nec   Yes  [x]  No      Latex Allergy:  [x]NO      []YES  Preferred Language for Healthcare:   [x]English       []other:    Functional Scale:        Date assessed:  LEFS: raw score = 49; dysfunction = 38.75%  21  LEFS: raw score = 71; dysfunction = 11.25%  21    Pain level:  3/10     SUBJECTIVE:  Pt states he is having mild lateral L knee pain today. Pt is ready for d/c.      OBJECTIVE:   : Pt with lateral heel wear pattern on R shoe due to heel strike in R LE.      :             PROM AROM     L R L R   Knee Flexion  130   120    Knee Extension               Strength (0-5) / Myotomes Left Right   Hip Flexion - supine       Hip Flexion - seated (L1-2)       Hip Abduction 4-             Flexibility       Hamstrings (90/90) Limited 45 deg     ITB (Rodríguez)       Quads (Ely's) +20 deg                RESTRICTIONS/PRECAUTIONS: hard of hearing/ Rt ankle history OA    Exercises/Interventions:   Therapeutic Exercise (01410)  Resistance / level Sets/sec Reps Notes / Cues   Bike 2.0 3 min  s=12   Supine slr flexion/ Abd  B  Changed to 2 sets from 3 due to increased knee popping and having tightness after last session that lasted for a couple days   IB calf stretch  HR Added    HSS sitting on chair    Standing 3 way hip ^      Lateral band walks ^      Prone quad stretch  Added 5/12   LP B 0/ 90 Added 5/12   Isometrics seated F    S/L Hip abduction    Cable column walkouts 3 plates1 10 B CGA   Standing hip abduction Purple 2 10 B    STS with band around knees Blue 2 10    Bridges with band around knees Blue NV                   Therapeutic Activities (40955)    Step ups: fwd, lateral w/ HHA Added 4/22          PN assessment              Neuromuscular Re-ed (80440)       Airex: NBOS Added 4/22; CGA req at times   NBOS with ball lifts up/down with eye tracking Added 4/22; CGA req at times   Toe taps on step Added 4/22   Cone taps    Lm step overs        Excursions:  Stepping  lunging CGA               Manual Intervention (88918)       Knee mobs/PROM       Tib/Fem Mobs       Patella Mobs       Tibial traction      Hip AROM      Manual stretching: HS, quad  6' Quad stretch in prone   Roller for HS, calves  9'        Modalities: as needed    Self-care/home management: 4/5: positioning when sitting to reduce tightness and pain in L knee with knee extended with slight bend, stairs leading with L LE when descending to reduce pain with step to pattern, education on pathomechanics of meniscus injury with both surgical and conservative treatment, application of voltaren gel as option for pain relief if approved by pt's PCP and/or pharmacist  X 8 min    Pt. Education:  -pt educated on diagnosis, prognosis and expectations for rehab  -all pt questions were answered    5/27:  Bridge with band, excursions (lunes/taps), STS with band around knees, standing hip abduction with purple band. 4/5:  Access Code: [email or web address]  URL: [email or web address]  Date: 04/05/2021  Prepared by: Frida Barrera    Exercises  Seated Table Hamstring Stretch - 3 x daily - 7 x weekly - 1 sets - 2 reps - 30 hold  Supine Quad Set - 1 x daily - 7 x weekly - 1 sets - 10 reps - 10 hold  Supine Bridge - 1 x daily - 7 x weekly - 10 reps - 3 sets  Supine Active Straight Leg Raise - 1 x daily - 7 x weekly - 10 reps - 3 sets  Sidelying Hip Abduction - 1 x daily - 7 x weekly - 10 reps - 3 sets  Seated Long Arc Quad - 1 x daily - 7 x weekly - 10 reps - 3 sets    Patient Education  Meniscus Tear    Therapeutic Exercise and NMR EXR  [x] (75780) Provided verbal/tactile cueing for activities related to strengthening, flexibility, endurance, ROM for improvements in LE, proximal hip, and core control with self care, mobility, lifting, ambulation.  [] (34831) Provided verbal/tactile cueing for activities related to improving balance, coordination, kinesthetic sense, posture, motor skill, proprioception  to assist with LE, proximal hip, and core control in self care, mobility, lifting, ambulation and eccentric single leg control.   [] (39561) Therapist is in constant attendance of 2 or more patients providing skilled therapy interventions, but not providing any significant amount of measurable one-on-one time to either patient, for improvements in LE, proximal hip, and core control in self care, mobility, lifting, ambulation and eccentric single leg control.      NMR and Therapeutic Activities:    [] (96307 or 48746) Provided verbal/tactile cueing for activities related to improving balance, coordination, kinesthetic sense, posture, motor skill, proprioception and motor activation to allow for proper function of core, proximal hip and LE with self care and ADLs  [] (53801) Gait Re-education- Provided training and instruction to the patient for proper LE, core and proximal hip recruitment and positioning and eccentric body weight control with ambulation re-education including up and down stairs     Home Exercise Program:    [x] (75378) Reviewed/Progressed HEP activities related to strengthening, flexibility, endurance, ROM of core, proximal hip and LE for functional self-care, mobility, lifting and ambulation/stair navigation   [] (72766)Reviewed/Progressed HEP activities related to improving balance, coordination, kinesthetic sense, posture, motor skill, proprioception of core, proximal hip and LE for self care, mobility, lifting, and ambulation/stair navigation      Manual Treatments:  PROM / STM / Oscillations-Mobs:  G-I, II, III, IV (PA's, Inf., Post.)  [x] (54245) Provided manual therapy to mobilize LE, proximal hip and/or LS spine soft tissue/joints for the purpose of modulating pain, promoting relaxation,  increasing ROM, reducing/eliminating soft tissue swelling/inflammation/restriction, improving soft tissue extensibility and allowing for proper ROM for normal function with self care, mobility, lifting and ambulation. Modalities:  [] (02835) Vasopneumatic compression: Utilized vasopneumatic compression to decrease edema / swelling for the purpose of improving mobility and quad tone / recruitment which will allow for increased overall function including but not limited to self-care, transfers, ambulation, and ascending / descending stairs. Charges:  Timed Code Treatment Minutes: 40   Total Treatment Minutes: 40     [] EVAL - LOW (58935)   [] EVAL - MOD (97801)  [] EVAL - HIGH (60108)  [] RE-EVAL (70434)  [x] XJ(57763) x   2    [] Ionto  [] NMR (06473) x       [] Vaso  [x] Manual (71608) x  1     [] Ultrasound  [] TA x 1       [] Mech Traction (85453)  [] Aquatic Therapy x      [] ES (un) (02228):   [] Home Management Training x   [] ES(attended) (90798)   [] Dry Needling 1-2 muscles (73464):  [] Dry Needling 3+ muscles (498320  [] Group:      [] Other:     GOALS:   Patient stated goal: decreased pain when sitting, driving, performing stairs, and squatting  [] Progressing: [x] Met: [] Not Met: [] Adjusted    Therapist goals for Patient:   Short Term Goals: To be achieved in: 2 weeks  1. Independent in HEP and progression per patient tolerance, in order to prevent re-injury. [] Progressing: [x] Met: [] Not Met: [] Adjusted  2.  Patient will have a decrease in pain to facilitate improvement in movement, function, and ADLs as indicated by Functional Deficits. [] Progressing: [x] Met: [] Not Met: [] Adjusted    Long Term Goals: To be achieved in: 6 weeks  1. Disability index score of 25% or less for the LEFS to assist with reaching prior level of function. [] Progressing: [x] Met: [] Not Met: [] Adjusted  2. Patient will demonstrate increased AROM to 135 deg L knee flexion to allow for proper joint functioning as indicated by patients Functional Deficits. [] Progressing: [] Met: [x] Not Met: [] Adjusted  3. Patient will demonstrate an increase in Strength to at least 5/5 as well as good proximal hip strength and control to allow for proper functional mobility as indicated by patients Functional Deficits. [] Progressing: [] Met: [x] Not Met: [] Adjusted  4. Patient will return to functional activities including sitting, driving, performing stairs, and squatting without increased symptoms or restriction. [] Progressing: [x] Met: [] Not Met: [] Adjusted  5. Patient will be able to perform SLS > 20 sec B. [] Progressing: [] Met: [x] Not Met: [] Adjusted    Overall Progression Towards Functional goals/ Treatment Progress Update:  [x] Patient is progressing as expected towards functional goals listed. [] Progression is slowed due to complexities/Impairments listed. [] Progression has been slowed due to co-morbidities. [] Plan just implemented, too soon to assess goals progression <30days   [] Goals require adjustment due to lack of progress  [] Patient is not progressing as expected and requires additional follow up with physician  [] Other    Persisting Functional Limitations/Impairments:  [x]Sitting []Standing   []Walking [x]Stairs   []Transfers []ADLs   [x]Squatting/bending [x]Kneeling  []Housework []Job related tasks  []Driving []Sports/Recreation   []Sleeping []Other:    ASSESSMENT:  D/C note as above.     Treatment/Activity Tolerance:  [x] Pt able to complete treatment [] Patient limited by jeet  [] Patient limited by

## 2021-06-03 ENCOUNTER — APPOINTMENT (OUTPATIENT)
Dept: PHYSICAL THERAPY | Age: 81
End: 2021-06-03
Payer: MEDICARE

## 2021-07-29 DIAGNOSIS — E78.00 HYPERCHOLESTEREMIA: ICD-10-CM

## 2021-07-29 RX ORDER — SIMVASTATIN 20 MG
TABLET ORAL
Qty: 90 TABLET | Refills: 0 | Status: SHIPPED | OUTPATIENT
Start: 2021-07-29 | End: 2021-09-30 | Stop reason: SDUPTHER

## 2021-08-02 ENCOUNTER — OFFICE VISIT (OUTPATIENT)
Dept: ENT CLINIC | Age: 81
End: 2021-08-02
Payer: MEDICARE

## 2021-08-02 VITALS
SYSTOLIC BLOOD PRESSURE: 137 MMHG | HEART RATE: 66 BPM | WEIGHT: 206 LBS | BODY MASS INDEX: 31.32 KG/M2 | DIASTOLIC BLOOD PRESSURE: 67 MMHG

## 2021-08-02 DIAGNOSIS — H61.23 BILATERAL IMPACTED CERUMEN: Primary | ICD-10-CM

## 2021-08-02 DIAGNOSIS — H90.0 CONDUCTIVE HEARING LOSS, BILATERAL: ICD-10-CM

## 2021-08-02 PROCEDURE — 69210 REMOVE IMPACTED EAR WAX UNI: CPT | Performed by: OTOLARYNGOLOGY

## 2021-08-02 NOTE — PROGRESS NOTES
Chief Complaint   Patient presents with    Cerumen Impaction     ears feel full, narrow canals     Hearing Loss       HISTORY:    Radha Huang stated that the hearing is decreased in both ears, which are plugged up with ear wax. EXAMINATION:    Both external ear canals were occluded by cerumen impaction, obscuring visualization of the TMs. PROCEDURE - REMOVAL OF BILATERAL CERUMEN IMPACTION:   The cerumen impaction was removed from both of the EACs, under otomicroscopy visualization, with instrumentation, using a Billeau wire loop. After successful cerumen removal, the EACs appeared to be normal and clear bilaterally without mass, exudate, or edema. The tympanic membranes appeared to be normal, including normal pneumatic mobility bilaterally. There was no evidence of acute disease. Radha Huang reported improved hearing, back to usual level, after cerumen removal.          HEARING ASSESSMENT:  Finger rub:  Able to hear finger rub bilaterally. Tuning fork tests, [address] tuning fork:  Mendoza was heard in both ears. Rinne air > bone bilaterally. IMPRESSION / Renae Camara / Mehdi Polo / PROCEDURES:       Radha Huang was seen today for cerumen impaction and hearing loss. Diagnoses and all orders for this visit:    Bilateral impacted cerumen    Conductive hearing loss, bilateral         RECOMMENDATIONS / PLAN:   1. See Patient Instructions on file for this visit. 2. Return for recurrence of symptoms of excessive ear wax, or any further ear nose throat or sinus problems.

## 2021-08-25 ENCOUNTER — OFFICE VISIT (OUTPATIENT)
Dept: FAMILY MEDICINE CLINIC | Age: 81
End: 2021-08-25
Payer: MEDICARE

## 2021-08-25 VITALS
OXYGEN SATURATION: 97 % | BODY MASS INDEX: 31.22 KG/M2 | DIASTOLIC BLOOD PRESSURE: 68 MMHG | HEIGHT: 68 IN | HEART RATE: 74 BPM | TEMPERATURE: 97.7 F | SYSTOLIC BLOOD PRESSURE: 118 MMHG | WEIGHT: 206 LBS

## 2021-08-25 DIAGNOSIS — R06.2 WHEEZING: Primary | ICD-10-CM

## 2021-08-25 PROCEDURE — G8417 CALC BMI ABV UP PARAM F/U: HCPCS | Performed by: NURSE PRACTITIONER

## 2021-08-25 PROCEDURE — 99213 OFFICE O/P EST LOW 20 MIN: CPT | Performed by: NURSE PRACTITIONER

## 2021-08-25 PROCEDURE — 4040F PNEUMOC VAC/ADMIN/RCVD: CPT | Performed by: NURSE PRACTITIONER

## 2021-08-25 PROCEDURE — 1036F TOBACCO NON-USER: CPT | Performed by: NURSE PRACTITIONER

## 2021-08-25 PROCEDURE — G8427 DOCREV CUR MEDS BY ELIG CLIN: HCPCS | Performed by: NURSE PRACTITIONER

## 2021-08-25 PROCEDURE — 1123F ACP DISCUSS/DSCN MKR DOCD: CPT | Performed by: NURSE PRACTITIONER

## 2021-08-25 RX ORDER — ALBUTEROL SULFATE 90 UG/1
2 AEROSOL, METERED RESPIRATORY (INHALATION) 4 TIMES DAILY PRN
Qty: 1 INHALER | Refills: 0 | Status: SHIPPED | OUTPATIENT
Start: 2021-08-25 | End: 2022-06-02 | Stop reason: ALTCHOICE

## 2021-08-25 SDOH — ECONOMIC STABILITY: FOOD INSECURITY: WITHIN THE PAST 12 MONTHS, YOU WORRIED THAT YOUR FOOD WOULD RUN OUT BEFORE YOU GOT MONEY TO BUY MORE.: NEVER TRUE

## 2021-08-25 SDOH — ECONOMIC STABILITY: FOOD INSECURITY: WITHIN THE PAST 12 MONTHS, THE FOOD YOU BOUGHT JUST DIDN'T LAST AND YOU DIDN'T HAVE MONEY TO GET MORE.: NEVER TRUE

## 2021-08-25 SDOH — ECONOMIC STABILITY: TRANSPORTATION INSECURITY
IN THE PAST 12 MONTHS, HAS LACK OF TRANSPORTATION KEPT YOU FROM MEETINGS, WORK, OR FROM GETTING THINGS NEEDED FOR DAILY LIVING?: NO

## 2021-08-25 SDOH — ECONOMIC STABILITY: TRANSPORTATION INSECURITY
IN THE PAST 12 MONTHS, HAS THE LACK OF TRANSPORTATION KEPT YOU FROM MEDICAL APPOINTMENTS OR FROM GETTING MEDICATIONS?: NO

## 2021-08-25 ASSESSMENT — ENCOUNTER SYMPTOMS
TROUBLE SWALLOWING: 0
SORE THROAT: 0
WHEEZING: 1
RHINORRHEA: 1
SHORTNESS OF BREATH: 0
SINUS PAIN: 0
CHOKING: 0
STRIDOR: 0
VOICE CHANGE: 0
COUGH: 0
SINUS PRESSURE: 0
CHEST TIGHTNESS: 0

## 2021-08-25 ASSESSMENT — SOCIAL DETERMINANTS OF HEALTH (SDOH): HOW HARD IS IT FOR YOU TO PAY FOR THE VERY BASICS LIKE FOOD, HOUSING, MEDICAL CARE, AND HEATING?: NOT HARD AT ALL

## 2021-08-25 NOTE — PATIENT INSTRUCTIONS
Patient Education        albuterol inhalation  Pronunciation:  indigo polanco all  Brand:  ProAir HFA, ProAir RespiClick, Proventil HFA, Ventolin HFA  What is the most important information I should know about albuterol inhalation? Follow all directions on your medicine label and package. Tell each of your healthcare providers about all your medical conditions, allergies, and all medicines you use. What is albuterol inhalation? Albuterol inhalation is a bronchodilator that is used to treat or prevent bronchospasm in people with reversible obstructive airway disease. Albuterol is also used to prevent exercise-induced bronchospasm. Albuterol inhalation is for use in adults and children at least 3years old. Albuterol inhalation may also be used for purposes not listed in this medication guide. What should I discuss with my healthcare provider before using albuterol inhalation? You should not use this medicine if you are allergic to albuterol. You should not use ProAir RespiClick if you are allergic to milk proteins. Tell your doctor if you have ever had:  · heart disease, high blood pressure;  · a thyroid disorder;  · seizures;  · diabetes; or  · low levels of potassium in your blood. Tell your doctor if you are pregnant or plan to become pregnant. It is not known whether albuterol will harm an unborn baby. However, having uncontrolled asthma during pregnancy may increase the risk of premature birth, low birth weight, or eclampsia (dangerously high blood pressure that can lead to medical problems in both mother and baby). The benefit of preventing bronchospasm may outweigh any risks to the baby. If you are pregnant, your name may be listed on a pregnancy registry to track the effects of albuterol on the baby. It may not be safe to breastfeed while using this medicine. Ask your doctor about any risk. How should I use albuterol inhalation?   Follow all directions on your prescription label and read all medication guides. Use the medicine exactly as directed. Do not allow a young child to use albuterol inhalation without help from an adult. To prevent exercise-induced bronchospasm, use this medicine 15 to 30 minutes before you exercise. The effects of albuterol inhalation should last about 4 to 6 hours. Seek medical attention if your breathing problems get worse quickly, or if you think your asthma medications are not working as well. Read and carefully follow any Instructions for Use provided with your medicine. Ask your doctor or pharmacist if you do not understand these instructions. ProAir HFA, Proventil HFA, or Ventolin HFA must be shaken before each use. You do not need to shake ProAir RespiClick before using. Do not try to clean or take apart the ProAir RespiClick inhaler device. Always use the new inhaler device provided with your refill. Do not float a medicine canister in water to see if it is empty. Your dose needs may change due to surgery, illness, stress, or a recent asthma attack. Do not change your dose or dosing schedule without your doctor's advice. Store at room temperature away from moisture, heat, or cold temperatures. Keep the cover on your ProAir RespiClick inhaler when not in use. Store Proventil or Ventolin with the mouthpiece down. Keep the inhaler canister away from open flame or high heat. The canister may explode if it gets too hot. Do not puncture or burn an empty inhaler canister. What happens if I miss a dose? Use the medicine as soon as you can, but skip the missed dose if it is almost time for your next dose. Do not use two doses at one time. Get your prescription refilled before you run out of medicine completely. What happens if I overdose? Seek emergency medical attention or call the Poison Help line at 1-293.908.1468.  An overdose of albuterol can be fatal.  Overdose symptoms may include dry mouth, tremors, chest pain, fast heartbeats, nausea, general ill feeling, seizure, feeling light-headed or fainting. What should I avoid while using albuterol inhalation? Rinse with water if this medicine gets in your eyes. What are the possible side effects of albuterol inhalation? Get emergency medical help if you have signs of an allergic reaction: hives; difficult breathing; swelling of your face, lips, tongue, or throat. Call your doctor at once if you have:  · wheezing, choking, or other breathing problems after using this medicine;  · chest pain, fast heart rate, pounding heartbeats or fluttering in your chest;  · severe headache, pounding in your neck or ears;  · pain or burning when you urinate;  · high blood sugar --increased thirst, increased urination, dry mouth, fruity breath odor; or  · low potassium --leg cramps, constipation, irregular heartbeats, increased thirst or urination, numbness or tingling, muscle weakness or limp feeling. Common side effects may include:  · chest pain, fast or pounding heartbeats;  · upset stomach, vomiting;  · painful urination;  · dizziness;  · feeling shaky or nervous;  · headache, back pain, body aches; or  · cough, sore throat, sinus pain, runny or stuffy nose. This is not a complete list of side effects and others may occur. Call your doctor for medical advice about side effects. You may report side effects to FDA at 1-637-FDA-2743. What other drugs will affect albuterol inhalation? Tell your doctor about all your other medicines, especially:  · any other inhaled medicines or bronchodilators;  · digoxin;  · a diuretic or \"water pill\";  · an antidepressant --amitriptyline, desipramine, imipramine, doxepin, nortriptyline, and others;  · a beta blocker --atenolol, carvedilol, labetalol, metoprolol, propranolol, sotalol, and others; or  · an MAO inhibitor --isocarboxazid, linezolid, methylene blue injection, phenelzine, rasagiline, selegiline, tranylcypromine, and others. This list is not complete.  Other drugs may affect albuterol inhalation, including prescription and over-the-counter medicines, vitamins, and herbal products. Not all possible drug interactions are listed here. Where can I get more information? Your pharmacist can provide more information about albuterol inhalation. Remember, keep this and all other medicines out of the reach of children, never share your medicines with others, and use this medication only for the indication prescribed. Every effort has been made to ensure that the information provided by [address] [place] [address]  is accurate, up-to-date, and complete, but no guarantee is made to that effect. Drug information contained herein may be time sensitive. [facility] information has been compiled for use by healthcare practitioners and consumers in the [place] and therefore [facility] does not warrant that uses outside of the [place] are appropriate, unless specifically indicated otherwise. [facility]'s drug information does not endorse drugs, diagnose patients or recommend therapy. [facility] drug information is an informational resource designed to assist licensed healthcare practitioners in caring for their patients and/or to serve consumers viewing this service as a supplement to, and not a substitute for, the expertise, skill, knowledge and judgment of healthcare practitioners. The absence of a warning for a given drug or drug combination in no way should be construed to indicate that the drug or drug combination is safe, effective or appropriate for any given patient. [facility] does not assume any responsibility for any aspect of healthcare administered with the aid of information [facility] provides. The information contained herein is not intended to cover all possible uses, directions, precautions, warnings, drug interactions, allergic reactions, or adverse effects.  If you have questions about the drugs you are taking, check with your doctor, nurse or pharmacist.  Copyright 3044-1992 [employer] Version: 10.01. Revision date: 11/18/2020. Care instructions adapted under license by [facility] ([facility]). If you have questions about a medical condition or this instruction, always ask your healthcare professional. [address] any warranty or liability for your use of this information.

## 2021-08-25 NOTE — PROGRESS NOTES
Jared Ely (:  1940) is a 80 y.o. male,Established patient, here for evaluation of the following chief complaint(s):  Wheezing (PT CO WHEEZING WHEN HE BREATHES ABOUT 1 WEEK- NO SOB OR OTHER SX. HAD A COLD SO THINK MAYBE FROM THAT. )      ASSESSMENT/PLAN:  1. Wheezing  -Patient has expiratory wheezing on evaluation. Likely residual from the cold that he had recently. An albuterol inhaler is being sent to the pharmacy. The patient was educated on medication use as well as side effects. Also educated on proper inhaler use and the use of rinsing after treatment to prevent thrush. Also encouraged to take daily over-the-counter allergy medication. Follow-up as needed if no improvement. May need PFT. Has AWV scheduled next month. -     albuterol sulfate HFA (VENTOLIN HFA) 108 (90 Base) MCG/ACT inhaler; Inhale 2 puffs into the lungs 4 times daily as needed for Wheezing, Disp-1 Inhaler, R-0Normal      Return in about 5 weeks (around 2021) for Annual Wellness Visit. SUBJECTIVE/OBJECTIVE:  HPI  Been presents today for evaluation of wheezing for about a week. He states that prior to this he had a cold. He believes that the wheezing may be from this, but he wanted to be evaluated. He does feel like it has been slowly getting better with time, but it is persistent. Comes and goes. Denies any fevers or chills. Denies any shortness of breath. Denies any cough. He is not taking any allergy medication or other over-the-counter remedies. He does state that he had childhood asthma, but it resolved on its own and never needed an inhaler. Review of Systems   Constitutional: Negative for chills and fever. HENT: Positive for postnasal drip and rhinorrhea. Negative for congestion, ear discharge, ear pain, hearing loss, sinus pressure, sinus pain, sore throat, trouble swallowing and voice change. Respiratory: Positive for wheezing.  Negative for cough, choking, chest tightness, shortness of breath and stridor. Cardiovascular: Negative for chest pain, palpitations and leg swelling. Neurological: Negative for dizziness, weakness, light-headedness, numbness and headaches. Psychiatric/Behavioral: Negative for decreased concentration, dysphoric mood and sleep disturbance. The patient is not nervous/anxious. Physical Exam  Constitutional:       General: He is not in acute distress. Appearance: Normal appearance. He is obese. Cardiovascular:      Rate and Rhythm: Normal rate and regular rhythm. Pulses: Normal pulses. Heart sounds: Normal heart sounds. No murmur heard. No gallop. Pulmonary:      Effort: Pulmonary effort is normal.      Breath sounds: Wheezing present. Comments: Expiratory wheezing throughout  Neurological:      Mental Status: He is alert and oriented to person, place, and time. Psychiatric:         Mood and Affect: Mood normal.         Behavior: Behavior normal.         Thought Content: Thought content normal.         Judgment: Judgment normal.               This dictation was generated by voice recognition computer software. Although all attempts are made to edit the dictation for accuracy, there may be errors in the transcription that are not intended. An electronic signature was used to authenticate this note.     --JESSICA Pool - CNP

## 2021-08-26 ENCOUNTER — TELEPHONE (OUTPATIENT)
Dept: FAMILY MEDICINE CLINIC | Age: 81
End: 2021-08-26

## 2021-08-26 NOTE — TELEPHONE ENCOUNTER
Patient saw Jordana Miles 411 yesterday and he wants to know if it is okay to use the inhaler, because he takes a beta blocker. Please give him a call back.

## 2021-09-01 DIAGNOSIS — I10 ESSENTIAL HYPERTENSION: ICD-10-CM

## 2021-09-02 RX ORDER — HYDROCHLOROTHIAZIDE 25 MG/1
TABLET ORAL
Qty: 90 TABLET | Refills: 0 | Status: SHIPPED | OUTPATIENT
Start: 2021-09-02 | End: 2021-09-30 | Stop reason: SDUPTHER

## 2021-09-23 ASSESSMENT — LIFESTYLE VARIABLES
AUDIT-C TOTAL SCORE: 1
HOW OFTEN DURING THE LAST YEAR HAVE YOU FOUND THAT YOU WERE NOT ABLE TO STOP DRINKING ONCE YOU HAD STARTED: NEVER
HOW OFTEN DURING THE LAST YEAR HAVE YOU BEEN UNABLE TO REMEMBER WHAT HAPPENED THE NIGHT BEFORE BECAUSE YOU HAD BEEN DRINKING: NEVER
HAS A RELATIVE, FRIEND, DOCTOR, OR ANOTHER HEALTH PROFESSIONAL EXPRESSED CONCERN ABOUT YOUR DRINKING OR SUGGESTED YOU CUT DOWN: 0
AUDIT TOTAL SCORE: 0
HOW OFTEN DURING THE LAST YEAR HAVE YOU FAILED TO DO WHAT WAS NORMALLY EXPECTED FROM YOU BECAUSE OF DRINKING: NEVER
HOW OFTEN DURING THE LAST YEAR HAVE YOU HAD A FEELING OF GUILT OR REMORSE AFTER DRINKING: NEVER
HOW MANY STANDARD DRINKS CONTAINING ALCOHOL DO YOU HAVE ON A TYPICAL DAY: 0
HOW OFTEN DO YOU HAVE SIX OR MORE DRINKS ON ONE OCCASION: 0
HAVE YOU OR SOMEONE ELSE BEEN INJURED AS A RESULT OF YOUR DRINKING: NO
HOW OFTEN DURING THE LAST YEAR HAVE YOU FAILED TO DO WHAT WAS NORMALLY EXPECTED FROM YOU BECAUSE OF DRINKING: 0
HOW OFTEN DO YOU HAVE A DRINK CONTAINING ALCOHOL: MONTHLY OR LESS
HOW OFTEN DURING THE LAST YEAR HAVE YOU NEEDED AN ALCOHOLIC DRINK FIRST THING IN THE MORNING TO GET YOURSELF GOING AFTER A NIGHT OF HEAVY DRINKING: NEVER
AUDIT-C TOTAL SCORE: 0
HOW OFTEN DURING THE LAST YEAR HAVE YOU BEEN UNABLE TO REMEMBER WHAT HAPPENED THE NIGHT BEFORE BECAUSE YOU HAD BEEN DRINKING: 0
HAVE YOU OR SOMEONE ELSE BEEN INJURED AS A RESULT OF YOUR DRINKING: 0
HOW OFTEN DURING THE LAST YEAR HAVE YOU NEEDED AN ALCOHOLIC DRINK FIRST THING IN THE MORNING TO GET YOURSELF GOING AFTER A NIGHT OF HEAVY DRINKING: 0
HAS A RELATIVE, FRIEND, DOCTOR, OR ANOTHER HEALTH PROFESSIONAL EXPRESSED CONCERN ABOUT YOUR DRINKING OR SUGGESTED YOU CUT DOWN: NO
HOW MANY STANDARD DRINKS CONTAINING ALCOHOL DO YOU HAVE ON A TYPICAL DAY: ONE OR TWO
HOW OFTEN DO YOU HAVE A DRINK CONTAINING ALCOHOL: 1
HOW OFTEN DURING THE LAST YEAR HAVE YOU HAD A FEELING OF GUILT OR REMORSE AFTER DRINKING: 0
AUDIT TOTAL SCORE: 1
HOW OFTEN DURING THE LAST YEAR HAVE YOU FOUND THAT YOU WERE NOT ABLE TO STOP DRINKING ONCE YOU HAD STARTED: 0
HOW OFTEN DO YOU HAVE SIX OR MORE DRINKS ON ONE OCCASION: NEVER

## 2021-09-23 ASSESSMENT — PATIENT HEALTH QUESTIONNAIRE - PHQ9
1. LITTLE INTEREST OR PLEASURE IN DOING THINGS: 0
2. FEELING DOWN, DEPRESSED OR HOPELESS: 0
SUM OF ALL RESPONSES TO PHQ QUESTIONS 1-9: 0
SUM OF ALL RESPONSES TO PHQ9 QUESTIONS 1 & 2: 0

## 2021-09-30 ENCOUNTER — OFFICE VISIT (OUTPATIENT)
Dept: FAMILY MEDICINE CLINIC | Age: 81
End: 2021-09-30
Payer: MEDICARE

## 2021-09-30 VITALS
WEIGHT: 205 LBS | BODY MASS INDEX: 31.07 KG/M2 | HEART RATE: 72 BPM | OXYGEN SATURATION: 99 % | SYSTOLIC BLOOD PRESSURE: 114 MMHG | DIASTOLIC BLOOD PRESSURE: 70 MMHG | HEIGHT: 68 IN | TEMPERATURE: 97.9 F

## 2021-09-30 DIAGNOSIS — Z00.00 ROUTINE GENERAL MEDICAL EXAMINATION AT A HEALTH CARE FACILITY: Primary | ICD-10-CM

## 2021-09-30 DIAGNOSIS — I10 ESSENTIAL HYPERTENSION: ICD-10-CM

## 2021-09-30 DIAGNOSIS — E78.00 HYPERCHOLESTEREMIA: ICD-10-CM

## 2021-09-30 DIAGNOSIS — M19.071 ARTHRITIS OF RIGHT ANKLE: ICD-10-CM

## 2021-09-30 DIAGNOSIS — Z23 NEED FOR INFLUENZA VACCINATION: ICD-10-CM

## 2021-09-30 PROCEDURE — G0008 ADMIN INFLUENZA VIRUS VAC: HCPCS | Performed by: NURSE PRACTITIONER

## 2021-09-30 PROCEDURE — 1123F ACP DISCUSS/DSCN MKR DOCD: CPT | Performed by: NURSE PRACTITIONER

## 2021-09-30 PROCEDURE — G0439 PPPS, SUBSEQ VISIT: HCPCS | Performed by: NURSE PRACTITIONER

## 2021-09-30 PROCEDURE — 90694 VACC AIIV4 NO PRSRV 0.5ML IM: CPT | Performed by: NURSE PRACTITIONER

## 2021-09-30 PROCEDURE — 4040F PNEUMOC VAC/ADMIN/RCVD: CPT | Performed by: NURSE PRACTITIONER

## 2021-09-30 RX ORDER — HYDROCHLOROTHIAZIDE 25 MG/1
TABLET ORAL
Qty: 90 TABLET | Refills: 3 | Status: SHIPPED | OUTPATIENT
Start: 2021-09-30 | End: 2022-03-31 | Stop reason: SDUPTHER

## 2021-09-30 RX ORDER — CELECOXIB 200 MG/1
200 CAPSULE ORAL DAILY
Qty: 90 CAPSULE | Refills: 3 | Status: SHIPPED | OUTPATIENT
Start: 2021-09-30 | End: 2022-03-31

## 2021-09-30 RX ORDER — ENALAPRIL MALEATE 20 MG/1
TABLET ORAL
Qty: 180 TABLET | Refills: 3 | Status: SHIPPED | OUTPATIENT
Start: 2021-09-30 | End: 2022-03-31 | Stop reason: SDUPTHER

## 2021-09-30 RX ORDER — SIMVASTATIN 20 MG
TABLET ORAL
Qty: 90 TABLET | Refills: 3 | Status: SHIPPED | OUTPATIENT
Start: 2021-09-30 | End: 2022-03-31 | Stop reason: SDUPTHER

## 2021-09-30 RX ORDER — LABETALOL 200 MG/1
TABLET, FILM COATED ORAL
Qty: 180 TABLET | Refills: 3 | Status: SHIPPED | OUTPATIENT
Start: 2021-09-30 | End: 2022-03-31 | Stop reason: SDUPTHER

## 2021-09-30 ASSESSMENT — PATIENT HEALTH QUESTIONNAIRE - PHQ9
SUM OF ALL RESPONSES TO PHQ QUESTIONS 1-9: 0
SUM OF ALL RESPONSES TO PHQ9 QUESTIONS 1 & 2: 0
2. FEELING DOWN, DEPRESSED OR HOPELESS: 0
SUM OF ALL RESPONSES TO PHQ QUESTIONS 1-9: 0
1. LITTLE INTEREST OR PLEASURE IN DOING THINGS: 0
SUM OF ALL RESPONSES TO PHQ QUESTIONS 1-9: 0

## 2021-09-30 NOTE — PATIENT INSTRUCTIONS
Personalized Preventive Plan for Gurpreet Talley - 9/30/2021  Medicare offers a range of preventive health benefits. Some of the tests and screenings are paid in full while other may be subject to a deductible, co-insurance, and/or copay. Some of these benefits include a comprehensive review of your medical history including lifestyle, illnesses that may run in your family, and various assessments and screenings as appropriate. After reviewing your medical record and screening and assessments performed today your provider may have ordered immunizations, labs, imaging, and/or referrals for you. A list of these orders (if applicable) as well as your Preventive Care list are included within your After Visit Summary for your review. Other Preventive Recommendations:    · A preventive eye exam performed by an eye specialist is recommended every 1-2 years to screen for glaucoma; cataracts, macular degeneration, and other eye disorders. · A preventive dental visit is recommended every 6 months. · Try to get at least 150 minutes of exercise per week or 10,000 steps per day on a pedometer . · Order or download the FREE \"Exercise & Physical Activity: Your Everyday Guide\" from [employer]. Call 7-375.741.1253 or search [employer] online. · You need 2668-1391 mg of calcium and 4361-7415 IU of vitamin D per day. It is possible to meet your calcium requirement with diet alone, but a vitamin D supplement is usually necessary to meet this goal.  · When exposed to the sun, use a sunscreen that protects against both UVA and UVB radiation with an SPF of 30 or greater. Reapply every 2 to 3 hours or after sweating, drying off with a towel, or swimming. · Always wear a seat belt when traveling in a car. Always wear a helmet when riding a bicycle or motorcycle.

## 2021-09-30 NOTE — PROGRESS NOTES
Medicare Annual Wellness Visit  Name: Wilma Jackson Date: 2021   MRN: [id number] Sex: Male   Age: 80 y.o. Ethnicity: Non- / Non    : 1940 Race: White (non-)      Karen Parada is here for Medicare AWV (MEDICARE AWV )    Screenings for behavioral, psychosocial and functional/safety risks, and cognitive dysfunction are all negative except as indicated below. These results, as well as other patient data from the [address] form, are documented in Flowsheets linked to this Encounter. No Known Allergies      Prior to Visit Medications    Medication Sig Taking? Authorizing Provider   enalapril (VASOTEC) 20 MG tablet One tablet twice daily for blood pressure Yes Rosiland Jimenez, APRN - CNP   labetalol (NORMODYNE) 200 MG tablet One tab twice daily Yes Rosiland Jimenez, APRN - CNP   celecoxib (CELEBREX) 200 MG capsule Take 1 capsule by mouth daily Yes Rosiland Jimenez, APRN - CNP   simvastatin (ZOCOR) 20 MG tablet TAKE ONE TABLET EVERY EVENING FOR CHOLESTEROL Yes Rosiland Jimenez, APRN - CNP   hydroCHLOROthiazide (HYDRODIURIL) 25 MG tablet TAKE 1 TABLET EVERY DAY FOR BLOOD PRESSURE Yes Rosiland Jimenez, APRN - CNP   albuterol sulfate HFA (VENTOLIN HFA) 108 (90 Base) MCG/ACT inhaler Inhale 2 puffs into the lungs 4 times daily as needed for Wheezing Yes Rosiland Jimenez, APRN - CNP   Omega-3 Fatty Acids (FISH OIL) 1200 MG CAPS Take 1 capsule by mouth daily. Yes Historical Provider, MD   latanoprost (XALATAN) 0.005 % ophthalmic solution Place 1 drop into both eyes nightly. Yes Raymon Douglas MD   Cholecalciferol (VITAMIN D) 2000 UNITS CAPS capsule 1 capsule daily. With food Yes Historical Provider, MD   Niacin 1000 MG TBCR 1,000 mg daily.  Yes Historical Provider, MD         Past Medical History:   Diagnosis Date    Aortic regurgitation 2009    Arthritis     Pt says in his right ankle    Environmental allergies     Heart murmur     Hypercholesterolemia  Hypertension     Impaired fasting glucose     Prostate cancer ([facility] [address] 01/01/2006    Samuel       Past Surgical History:   Procedure Laterality Date    COLONOSCOPY  01/2017    Repeat 5 years    HAND SURGERY Right 2012    OTHER SURGICAL HISTORY  2008    RADIOACTIVE SEED IMPLANTS FOR PROSTATE CANCER    PROSTATE SURGERY  2007    seed implant Ramin Ari)         Family History   Problem Relation Age of Onset   [de-identified] Cancer Mother         breast    Heart Disease Father 77        MI    Diabetes Paternal Grandmother        CareTeam (Including outside providers/suppliers regularly involved in providing care):   Patient Care Team:  Ganesh Rdz MD as PCP - General (Family Medicine)  Ganesh Rdz MD as PCP - [facility]    Wt Readings from Last 3 Encounters:   09/30/21 205 lb (93 kg)   08/25/21 206 lb (93.4 kg)   08/02/21 206 lb (93.4 kg)     Vitals:    09/30/21 1123   BP: 114/70   Site: Left Upper Arm   Position: Sitting   Cuff Size: Medium Adult   Pulse: 72   Temp: 97.9 °F (36.6 °C)   TempSrc: Temporal   SpO2: 99%   Weight: 205 lb (93 kg)   Height: 5' 8\" (1.727 m)     Body mass index is 31.17 kg/m². Based upon direct observation of the patient, evaluation of cognition reveals recent and remote memory intact. General Appearance: Obese.  alert and oriented to person, place and time, well developed and well- nourished, in no acute distress  Skin: warm and dry, no rash or erythema  Head: normocephalic and atraumatic  Eyes: pupils equal, round, and reactive to light, extraocular eye movements intact, conjunctivae normal  ENT: tympanic membrane, external ear and ear canal normal bilaterally, nose without deformity, nasal mucosa and turbinates normal without polyps  Neck: supple and non-tender without mass, no thyromegaly or thyroid nodules, no cervical lymphadenopathy  Pulmonary/Chest: clear to auscultation bilaterally- no wheezes, rales or rhonchi, normal air movement, no respiratory distress  Cardiovascular: Grade III systolic murmur. normal rate, regular rhythm, normal S1 and S2, rubs, clicks, or gallops, distal pulses intact, no carotid bruits  Abdomen: soft, non-tender, non-distended, normal bowel sounds, no masses or organomegaly  Extremities: no cyanosis, clubbing or edema  Musculoskeletal: normal range of motion, no joint swelling, deformity or tenderness  Neurologic: reflexes normal and symmetric, no cranial nerve deficit, gait, coordination and speech normal    Patient's complete Health Risk Assessment and screening values have been reviewed and are found in Flowsheets. The following problems were reviewed today and where indicated follow up appointments were made and/or referrals ordered. Positive Risk Factor Screenings with Interventions:           Health Habits/Nutrition:  Health Habits/Nutrition  Do you exercise for at least 20 minutes 2-3 times per week?: (!) No  Have you lost any weight without trying in the past 3 months?: No  Do you eat only one meal per day?: No  Have you seen the dentist within the past year?: Yes  Body mass index: (!) 31.17  Health Habits/Nutrition Interventions:  · WALKS 1-1.5 MILES DAILY W/ WIFE. WIFE WAS RN, COOKS HEALTHY MEALS. GOES TO DENTIST Q6 MONTHS    Hearing/Vision:  No exam data present  Hearing/Vision  Do you or your family notice any trouble with your hearing that hasn't been managed with hearing aids?: (!) Yes  Do you have difficulty driving, watching TV, or doing any of your daily activities because of your eyesight?: (!) Yes  Have you had an eye exam within the past year?: Yes  Hearing/Vision Interventions:  · Hearing concerns:  PT WENT TO AUDIOLOGIST 3 MONTHS AGO, HAD EARS CLEANED, SOME IMPROVEMENT NOTED, NO PRESENT CONCERNS   · Vision concerns: PT CONCERNS FOR NIGHT DRIVING/RAIN DRIVING, FOLLOWS W/OPHTHALMOLOGIST, HX SHINGLES ON R EYE.  NOT INTERESTED IN CATARACT REMOVAL AT THIS TIME    Safety:  Safety  Do you have working smoke detectors?: Yes  Have all throw rugs been removed or fastened?: (!) No  Do you have non-slip mats or surfaces in all bathtubs/showers?: Yes  Do all of your stairways have a railing or banister?: (!) No  Are your doorways, halls and stairs free of clutter?: Yes  Do you always fasten your seatbelt when you are in a car?: Yes  Safety Interventions:  · Home safety tips provided     Personalized Preventive Plan   Current Health Maintenance Status  Immunization History   Administered Date(s) Administered    COVID-19, Craig Peter, PF, 30mcg/0.3mL 01/26/2021, 02/16/2021    Influenza Virus Vaccine 11/05/2008, 09/17/2009, 10/01/2010, 12/07/2011, 10/03/2012, 11/20/2013, 10/08/2014, 09/30/2015, 09/28/2016    Influenza, High Dose (Fluzone 65 yrs and older) 12/07/2011, 10/03/2012, 11/20/2013, 10/08/2014, 09/30/2015, 09/28/2016, 10/04/2017, 09/07/2018    Influenza, Quadv, adjuvanted, 65 yrs +, IM, PF (Fluad) 09/30/2021    Influenza, Triv, inactivated, subunit, adjuvanted, IM (Fluad 65 yrs and older) 09/11/2019, 09/18/2020    Pneumococcal Conjugate 13-valent ([id number]) 01/28/2015    Pneumococcal Polysaccharide ([id number]) 08/22/2007    Td, unspecified formulation 06/28/2000, 03/10/2010    Zoster Live (Zostavax) 10/10/2009        Health Maintenance   Topic Date Due    Shingles Vaccine (2 of 3) 12/05/2009    DTaP/Tdap/Td vaccine (1 - Tdap) 03/11/2010    Annual Wellness Visit (AWV)  Never done    Lipid screen  03/18/2022    Potassium monitoring  03/18/2022    Creatinine monitoring  03/18/2022    PSA counseling  03/18/2022    Flu vaccine  Completed    Pneumococcal 65+ years Vaccine  Completed    COVID-19 Vaccine  Completed    Hepatitis C screen  Completed    Hepatitis A vaccine  Aged Out    Hepatitis B vaccine  Aged Out    Hib vaccine  Aged Out    Meningococcal (ACWY) vaccine  Aged Out     Recommendations for [employer] Due: see orders and patient instructions/AVS.  .   Recommended screening schedule for the next 5-10 years is provided to the patient in written form: see Patient Instructions/AVS.    Vilma Maldonado was seen today for medicare aw. Diagnoses and all orders for this visit:    Routine general medical examination at a health care facility  -Health care topics addressed as above  -Recommended Tdap vaccinations. Influenza vaccine in office today.  -Continue medications as ordered  -Follow-up in 6 months for hypertension follow-up/fasting labs. Essential hypertension  -Controlled on current regimen. No changes today. Due for refill.  -     enalapril (VASOTEC) 20 MG tablet; One tablet twice daily for blood pressure  -     labetalol (NORMODYNE) 200 MG tablet; One tab twice daily  -     hydroCHLOROthiazide (HYDRODIURIL) 25 MG tablet; TAKE 1 TABLET EVERY DAY FOR BLOOD PRESSURE    Hypercholesteremia  -Controlled on current regimen. No changes today. Due for refill.  -     simvastatin (ZOCOR) 20 MG tablet; TAKE ONE TABLET EVERY EVENING FOR CHOLESTEROL    Arthritis of right ankle  -Controlled on current regimen. No changes today. Due for refill.  -     celecoxib (CELEBREX) 200 MG capsule;  Take 1 capsule by mouth daily    Need for influenza vaccination  -     INFLUENZA, QUADV, ADJUVANTED, 65 YRS =, IM, PF, PREFILL SYR, 0.5ML (FLUAD)

## 2021-09-30 NOTE — PROGRESS NOTES
Vaccine Information Sheet, \"Influenza - Inactivated\"  given to Charles Dior, or parent/legal guardian of  Charles Dior and verbalized understanding. Patient responses:    Have you ever had a reaction to a flu vaccine? No  Do you have any current illness? No  Have you ever had Guillian [place] Syndrome? No  Do you have a serious allergy to any of the follow: Neomycin, Polymyxin, Thimerosal, eggs or egg products? No    Flu vaccine given per order. Please see immunization tab. Risks and benefits explained. Current VIS given.       Immunizations Administered     Name Date Dose Route    Influenza, Quadv, adjuvanted, 65 yrs +, IM, PF (Fluad) 9/30/2021 0.5 mL Intramuscular    Site: Deltoid- Left    Lot: [id number]    NDC: 15372-924-19

## 2022-03-31 ENCOUNTER — OFFICE VISIT (OUTPATIENT)
Dept: FAMILY MEDICINE CLINIC | Age: 82
End: 2022-03-31
Payer: MEDICARE

## 2022-03-31 VITALS
BODY MASS INDEX: 30.46 KG/M2 | DIASTOLIC BLOOD PRESSURE: 74 MMHG | HEIGHT: 68 IN | WEIGHT: 201 LBS | HEART RATE: 76 BPM | OXYGEN SATURATION: 99 % | SYSTOLIC BLOOD PRESSURE: 128 MMHG

## 2022-03-31 DIAGNOSIS — Z85.46 HISTORY OF PROSTATE CANCER: ICD-10-CM

## 2022-03-31 DIAGNOSIS — R73.01 IMPAIRED FASTING GLUCOSE: ICD-10-CM

## 2022-03-31 DIAGNOSIS — M19.90 ARTHRITIS: ICD-10-CM

## 2022-03-31 DIAGNOSIS — R06.09 DOE (DYSPNEA ON EXERTION): ICD-10-CM

## 2022-03-31 DIAGNOSIS — I10 ESSENTIAL HYPERTENSION: ICD-10-CM

## 2022-03-31 DIAGNOSIS — I10 PRIMARY HYPERTENSION: Primary | ICD-10-CM

## 2022-03-31 DIAGNOSIS — E78.00 HYPERCHOLESTEREMIA: ICD-10-CM

## 2022-03-31 DIAGNOSIS — M19.079 ANKLE ARTHRITIS: ICD-10-CM

## 2022-03-31 DIAGNOSIS — I35.1 AORTIC VALVE INSUFFICIENCY, ETIOLOGY OF CARDIAC VALVE DISEASE UNSPECIFIED: ICD-10-CM

## 2022-03-31 DIAGNOSIS — E55.9 VITAMIN D DEFICIENCY: ICD-10-CM

## 2022-03-31 LAB
A/G RATIO: 2.4 (ref 1.1–2.2)
ALBUMIN SERPL-MCNC: 4.6 G/DL (ref 3.4–5)
ALP BLD-CCNC: 131 U/L (ref 40–129)
ALT SERPL-CCNC: 18 U/L (ref 10–40)
ANION GAP SERPL CALCULATED.3IONS-SCNC: 14 MMOL/L (ref 3–16)
AST SERPL-CCNC: 23 U/L (ref 15–37)
BILIRUB SERPL-MCNC: 0.9 MG/DL (ref 0–1)
BUN BLDV-MCNC: 26 MG/DL (ref 7–20)
CALCIUM SERPL-MCNC: 9.9 MG/DL (ref 8.3–10.6)
CHLORIDE BLD-SCNC: 99 MMOL/L (ref 99–110)
CHOLESTEROL, TOTAL: 139 MG/DL (ref 0–199)
CO2: 25 MMOL/L (ref 21–32)
CREAT SERPL-MCNC: 1.1 MG/DL (ref 0.8–1.3)
GFR AFRICAN AMERICAN: >60
GFR NON-AFRICAN AMERICAN: >60
GLUCOSE BLD-MCNC: 115 MG/DL (ref 70–99)
HDLC SERPL-MCNC: 58 MG/DL (ref 40–60)
LDL CHOLESTEROL CALCULATED: 67 MG/DL
POTASSIUM SERPL-SCNC: 4.3 MMOL/L (ref 3.5–5.1)
PROSTATE SPECIFIC ANTIGEN: <0.01 NG/ML (ref 0–4)
SODIUM BLD-SCNC: 138 MMOL/L (ref 136–145)
TOTAL PROTEIN: 6.5 G/DL (ref 6.4–8.2)
TRIGL SERPL-MCNC: 69 MG/DL (ref 0–150)
VITAMIN D 25-HYDROXY: 36.5 NG/ML
VLDLC SERPL CALC-MCNC: 14 MG/DL

## 2022-03-31 PROCEDURE — 93000 ELECTROCARDIOGRAM COMPLETE: CPT | Performed by: FAMILY MEDICINE

## 2022-03-31 PROCEDURE — G8484 FLU IMMUNIZE NO ADMIN: HCPCS | Performed by: FAMILY MEDICINE

## 2022-03-31 PROCEDURE — G8417 CALC BMI ABV UP PARAM F/U: HCPCS | Performed by: FAMILY MEDICINE

## 2022-03-31 PROCEDURE — G8427 DOCREV CUR MEDS BY ELIG CLIN: HCPCS | Performed by: FAMILY MEDICINE

## 2022-03-31 PROCEDURE — 36415 COLL VENOUS BLD VENIPUNCTURE: CPT | Performed by: FAMILY MEDICINE

## 2022-03-31 PROCEDURE — 1123F ACP DISCUSS/DSCN MKR DOCD: CPT | Performed by: FAMILY MEDICINE

## 2022-03-31 PROCEDURE — 1036F TOBACCO NON-USER: CPT | Performed by: FAMILY MEDICINE

## 2022-03-31 PROCEDURE — 99214 OFFICE O/P EST MOD 30 MIN: CPT | Performed by: FAMILY MEDICINE

## 2022-03-31 PROCEDURE — 4040F PNEUMOC VAC/ADMIN/RCVD: CPT | Performed by: FAMILY MEDICINE

## 2022-03-31 RX ORDER — HYDROCHLOROTHIAZIDE 25 MG/1
TABLET ORAL
Qty: 90 TABLET | Refills: 3 | Status: ON HOLD | OUTPATIENT
Start: 2022-03-31 | End: 2022-07-02 | Stop reason: HOSPADM

## 2022-03-31 RX ORDER — SIMVASTATIN 20 MG
TABLET ORAL
Qty: 90 TABLET | Refills: 3 | Status: ON HOLD | OUTPATIENT
Start: 2022-03-31 | End: 2022-07-01 | Stop reason: ALTCHOICE

## 2022-03-31 RX ORDER — LABETALOL 200 MG/1
TABLET, FILM COATED ORAL
Qty: 180 TABLET | Refills: 3 | Status: ON HOLD | OUTPATIENT
Start: 2022-03-31 | End: 2022-07-02 | Stop reason: HOSPADM

## 2022-03-31 RX ORDER — ENALAPRIL MALEATE 20 MG/1
TABLET ORAL
Qty: 180 TABLET | Refills: 3 | Status: SHIPPED | OUTPATIENT
Start: 2022-03-31 | End: 2022-09-02

## 2022-03-31 ASSESSMENT — PATIENT HEALTH QUESTIONNAIRE - PHQ9
SUM OF ALL RESPONSES TO PHQ9 QUESTIONS 1 & 2: 0
SUM OF ALL RESPONSES TO PHQ QUESTIONS 1-9: 0
1. LITTLE INTEREST OR PLEASURE IN DOING THINGS: 0
SUM OF ALL RESPONSES TO PHQ QUESTIONS 1-9: 0
2. FEELING DOWN, DEPRESSED OR HOPELESS: 0

## 2022-03-31 NOTE — PROGRESS NOTES
[facility] Family Medicine  Clinic Note    Date: 3/31/2022                                               Subjective:     Chief Complaint   Patient presents with    Hypertension     HTNR OUTINE FOLLOW UP      HPI    RIGHT ANKLE TIGHTENING UP MORE - MORE PAIN - LIMITING WALKING  TOLERATES BIKING BETTER - STIFFNESS NOW INTO RIGHT KNEE  KNOWN OA W/ SOME SWELLING RIGHT ANKLE  GETS A LITTLE WINDED AFTER FIRST QUARTER MILE, BUT GOES AWAY SOON AFTER - [address] MORE WINDED W/ ACTIVITY - LIMITED ACTIVITY OVER WINTER - TRYING TO INCREASE ACTIVITY BUT MORE LIMITED. WALKS 1.5 MILES MOST DAYS WHEN WEATHER NICE. CELEBREX NOT HELPFUL ON IT FOR A YEAR.   CORTISONE INJECTION  URINE FLOW GOOD    BP Readings from Last 3 Encounters:   03/31/22 128/74   09/30/21 114/70   08/25/21 118/68     Pulse Readings from Last 3 Encounters:   03/31/22 76   09/30/21 72   08/25/21 74     Wt Readings from Last 3 Encounters:   03/31/22 201 lb (91.2 kg)   09/30/21 205 lb (93 kg)   08/25/21 206 lb (93.4 kg)     On vit d for low D in past 2000  Hx of prostate ca  Fasting for labs  NO SMOKING  HAD SEEDS XRT  HAD COVID BOOSTER       Patient Active Problem List    Diagnosis Date Noted    HTN (hypertension) 05/16/2011    Hypercholesteremia 05/16/2011    Aortic regurgitation     Impaired fasting glucose 05/16/2011    Environmental allergies     Sensorineural hearing loss, bilateral 08/29/2019    Prediabetes 03/21/2019    Arthritis of right ankle 03/07/2018    Ankle arthritis, right 03/21/2017    Dupuytren's contracture 10/26/2016     Past Medical History:   Diagnosis Date    Aortic regurgitation 01/01/2009    Arthritis     Pt says in his right ankle    Environmental allergies     Heart murmur     Hypercholesterolemia     Hypertension     Impaired fasting glucose     Prostate cancer ([facility] [address] 01/01/2006    Samuel     Past Surgical History:   Procedure Laterality Date    COLONOSCOPY  01/2017    Repeat 5 years    HAND SURGERY Right 2012    OTHER SURGICAL HISTORY  2008    RADIOACTIVE SEED IMPLANTS FOR PROSTATE CANCER    PROSTATE SURGERY  2007    seed implant Asad Oliva)     Office Visit on 03/18/2021   Component Date Value Ref Range Status    Vit D, 25-Hydroxy 03/18/2021 41.7  >=30 ng/mL Final    Comment: <=20 ng/mL. ........... Lillian Gin Deficient  21-29 ng/mL. ......... Lillian Gin Insufficient  >=30 ng/mL. ........ Lillian Gin Sufficient      PSA 03/18/2021 <0.01  0.00 - 4.00 ng/mL Final    Hemoglobin A1C 03/18/2021 5.8  See comment % Final    Comment: Comment:  Diagnosis of Diabetes: > or = 6.5%  Increased risk of diabetes (Prediabetes): 5.7-6.4%  Glycemic Control: Nonpregnant Adults: <7.0%                    Pregnant: <6.0%        eAG 03/18/2021 119.8  mg/dL Final    Cholesterol, Total 03/18/2021 143  0 - 199 mg/dL Final    Triglycerides 03/18/2021 59  0 - 150 mg/dL Final    HDL 03/18/2021 55  40 - 60 mg/dL Final    LDL Calculated 03/18/2021 76  <100 mg/dL Final    VLDL Cholesterol Calculated 03/18/2021 12  Not Established mg/dL Final    Sodium 03/18/2021 139  136 - 145 mmol/L Final    Potassium 03/18/2021 4.6  3.5 - 5.1 mmol/L Final    Chloride 03/18/2021 101  99 - 110 mmol/L Final    CO2 03/18/2021 30  21 - 32 mmol/L Final    Anion Gap 03/18/2021 8  3 - 16 Final    Glucose 03/18/2021 113* 70 - 99 mg/dL Final    BUN 03/18/2021 26* 7 - 20 mg/dL Final    CREATININE 03/18/2021 0.9  0.8 - 1.3 mg/dL Final    GFR Non- 03/18/2021 >60  >60 Final    Comment: >60 mL/min/1.73m2 EGFR, calc. for ages 25 and older using the  MDRD formula (not corrected for weight), is valid for stable  renal function.  GFR  03/18/2021 >60  >60 Final    Comment: Chronic Kidney Disease: less than 60 ml/min/1.73 sq.m. Kidney Failure: less than 15 ml/min/1.73 sq.m. Results valid for patients 18 years and older.       Calcium 03/18/2021 9.5  8.3 - 10.6 mg/dL Final    Total Protein 03/18/2021 6.7  6.4 - 8.2 g/dL Final    Albumin 03/18/2021 4.2  3.4 - 5.0 g/dL Final    Albumin/Globulin Ratio 03/18/2021 1.7  1.1 - 2.2 Final    Total Bilirubin 03/18/2021 0.9  0.0 - 1.0 mg/dL Final    Alkaline Phosphatase 03/18/2021 74  40 - 129 U/L Final    ALT 03/18/2021 17  10 - 40 U/L Final    AST 03/18/2021 23  15 - 37 U/L Final    Globulin 03/18/2021 2.5  g/dL Final     Family History   Problem Relation Age of Onset    Cancer Mother         breast    Heart Disease Father 77        MI    Diabetes Paternal Grandmother      Current Outpatient Medications   Medication Sig Dispense Refill    enalapril (VASOTEC) 20 MG tablet One tablet twice daily for blood pressure 180 tablet 3    labetalol (NORMODYNE) 200 MG tablet One tab twice daily 180 tablet 3    celecoxib (CELEBREX) 200 MG capsule Take 1 capsule by mouth daily 90 capsule 3    simvastatin (ZOCOR) 20 MG tablet TAKE ONE TABLET EVERY EVENING FOR CHOLESTEROL 90 tablet 3    hydroCHLOROthiazide (HYDRODIURIL) 25 MG tablet TAKE 1 TABLET EVERY DAY FOR BLOOD PRESSURE 90 tablet 3    albuterol sulfate HFA (VENTOLIN HFA) 108 (90 Base) MCG/ACT inhaler Inhale 2 puffs into the lungs 4 times daily as needed for Wheezing 1 Inhaler 0    Omega-3 Fatty Acids (FISH OIL) 1200 MG CAPS Take 1 capsule by mouth daily.  latanoprost (XALATAN) 0.005 % ophthalmic solution Place 1 drop into both eyes nightly.  Cholecalciferol (VITAMIN D) 2000 UNITS CAPS capsule 1 capsule daily. With food      Niacin 1000 MG TBCR 1,000 mg daily. No current facility-administered medications for this visit.      No Known Allergies    Review of Systems    Objective:  /74 (Site: Left Upper Arm, Position: Sitting, Cuff Size: Medium Adult)   Pulse 76   Ht 5' 8\" (1.727 m)   Wt 201 lb (91.2 kg)   SpO2 99%   BMI 30.56 kg/m²     BP Readings from Last 3 Encounters:   03/31/22 128/74   09/30/21 114/70   08/25/21 118/68       Pulse Readings from Last 3 Encounters:   03/31/22 76   09/30/21 72   08/25/21 74       Wt Readings from Last 3 Encounters: 03/31/22 201 lb (91.2 kg)   09/30/21 205 lb (93 kg)   08/25/21 206 lb (93.4 kg)       Physical Exam    Assessment/Plan:      Diagnosis Orders   1. Primary hypertension  Comprehensive Metabolic Panel   2. Hypercholesteremia  Lipid Panel   3. Impaired fasting glucose  Hemoglobin A1C   4. Arthritis     5. History of prostate cancer  PSA, Prostatic Specific Antigen   6. Vitamin D deficiency  Vitamin D 25 Hydroxy   7. Aortic valve insufficiency, etiology of cardiac valve disease unspecified  Echocardiogram complete   8. MUKHERJEE (dyspnea on exertion)  Echocardiogram complete   9.  Ankle arthritis  Juan Armijo MD, Orthopedic Surgery (Foot, Ankle), [facility]     TDAP/ SHINGLES AT PHARMACY  COVID 2ND BOOSTER DW/ PT IF SURGE O/W WAIT TIL CLOSER TIL FALL  ekg   Echo for AR w/ mukherjee  Exercise as tolerated  Refer ortho for eval  Diclofenac gel and tylenol prn - stop celebrex as not helpful  Fasting labs today  Cont statin pending labs  Diet/ activity dw/ pt  F/u eye doctor routinely for iop-on gtts  Vit d 2000/d pending labs  BP CONTROL GOOD - vasotec, normodyne, hctz    Sandeep Preston MD, MD  3/31/2022  7:40 AM

## 2022-04-01 LAB
ESTIMATED AVERAGE GLUCOSE: 125.5 MG/DL
HBA1C MFR BLD: 6 %

## 2022-04-07 ENCOUNTER — OFFICE VISIT (OUTPATIENT)
Dept: ORTHOPEDIC SURGERY | Age: 82
End: 2022-04-07
Payer: MEDICARE

## 2022-04-07 VITALS — HEIGHT: 68 IN | BODY MASS INDEX: 31.01 KG/M2 | WEIGHT: 204.6 LBS

## 2022-04-07 DIAGNOSIS — M19.071 ARTHRITIS OF RIGHT ANKLE: Primary | ICD-10-CM

## 2022-04-07 PROCEDURE — 1123F ACP DISCUSS/DSCN MKR DOCD: CPT | Performed by: ORTHOPAEDIC SURGERY

## 2022-04-07 PROCEDURE — G8427 DOCREV CUR MEDS BY ELIG CLIN: HCPCS | Performed by: ORTHOPAEDIC SURGERY

## 2022-04-07 PROCEDURE — 99203 OFFICE O/P NEW LOW 30 MIN: CPT | Performed by: ORTHOPAEDIC SURGERY

## 2022-04-07 PROCEDURE — 4040F PNEUMOC VAC/ADMIN/RCVD: CPT | Performed by: ORTHOPAEDIC SURGERY

## 2022-04-07 PROCEDURE — 1036F TOBACCO NON-USER: CPT | Performed by: ORTHOPAEDIC SURGERY

## 2022-04-07 PROCEDURE — G8417 CALC BMI ABV UP PARAM F/U: HCPCS | Performed by: ORTHOPAEDIC SURGERY

## 2022-04-07 RX ORDER — NAPROXEN 500 MG/1
500 TABLET ORAL 2 TIMES DAILY WITH MEALS
Qty: 60 TABLET | Refills: 0 | Status: SHIPPED | OUTPATIENT
Start: 2022-04-07 | End: 2022-06-16

## 2022-04-07 NOTE — PROGRESS NOTES
CHIEF COMPLAINT: Right ankle pain/ advanced arthritis. HISTORY:  Mr. Cecilia Ferris 80 y.o.  male presents today for the first visit for evaluation of right ankle pain which started to gradually worsen over time but significantly over the past 6 months.  He is complaining of achy  Pain. Alleviating factors:  elevation and rest. Pain is increase with standing and walking and shoe wear. Pain is sharp with first few steps, dull achy pain by the end of the day. He rates his pain a 7/10 VAS. No radiation and no numbness and tingling sensation. No other complaint. 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 He did have a cortisone injection in his right ankle on 6/6/2017 and stated he only received a few days relief. He cares for his wife who has Parkinson's and they try to walk a lot. His pain is worse after walking more than 1 and half miles. He currently uses Voltaren gel with minimal to no improvement. Denies smoking. He is retired.     Past Medical History:   Diagnosis Date    Aortic regurgitation 01/01/2009    Arthritis     Pt says in his right ankle    Environmental allergies     Heart murmur     Hypercholesterolemia     Hypertension     Impaired fasting glucose     Prostate cancer ([facility] [address] 01/01/2006    Samuel       Past Surgical History:   Procedure Laterality Date    COLONOSCOPY  01/2017    Repeat 5 years    HAND SURGERY Right 2012    OTHER SURGICAL HISTORY  2008    RADIOACTIVE SEED IMPLANTS FOR PROSTATE CANCER    PROSTATE SURGERY  2007    seed implant [facility])       Social History     Socioeconomic History    Marital status:      Spouse name: Not on file    Number of children: Not on file    Years of education: Not on file    Highest education level: Not on file   Occupational History    Not on file   Tobacco Use    Smoking status: Never Smoker    Smokeless tobacco: Never Used    Tobacco comment: counselled to never start   Vaping Use    Vaping Use: Never used   Substance and Sexual Activity    Alcohol use: No     Comment: RARE    Drug use: No    Sexual activity: Yes     Partners: Female   Other Topics Concern    Not on file   Social History Narrative    Not on file     Social Determinants of Health     Financial Resource Strain: Low Risk     Difficulty of Paying Living Expenses: Not hard at all   Food Insecurity: No Food Insecurity    Worried About [address] in the Last Year: Never true    [facility] in the Last Year: Never true   Transportation Needs: No Transportation Needs    Lack of Transportation (Medical): No    Lack of Transportation (Non-Medical):  No   Physical Activity:     Days of Exercise per Week: Not on file    Minutes of Exercise per Session: Not on file   Stress:     Feeling of Stress : Not on file   Social Connections:     Frequency of Communication with Friends and Family: Not on file    Frequency of Social Gatherings with Friends and Family: Not on file    Attends Sabianist Services: Not on file    Active Member of [address] [place] [address] or Organizations: Not on file    Attends Club or Organization Meetings: Not on file    Marital Status: Not on file   Intimate Partner Violence:     Fear of Current or Ex-Partner: Not on file    Emotionally Abused: Not on file    Physically Abused: Not on file    Sexually Abused: Not on file   Housing Stability:     Unable to Pay for Housing in the Last Year: Not on file    Number of [place] in the Last Year: Not on file    Unstable Housing in the Last Year: Not on file       Family History   Problem Relation Age of Onset    Cancer Mother         breast    Heart Disease Father 77        MI    Diabetes Paternal Grandmother        Current Outpatient Medications on File Prior to Visit   Medication Sig Dispense Refill    enalapril (VASOTEC) 20 MG tablet One tablet twice daily for blood pressure 180 tablet 3    labetalol (NORMODYNE) 200 MG tablet One tab twice daily 180 tablet 3    simvastatin (ZOCOR) 20 MG tablet TAKE ONE TABLET EVERY EVENING FOR CHOLESTEROL 90 tablet 3    hydroCHLOROthiazide (HYDRODIURIL) 25 MG tablet TAKE 1 TABLET EVERY DAY FOR BLOOD PRESSURE 90 tablet 3    albuterol sulfate HFA (VENTOLIN HFA) 108 (90 Base) MCG/ACT inhaler Inhale 2 puffs into the lungs 4 times daily as needed for Wheezing 1 Inhaler 0    Omega-3 Fatty Acids (FISH OIL) 1200 MG CAPS Take 1 capsule by mouth daily.  latanoprost (XALATAN) 0.005 % ophthalmic solution Place 1 drop into both eyes nightly.  Cholecalciferol (VITAMIN D) 2000 UNITS CAPS capsule 1 capsule daily. With food      Niacin 1000 MG TBCR 1,000 mg daily. No current facility-administered medications on file prior to visit. Pertinent items are noted in HPI  Review of systems reviewed from Patient History Form and available in the patient's chart under the Media tab. PHYSICAL EXAMINATION:  Mr. Erika Phillips is a very pleasant 80 y.o.  male who presents today in no acute distress, awake, alert, and oriented. He is well dressed, nourished and  groomed. Patient with normal affect. Height is  5' 8\" (1.727 m), weight is 204 lb 9.6 oz (92.8 kg), Body mass index is 31.11 kg/m². Resting respiratory rate is 16. Examination of the gait, showed that the patient walks heel-toe with minimal limp.  Examination of right ankle showing decrease ROM compare to the other side. He has intact sensation and good pedal pulses.  He has good strength in all four planes, including eversion, and has moderate tenderness on deep palpation over the medial ankle and mild tenderness over the subtalar joint line, with mild swelling compared to the other side.  The ankles are stable to drawer test bilaterally, equally.      IMAGING:  Xray's were reviewed,  3 views of the right ankle taken in office today, and showed no acute fracture. Significant arthritis affecting the ankle joint and moderate arthritis affecting the subtalar. IMPRESSION: Right ankle and subtalar arthritis. PLAN: I discussed with the patient the treatment options and at the ankle and subtalar arthritis are worsening since previous. We recommended stretching exercises of the calf which was taught to the patient today. He will take NSAIDS Naprosyn, Rx sent and instructed in care. I believe he will benefit from cortisone injection right ankle joint, but he would like to hold off on any injections or surgery at this time. Follow-up as needed. He is aware that he may need ankle fusion in the future if his pain does not improve.         Chery Brenner MD

## 2022-04-28 ENCOUNTER — HOSPITAL ENCOUNTER (OUTPATIENT)
Dept: NON INVASIVE DIAGNOSTICS | Age: 82
Discharge: HOME | End: 2022-04-28
Payer: MEDICARE

## 2022-04-28 DIAGNOSIS — R06.09 DOE (DYSPNEA ON EXERTION): ICD-10-CM

## 2022-04-28 DIAGNOSIS — I35.1 AORTIC VALVE INSUFFICIENCY, ETIOLOGY OF CARDIAC VALVE DISEASE UNSPECIFIED: ICD-10-CM

## 2022-04-28 LAB
LV EF: 58 %
LVEF MODALITY: NORMAL

## 2022-04-28 PROCEDURE — 93306 TTE W/DOPPLER COMPLETE: CPT

## 2022-04-29 DIAGNOSIS — I35.0 SEVERE AORTIC STENOSIS: Primary | ICD-10-CM

## 2022-05-27 NOTE — PROGRESS NOTES
[address]  H+P  Consult  OP Visit  FU Visit   CC HX HPI   GEN  Doing well. Has noted increased sob with exertion. AS  Ø CP, dizziness. Progressive sob. HTN  Ambulatory BP in good range. Ø HA/dizziness. CHOL  Last lipid reviewed. On max dose/tolerated statin. MED  Compliant with CV meds. Ø reported SA. HISTORY/ALLERGY/ROS   MEDHx  has a past medical history of Aortic regurgitation, Arthritis, Environmental allergies, Heart murmur, Hypercholesterolemia, Hypertension, Impaired fasting glucose, and Prostate cancer ([facility] [address] SURGHx  has a past surgical history that includes other surgical history (2008); Prostate surgery (2007); Colonoscopy (01/2017); and Hand surgery (Right, 2012). SOCHx  reports that he has never smoked. He has never used smokeless tobacco. He reports that he does not drink alcohol and does not use drugs. FAMHx family history includes Cancer in his mother; Diabetes in his paternal grandmother; Heart Disease (age of onset: 77) in his father. ALLERG Patient has no known allergies. ROS Full ROS obtained and negative except as mentioned in HPI   MEDICATIONS   Current Outpatient Medications   Medication Sig Dispense Refill    naproxen (NAPROSYN) 500 MG tablet Take 1 tablet by mouth 2 times daily (with meals) 60 tablet 0    enalapril (VASOTEC) 20 MG tablet One tablet twice daily for blood pressure 180 tablet 3    labetalol (NORMODYNE) 200 MG tablet One tab twice daily 180 tablet 3    simvastatin (ZOCOR) 20 MG tablet TAKE ONE TABLET EVERY EVENING FOR CHOLESTEROL 90 tablet 3    hydroCHLOROthiazide (HYDRODIURIL) 25 MG tablet TAKE 1 TABLET EVERY DAY FOR BLOOD PRESSURE 90 tablet 3    albuterol sulfate HFA (VENTOLIN HFA) 108 (90 Base) MCG/ACT inhaler Inhale 2 puffs into the lungs 4 times daily as needed for Wheezing 1 Inhaler 0    Omega-3 Fatty Acids (FISH OIL) 1200 MG CAPS Take 1 capsule by mouth daily.       latanoprost (XALATAN) 0.005 % ophthalmic solution Place 1 drop into both eyes nightly.  Cholecalciferol (VITAMIN D) 2000 UNITS CAPS capsule 1 capsule daily. With food      Niacin 1000 MG TBCR 1,000 mg daily. No current facility-administered medications for this visit. PHYSICAL EXAM   Vitals Vitals:    06/02/22 0846   BP: 136/70   Pulse: 73   SpO2: 98%      Gen Alert, coop, no distress Heart  Rrr, 3/6   Head NC, AT, no abnorm Abd  Soft, NT, +BS, no mass, no OM   Eyes PER, conj/corn clear Ext  Ext nl, AT, no C/C/E   Nose Nares nl, no drain, NT Pulse 2+ and symmetric   Throat Lips, mucosa, tongue nl Skin Col/text/turg nl, no vis rash/les   Neck S/S, TM, NT, no bruit/JVD Psych Nl mood and affect   Lung CTA-B, unlabored, no DTP Lymph   No cervical or axillary LA   Ch wall NT, no deform Neuro  Nl gross M/S exam      ASSESSMENT AND PLAN   *AS    Date EF Detail   Sx     Sob with exertion   DATA   Most recent TTE, LHC reviewed personally   NYHA   II   TTE 6/14 4/22 55%  60% Mod AI  Severe AS MG 42, Mild to mod AI   LHC   None   Plan     TAVR workup   *HTN  Status Controlled, vitals and available ambulatory monitoring logs reviewed personally  Plan Counseled on diet/salt/exercise/weight, continue meds at doses above  *CHOL  Status controlled with last LDL of 67(goal <70) and HDL of 58(3/22), labs reviewed personally  Plan Counseled on diet/exercise/weight, continue high-intensity statin, lipid/liver surveillance per PCP  *COMPLIANCE  Status Compliant  Plan Discussed importance of compliance with meds/diet/salt/exercise; avoid tob/alc/drugs; patient verbalized understanding  *FOLLOWUP  After testing    Scribe Attestation  Arlene JORGE, am scribing for and in the presence of Argelia Robertson MD.   SignedArlene 05/27/22 10:28 AM   Provider Brooklynn Cordon is working as a scribe for and in the presence of me (Argelia Robertson MD).   Working as a scribe, Arlene Marcos may have prepopulated components of this note with my historical intellectual property under my direct supervision. Any additions to this intellectual property were performed in my presence and at my direction.   Furthermore, the content and accuracy of this note have been reviewed by me Ankit Logan MD).  6/2/2022 9:38 AM

## 2022-06-02 ENCOUNTER — TELEPHONE (OUTPATIENT)
Dept: CARDIOLOGY CLINIC | Age: 82
End: 2022-06-02

## 2022-06-02 ENCOUNTER — OFFICE VISIT (OUTPATIENT)
Dept: CARDIOLOGY CLINIC | Age: 82
End: 2022-06-02
Payer: MEDICARE

## 2022-06-02 VITALS
HEIGHT: 69 IN | DIASTOLIC BLOOD PRESSURE: 70 MMHG | OXYGEN SATURATION: 98 % | WEIGHT: 203.4 LBS | BODY MASS INDEX: 30.13 KG/M2 | SYSTOLIC BLOOD PRESSURE: 136 MMHG | HEART RATE: 73 BPM

## 2022-06-02 DIAGNOSIS — R42 DIZZINESS: ICD-10-CM

## 2022-06-02 DIAGNOSIS — I35.0 NONRHEUMATIC AORTIC VALVE STENOSIS: Primary | ICD-10-CM

## 2022-06-02 DIAGNOSIS — I10 ESSENTIAL HYPERTENSION: ICD-10-CM

## 2022-06-02 DIAGNOSIS — E78.00 HYPERCHOLESTEREMIA: ICD-10-CM

## 2022-06-02 PROCEDURE — 1036F TOBACCO NON-USER: CPT | Performed by: INTERNAL MEDICINE

## 2022-06-02 PROCEDURE — G8417 CALC BMI ABV UP PARAM F/U: HCPCS | Performed by: INTERNAL MEDICINE

## 2022-06-02 PROCEDURE — 1123F ACP DISCUSS/DSCN MKR DOCD: CPT | Performed by: INTERNAL MEDICINE

## 2022-06-02 PROCEDURE — 99204 OFFICE O/P NEW MOD 45 MIN: CPT | Performed by: INTERNAL MEDICINE

## 2022-06-02 PROCEDURE — G8427 DOCREV CUR MEDS BY ELIG CLIN: HCPCS | Performed by: INTERNAL MEDICINE

## 2022-06-02 NOTE — TELEPHONE ENCOUNTER
LVM to call to schedule CTA, Carotid & LHC w/DCE. Please transfer call to [address] [place] [address].

## 2022-06-02 NOTE — TELEPHONE ENCOUNTER
TAVR testing is scheduled for 6-21-22 per below:    8:00 am - [facility], [place] CTA CHEST ABD PELVIC W/CONTRAST  PREPS:  * NPO 4 hours prior to procedure  * Arrive 30 minutes prior to exam (7:30 am)  * No necklaces, underwire bras, body piercing, no pants with zippers, belts,or suspenders  * Bring a complete list of medications or the test cannot be completed. 8:30 am - CAROTID STUDY  * Please wear easy to remove clothing  * Please take all medication, unless otherwise instructed  * You will be here for approximately 1 hour      Lab work was drawn prior to 6-21-22.    7-1-22 / 7-8:30 am - [facility] w/DCE @ [facility], [place]  PREPS:  * Bring list of your medications. * Please notify us before the procedure if you are allergic to anything; especially x-ray contrast dye, iodine, nickel, or any type of jewlry. This is very important! * Do no eat or drink anthing at all after midnight (or 8 hours) prior to the procedure. * Take all morning medications EXCEPT any diuretics (water pills) the day of the procedure with a small amount of water. * If you are on Coumadin, Warfarin, or Keke Ill, please notify us so that we can make adjustments to the medications. * If you are on Xarelto, Eliquis, or Pradaxa please stop taking these medications 3 days prior to the procedure(including the day of the procedure). * If you brendan insulin in the morning, check your blood sugar the morning of your procedure. If your blood sugar is 120 or less, do not take insulin. If your blood suger is more than 120, take half the dose of your normal insulin. No matter what your blood sugar level is, take all oral diabetic medications. * You MUST have someone to drive you home - no driving for 24 hours after your procedure. If an intervention is performed, you might stay overnight in the hospital.   * Discharge instructions will be given to you at the time of your procedure.

## 2022-06-08 DIAGNOSIS — I10 ESSENTIAL HYPERTENSION: ICD-10-CM

## 2022-06-08 DIAGNOSIS — I35.0 NONRHEUMATIC AORTIC VALVE STENOSIS: ICD-10-CM

## 2022-06-08 LAB
BUN BLDV-MCNC: 26 MG/DL (ref 7–20)
CREAT SERPL-MCNC: 1 MG/DL (ref 0.8–1.3)
GFR AFRICAN AMERICAN: >60
GFR NON-AFRICAN AMERICAN: >60

## 2022-06-13 ENCOUNTER — CLINICAL DOCUMENTATION (OUTPATIENT)
Dept: AUDIOLOGY | Age: 82
End: 2022-06-13

## 2022-06-13 ENCOUNTER — PROCEDURE VISIT (OUTPATIENT)
Dept: AUDIOLOGY | Age: 82
End: 2022-06-13
Payer: MEDICARE

## 2022-06-13 ENCOUNTER — OFFICE VISIT (OUTPATIENT)
Dept: ENT CLINIC | Age: 82
End: 2022-06-13
Payer: MEDICARE

## 2022-06-13 VITALS — SYSTOLIC BLOOD PRESSURE: 127 MMHG | HEART RATE: 75 BPM | DIASTOLIC BLOOD PRESSURE: 70 MMHG | TEMPERATURE: 98 F

## 2022-06-13 DIAGNOSIS — H61.23 BILATERAL IMPACTED CERUMEN: ICD-10-CM

## 2022-06-13 DIAGNOSIS — H90.3 SENSORINEURAL HEARING LOSS (SNHL) OF BOTH EARS: Primary | ICD-10-CM

## 2022-06-13 DIAGNOSIS — H90.A21 SENSORINEURAL HEARING LOSS (SNHL) OF RIGHT EAR WITH RESTRICTED HEARING OF LEFT EAR: Primary | ICD-10-CM

## 2022-06-13 DIAGNOSIS — H91.8X9 ASYMMETRICAL HEARING LOSS: ICD-10-CM

## 2022-06-13 PROCEDURE — 1036F TOBACCO NON-USER: CPT | Performed by: STUDENT IN AN ORGANIZED HEALTH CARE EDUCATION/TRAINING PROGRAM

## 2022-06-13 PROCEDURE — G8427 DOCREV CUR MEDS BY ELIG CLIN: HCPCS | Performed by: STUDENT IN AN ORGANIZED HEALTH CARE EDUCATION/TRAINING PROGRAM

## 2022-06-13 PROCEDURE — 1123F ACP DISCUSS/DSCN MKR DOCD: CPT | Performed by: STUDENT IN AN ORGANIZED HEALTH CARE EDUCATION/TRAINING PROGRAM

## 2022-06-13 PROCEDURE — 99214 OFFICE O/P EST MOD 30 MIN: CPT | Performed by: STUDENT IN AN ORGANIZED HEALTH CARE EDUCATION/TRAINING PROGRAM

## 2022-06-13 PROCEDURE — G8417 CALC BMI ABV UP PARAM F/U: HCPCS | Performed by: STUDENT IN AN ORGANIZED HEALTH CARE EDUCATION/TRAINING PROGRAM

## 2022-06-13 PROCEDURE — 69210 REMOVE IMPACTED EAR WAX UNI: CPT | Performed by: STUDENT IN AN ORGANIZED HEALTH CARE EDUCATION/TRAINING PROGRAM

## 2022-06-13 PROCEDURE — 92557 COMPREHENSIVE HEARING TEST: CPT | Performed by: AUDIOLOGIST

## 2022-06-13 PROCEDURE — 92567 TYMPANOMETRY: CPT | Performed by: AUDIOLOGIST

## 2022-06-13 NOTE — PROGRESS NOTES
[facility]  Division of Audiology/Otolaryngology    6/13/2022     Patient name: Yousif Layne  Primary Care Physician: Justice Iverson MD   Medical Record Number: [id number]     Yousif Layne   1940, 80 y.o. male   [id number]       Referring Provider: Rafa Pizano DO  Referral Type: In an order in [address] [place] [address]    Reason for Visit: Evaluation of the cause of disorders of hearing, tinnitus, or balance. ADULT AUDIOLOGIC EVALUATION                    Yousif Layne is a 80 y.o. male seen today, 6/13/2022 , for a same-day comprehensive audiologic evaluation. Patient was seen by Rafa Pizano DO following today's evaluation. AUDIOLOGIC AND OTHER PERTINENT MEDICAL HISTORY:      Yousif Layne noted history of hearing loss. Has trouble with clarity of speech while conversing with spouse and others. Medical history is reportedly significant for cardiovascular complications. Denied otalgia, aural fullness, otorrhea, tinnitus, dizziness, imbalance, history of falls, history of head trauma and history of ear surgery. IMPRESSIONS:      Today's results are consistent with bilateral sensorineural hearing loss that is asymmetrical in right ear, with excellent word recognition for both ears. Hearing loss is significant enough to result in difficulty understanding speech in most listening environments. Recommended hearing aid evaluation. Patient to follow medical recommendations per  Rafa Pizano DO.    ASSESSMENT AND FINDINGS:     Otoscopy revealed: Unable to visualzie tympanic membrane bilaterally; narrow ear canal, AU. Reliability: Good   Transducer: Inserts    RIGHT EAR:  Hearing Sensitivity: Moderate to severe sensorineural hearing loss; isolated 20 dB air-bone gap at 4K.   Speech Recognition Threshold: 40 dB HL  Word Recognition: Excellent (%), based on NU-6   Tympanometry: Type A tympanogram, consistent with normal middle ear function  Acoustic Reflexes: Ipsilateral: elevated and absent at isolated frequencies     LEFT EAR:  Hearing Sensitivity: Mild to severe sensorineural hearing loss; isolated 15 dB air-bone gap at [employer] Recognition Threshold: 35 dB HL  Word Recognition: Excellent (%), based on NU-6   Tympanometry: Type A-d tympanogram, consistent with hypermobile tympanic membrane  Acoustic Reflexes: Ipsilateral: present          COUNSELING:      Reviewed purpose of testing completed today, general auditory system function, and results obtained today. The following items are recommended based on patient report and results from today's appointment:   - Continue medical follow-up with Adi Watson DO.   - Retest hearing as medically indicated and/or sooner if a change in hearing is noted. - If desired, schedule a Hearing Aid Evaluation (HAE) appointment to discuss hearing aid options. Patient has interest in amplification; will verify insurance benefits. Chart CC'd to: Adi Watson DO      Degree of   Hearing Sensitivity dB Range   Within Normal Limits (WNL) 0 - 20   Mild 20 - 40   Moderate 40 - 55   Moderately-Severe 55 - 70   Severe 70 - 90   Profound 90 +        RECOMMENDATIONS:        Adi Watson DO to medically advise.     Joanna Ruelas  Audiologist    Electronically signed by Joanna Ruelas on 6/13/22 at 9:43 AM.

## 2022-06-13 NOTE — PROGRESS NOTES
Patients insurance benefit does not hold coverage for hearing devices. Reference number of call is:  [facility] B6-

## 2022-06-13 NOTE — PROGRESS NOTES
[address] VISIT      Patient Name: Negin West  Medical Record Number:  [id number]  Primary Care Physician:  Kailey Duggan MD    ChiefComplaint     Chief Complaint   Patient presents with   [place]k [place] Ear Problem     ear cleaning , having trouble hearing had hearing test       History of Present Illness     Negin West is an 80 y.o. male previously seen for bilateral cerumen impaction and conductive hearing loss, last seen by Dr. Scott Saucedo on 8/2/2021. Interval History:   Chronic Hearing loss, bilateral.  His wife has been telling him that she feels like his hearing is worsening. Patient states he has a \"little bit\" of gradual hearing loss. Wife has parkinson's and dosent move lips as well and has hard time talking loud. Denies tinnitus. No otalgia. No otorrhea. No history of chronic ear infections. No history of otologic surgery. No family history of early onset hearing loss. No loud noise exposures. Denies exposure to chemotherapy. Denies vertigo or dizziness.      Past Medical History     Past Medical History:   Diagnosis Date    Aortic regurgitation 01/01/2009    Arthritis     Pt says in his right ankle    Environmental allergies     Heart murmur     Hypercholesterolemia     Hypertension     Impaired fasting glucose     Prostate cancer ([address] 01/01/2006    Samuel       Past Surgical History     Past Surgical History:   Procedure Laterality Date    COLONOSCOPY  01/2017    Repeat 5 years    HAND SURGERY Right 2012    OTHER SURGICAL HISTORY  2008    RADIOACTIVE SEED IMPLANTS FOR PROSTATE CANCER    PROSTATE SURGERY  2007    seed implant [facility]any Reis)       Family History     Family History   Problem Relation Age of Onset    Cancer Mother         breast    Heart Disease Father 77        MI    Diabetes Paternal Grandmother        Social History     Social History     Tobacco Use    Smoking status: Never Smoker    Smokeless tobacco: Never Used    Tobacco comment: counselled to never start   Vaping Use    Vaping Use: Never used   Substance Use Topics    Alcohol use: No     Comment: RARE    Drug use: No        Allergies     No Known Allergies    Medications     Current Outpatient Medications   Medication Sig Dispense Refill    enalapril (VASOTEC) 20 MG tablet One tablet twice daily for blood pressure 180 tablet 3    labetalol (NORMODYNE) 200 MG tablet One tab twice daily 180 tablet 3    simvastatin (ZOCOR) 20 MG tablet TAKE ONE TABLET EVERY EVENING FOR CHOLESTEROL 90 tablet 3    hydroCHLOROthiazide (HYDRODIURIL) 25 MG tablet TAKE 1 TABLET EVERY DAY FOR BLOOD PRESSURE 90 tablet 3    Omega-3 Fatty Acids (FISH OIL) 1200 MG CAPS Take 1 capsule by mouth daily.  latanoprost (XALATAN) 0.005 % ophthalmic solution Place 1 drop into both eyes nightly.  Cholecalciferol (VITAMIN D) 2000 UNITS CAPS capsule 1 capsule daily. With food      Niacin 1000 MG TBCR 1,000 mg daily.  naproxen (NAPROSYN) 500 MG tablet Take 1 tablet by mouth 2 times daily (with meals) 60 tablet 0     No current facility-administered medications for this visit. Review of Systems     REVIEW OF SYSTEM: See HPI above    PhysicalExam     Vitals:    06/13/22 1021   BP: 127/70   Site: Left Upper Arm   Position: Sitting   Cuff Size: Medium Adult   Pulse: 75   Temp: 98 °F (36.7 °C)   TempSrc: Temporal       PHYSICAL EXAM  /70 (Site: Left Upper Arm, Position: Sitting, Cuff Size: Medium Adult)   Pulse 75   Temp 98 °F (36.7 °C) (Temporal)     GENERAL: No acute distress, alert and oriented. EYES: EOMI, Anti-icteric. NOSE: On anterior rhinoscopy there is no epistaxis, nasal mucosa moist and normal appearing, no purulent drainage. EARS: Normal external appearance; on portable otomicroscopy there is cerumen impaction noted bilaterally. See procedure note below.   FACE: HB 1/6 bilaterally, symmetric appearing, sensation equal bilaterally  ORAL CAVITY: No masses or lesions visualized or palpated, uvula is midline, moist mucous membranes, no oropharyngeal masses or oropharyngeal obstruction  NECK: Normal range of motion, no thyromegaly, trachea is midline, no palpable lymphadenopathy or neck masses, no crepitus  CHEST: Normal respiratory effort, breathing comfortably, no retractions  SKIN: No rashes, normal appearing skin, no evidence of skin lesions/tumors  NEURO: Cranial Nerves 2, 3, 4, 5, 6, 7, 11, 12 grossly intact bilaterally     I have performed a head and neck physical exam personally or was physically present during the key or critical portions of the service. Procedure     Procedure: Binocular otomicroscopy with debridement of cerumen impaction    Pre-op: Cerumen impaction of the bilateral external auditory canals. Post op: Same  Procedure : Binocular otomicroscopy with debridement of cerumen of bilateral external auditory canals  Surgeon: Dr. Rafa Pizano DO  Estimated Blood Loss: None    Description of Procedure:    After obtaining verbal consent, the patient was placed in the examination chair in the reclined position.      -Right ear: External auditory canal with occluding cerumen limiting visualization of the tympanic membrane which was removed with use of #7 and #5 [place] suction, alligator forceps, and cerumen loop curet. After successful removal of cerumen, the right tympanic membrane was visualized and without significant retractions or cholesteatoma, no middle ear effusions.   -Left ear: External auditory canal with occluding cerumen limiting visualization of the tympanic membrane which was removed with use of #7 and #5 [place] suction, alligator forceps, and cerumen loop curet. After successful removal of cerumen, the left tympanic membrane was visualized and without significant retractions or cholesteatoma, no middle ear effusions.      * Patient tolerated the procedure well with no complications    Data/Imaging Review incorrect word recognition. If further clarification is needed please contact the office at [phone].

## 2022-06-21 ENCOUNTER — HOSPITAL ENCOUNTER (OUTPATIENT)
Dept: CT IMAGING | Age: 82
Discharge: HOME | End: 2022-06-21
Payer: MEDICARE

## 2022-06-21 ENCOUNTER — HOSPITAL ENCOUNTER (OUTPATIENT)
Dept: VASCULAR LAB | Age: 82
Discharge: HOME | End: 2022-06-21
Payer: MEDICARE

## 2022-06-21 DIAGNOSIS — I35.0 NONRHEUMATIC AORTIC VALVE STENOSIS: ICD-10-CM

## 2022-06-21 DIAGNOSIS — E78.00 HYPERCHOLESTEREMIA: ICD-10-CM

## 2022-06-21 DIAGNOSIS — R42 DIZZINESS: ICD-10-CM

## 2022-06-21 DIAGNOSIS — I10 ESSENTIAL HYPERTENSION: ICD-10-CM

## 2022-06-21 PROCEDURE — 71275 CT ANGIOGRAPHY CHEST: CPT

## 2022-06-21 PROCEDURE — 6360000004 HC RX CONTRAST MEDICATION: Performed by: INTERNAL MEDICINE

## 2022-06-21 PROCEDURE — 93880 EXTRACRANIAL BILAT STUDY: CPT

## 2022-06-21 RX ADMIN — IOPAMIDOL 150 ML: 755 INJECTION, SOLUTION INTRAVENOUS at 08:30

## 2022-07-01 ENCOUNTER — HOSPITAL ENCOUNTER (OUTPATIENT)
Dept: CARDIAC CATH/INVASIVE PROCEDURES | Age: 82
Setting detail: OBSERVATION
Discharge: HOME | End: 2022-07-02
Attending: INTERNAL MEDICINE | Admitting: INTERNAL MEDICINE
Payer: MEDICARE

## 2022-07-01 DIAGNOSIS — I25.10 CORONARY ARTERY DISEASE DUE TO LIPID RICH PLAQUE: Primary | ICD-10-CM

## 2022-07-01 DIAGNOSIS — I25.83 CORONARY ARTERY DISEASE DUE TO LIPID RICH PLAQUE: Primary | ICD-10-CM

## 2022-07-01 PROBLEM — I20.0 UNSTABLE ANGINA (HCC): Status: ACTIVE | Noted: 2022-07-01

## 2022-07-01 LAB
ANION GAP SERPL CALCULATED.3IONS-SCNC: 8 MMOL/L (ref 3–16)
BUN BLDV-MCNC: 26 MG/DL (ref 7–20)
CALCIUM SERPL-MCNC: 9.7 MG/DL (ref 8.3–10.6)
CHLORIDE BLD-SCNC: 101 MMOL/L (ref 99–110)
CO2: 29 MMOL/L (ref 21–32)
CREAT SERPL-MCNC: 0.9 MG/DL (ref 0.8–1.3)
EKG ATRIAL RATE: 79 BPM
EKG DIAGNOSIS: NORMAL
EKG P AXIS: 30 DEGREES
EKG P-R INTERVAL: 158 MS
EKG Q-T INTERVAL: 352 MS
EKG QRS DURATION: 90 MS
EKG QTC CALCULATION (BAZETT): 403 MS
EKG R AXIS: -7 DEGREES
EKG T AXIS: 64 DEGREES
EKG VENTRICULAR RATE: 79 BPM
GFR AFRICAN AMERICAN: >60
GFR NON-AFRICAN AMERICAN: >60
GLUCOSE BLD-MCNC: 129 MG/DL (ref 70–99)
HCT VFR BLD CALC: 39.4 % (ref 40.5–52.5)
HEMOGLOBIN: 13.3 G/DL (ref 13.5–17.5)
MCH RBC QN AUTO: 30.4 PG (ref 26–34)
MCHC RBC AUTO-ENTMCNC: 33.8 G/DL (ref 31–36)
MCV RBC AUTO: 90 FL (ref 80–100)
PDW BLD-RTO: 13.4 % (ref 12.4–15.4)
PLATELET # BLD: 146 K/UL (ref 135–450)
PMV BLD AUTO: 9.2 FL (ref 5–10.5)
POC ACT LR: 201 SEC
POC ACT LR: 375 SEC
POTASSIUM SERPL-SCNC: 4.2 MMOL/L (ref 3.5–5.1)
RBC # BLD: 4.38 M/UL (ref 4.2–5.9)
SODIUM BLD-SCNC: 138 MMOL/L (ref 136–145)
WBC # BLD: 8.4 K/UL (ref 4–11)

## 2022-07-01 PROCEDURE — 2580000003 HC RX 258: Performed by: INTERNAL MEDICINE

## 2022-07-01 PROCEDURE — C1887 CATHETER, GUIDING: HCPCS

## 2022-07-01 PROCEDURE — 85347 COAGULATION TIME ACTIVATED: CPT

## 2022-07-01 PROCEDURE — 99153 MOD SED SAME PHYS/QHP EA: CPT

## 2022-07-01 PROCEDURE — C9600 PERC DRUG-EL COR STENT SING: HCPCS

## 2022-07-01 PROCEDURE — 36415 COLL VENOUS BLD VENIPUNCTURE: CPT

## 2022-07-01 PROCEDURE — 93010 ELECTROCARDIOGRAM REPORT: CPT | Performed by: INTERNAL MEDICINE

## 2022-07-01 PROCEDURE — 92928 PRQ TCAT PLMT NTRAC ST 1 LES: CPT | Performed by: INTERNAL MEDICINE

## 2022-07-01 PROCEDURE — C1725 CATH, TRANSLUMIN NON-LASER: HCPCS

## 2022-07-01 PROCEDURE — 85027 COMPLETE CBC AUTOMATED: CPT

## 2022-07-01 PROCEDURE — 99152 MOD SED SAME PHYS/QHP 5/>YRS: CPT | Performed by: INTERNAL MEDICINE

## 2022-07-01 PROCEDURE — 2709999900 HC NON-CHARGEABLE SUPPLY

## 2022-07-01 PROCEDURE — 93458 L HRT ARTERY/VENTRICLE ANGIO: CPT

## 2022-07-01 PROCEDURE — 99152 MOD SED SAME PHYS/QHP 5/>YRS: CPT

## 2022-07-01 PROCEDURE — 93458 L HRT ARTERY/VENTRICLE ANGIO: CPT | Performed by: INTERNAL MEDICINE

## 2022-07-01 PROCEDURE — 93005 ELECTROCARDIOGRAM TRACING: CPT | Performed by: INTERNAL MEDICINE

## 2022-07-01 PROCEDURE — C1769 GUIDE WIRE: HCPCS

## 2022-07-01 PROCEDURE — 80048 BASIC METABOLIC PNL TOTAL CA: CPT

## 2022-07-01 PROCEDURE — 2500000003 HC RX 250 WO HCPCS

## 2022-07-01 PROCEDURE — C1894 INTRO/SHEATH, NON-LASER: HCPCS

## 2022-07-01 PROCEDURE — G0378 HOSPITAL OBSERVATION PER HR: HCPCS

## 2022-07-01 PROCEDURE — 2580000003 HC RX 258

## 2022-07-01 PROCEDURE — C1874 STENT, COATED/COV W/DEL SYS: HCPCS

## 2022-07-01 PROCEDURE — 6360000002 HC RX W HCPCS

## 2022-07-01 PROCEDURE — 6370000000 HC RX 637 (ALT 250 FOR IP)

## 2022-07-01 PROCEDURE — 6370000000 HC RX 637 (ALT 250 FOR IP): Performed by: INTERNAL MEDICINE

## 2022-07-01 PROCEDURE — 6360000004 HC RX CONTRAST MEDICATION: Performed by: INTERNAL MEDICINE

## 2022-07-01 RX ORDER — ONDANSETRON 2 MG/ML
4 INJECTION INTRAMUSCULAR; INTRAVENOUS EVERY 6 HOURS PRN
Status: DISCONTINUED | OUTPATIENT
Start: 2022-07-01 | End: 2022-07-02 | Stop reason: HOSPADM

## 2022-07-01 RX ORDER — ASPIRIN 81 MG/1
81 TABLET, CHEWABLE ORAL DAILY
Status: DISCONTINUED | OUTPATIENT
Start: 2022-07-02 | End: 2022-07-02 | Stop reason: HOSPADM

## 2022-07-01 RX ORDER — ACETAMINOPHEN 325 MG/1
650 TABLET ORAL EVERY 4 HOURS PRN
Status: DISCONTINUED | OUTPATIENT
Start: 2022-07-01 | End: 2022-07-02 | Stop reason: HOSPADM

## 2022-07-01 RX ORDER — CLOPIDOGREL BISULFATE 75 MG/1
75 TABLET ORAL DAILY
Status: DISCONTINUED | OUTPATIENT
Start: 2022-07-01 | End: 2022-07-02 | Stop reason: HOSPADM

## 2022-07-01 RX ORDER — SODIUM CHLORIDE 0.9 % (FLUSH) 0.9 %
5-40 SYRINGE (ML) INJECTION PRN
Status: DISCONTINUED | OUTPATIENT
Start: 2022-07-01 | End: 2022-07-02 | Stop reason: HOSPADM

## 2022-07-01 RX ORDER — ATORVASTATIN CALCIUM 80 MG/1
80 TABLET, FILM COATED ORAL NIGHTLY
Status: DISCONTINUED | OUTPATIENT
Start: 2022-07-01 | End: 2022-07-02 | Stop reason: HOSPADM

## 2022-07-01 RX ORDER — HYDROCHLOROTHIAZIDE 25 MG/1
25 TABLET ORAL DAILY
Status: DISCONTINUED | OUTPATIENT
Start: 2022-07-01 | End: 2022-07-02 | Stop reason: HOSPADM

## 2022-07-01 RX ORDER — SODIUM CHLORIDE 9 MG/ML
INJECTION, SOLUTION INTRAVENOUS PRN
Status: DISCONTINUED | OUTPATIENT
Start: 2022-07-01 | End: 2022-07-02 | Stop reason: HOSPADM

## 2022-07-01 RX ORDER — LABETALOL 200 MG/1
200 TABLET, FILM COATED ORAL EVERY 12 HOURS SCHEDULED
Status: DISCONTINUED | OUTPATIENT
Start: 2022-07-01 | End: 2022-07-02 | Stop reason: HOSPADM

## 2022-07-01 RX ORDER — ENALAPRIL MALEATE 10 MG/1
20 TABLET ORAL DAILY
Status: DISCONTINUED | OUTPATIENT
Start: 2022-07-01 | End: 2022-07-02 | Stop reason: HOSPADM

## 2022-07-01 RX ORDER — SODIUM CHLORIDE 0.9 % (FLUSH) 0.9 %
5-40 SYRINGE (ML) INJECTION EVERY 12 HOURS SCHEDULED
Status: DISCONTINUED | OUTPATIENT
Start: 2022-07-01 | End: 2022-07-02 | Stop reason: HOSPADM

## 2022-07-01 RX ADMIN — CLOPIDOGREL BISULFATE 75 MG: 75 TABLET ORAL at 20:15

## 2022-07-01 RX ADMIN — ENALAPRIL MALEATE 20 MG: 10 TABLET ORAL at 20:15

## 2022-07-01 RX ADMIN — IOPAMIDOL 160 ML: 755 INJECTION, SOLUTION INTRAVENOUS at 09:16

## 2022-07-01 RX ADMIN — HYDROCHLOROTHIAZIDE 25 MG: 25 TABLET ORAL at 14:37

## 2022-07-01 RX ADMIN — Medication 10 ML: at 20:49

## 2022-07-01 RX ADMIN — ATORVASTATIN CALCIUM 80 MG: 80 TABLET, FILM COATED ORAL at 20:15

## 2022-07-01 RX ADMIN — LABETALOL HYDROCHLORIDE 200 MG: 200 TABLET, FILM COATED ORAL at 20:15

## 2022-07-01 NOTE — PROGRESS NOTES
Pt. To room 3321 from cath lab via stretcher. R radial cath site assessed with cath RN. Bruising and swelling noted to site d/t prior hematoma. Old blood noted on dressing. Pt. VSS, patient denies pain, patient oriented to room. Pt. Updated on POC, denies questions. Pt. Given toiletries, denies further needs. Bed in lowest position, call light and bedside table within reach. Will continue to monitor.

## 2022-07-01 NOTE — H&P
[address]  H+P  Consult  OP Visit  FU Visit   CC HX HPI   GEN  Here today for [facility] for TAVR workup. Has noted increased sob with exertion. AS  Ø CP, dizziness. Progressive sob. HTN  Ambulatory BP in good range. Ø HA/dizziness. CHOL  Last lipid reviewed. On max dose/tolerated statin. MED  Compliant with CV meds. Ø reported SA. HISTORY/ALLERGY/ROS   MEDHx  has a past medical history of Aortic regurgitation, Arthritis, Environmental allergies, Heart murmur, Hypercholesterolemia, Hypertension, Impaired fasting glucose, and Prostate cancer ([address] SURGHx  has a past surgical history that includes other surgical history (2008); Prostate surgery (2007); Colonoscopy (01/2017); and Hand surgery (Right, 2012). SOCHx  reports that he has never smoked. He has never used smokeless tobacco. He reports that he does not drink alcohol and does not use drugs. FAMHx family history includes Cancer in his mother; Diabetes in his paternal grandmother; Heart Disease (age of onset: 77) in his father. ALLERG Patient has no known allergies.    ROS Full ROS obtained and negative except as mentioned in HPI   MEDICATIONS   Current Facility-Administered Medications   Medication Dose Route Frequency Provider Last Rate Last Admin    sodium chloride flush 0.9 % injection 5-40 mL  5-40 mL IntraVENous PRN Maura White MD          PHYSICAL EXAM   Vitals Vitals:    07/01/22 0721   BP: (!) 150/75   Pulse: 79   Resp: 16   Temp: 98.1 °F (36.7 °C)   SpO2: 99%      Gen Alert, coop, no distress Heart  Rrr, 3/6   Head NC, AT, no abnorm Abd  Soft, NT, +BS, no mass, no OM   Eyes PER, conj/corn clear Ext  Ext nl, AT, no C/C/E   Nose Nares nl, no drain, NT Pulse 2+ and symmetric   Throat Lips, mucosa, tongue nl Skin Col/text/turg nl, no vis rash/les   Neck S/S, TM, NT, no bruit/JVD Psych Nl mood and affect   Lung CTA-B, unlabored, no DTP Lymph   No cervical or axillary LA   Ch wall NT, no deform Neuro  Nl gross M/S

## 2022-07-01 NOTE — PRE SEDATION
[facility] Cardiology  Brief Pre-Op Note/Sedation Assessment    Yvette Mccollum  1940  [id number]  7:56 AM    Planned Procedure: Cardiac Catheterization Procedure  Post Procedure Plan: Return to same level of care  Consent:   I have discussed with the patient and/or the patient representative the indication, alternatives, and the possible risks and/or complications of the planned procedure and the anesthesia methods. The patient and/or patient representative appear to understand and agree to proceed. Chief Complaint:   Dyspnea on Exertion  Dyspnea     Indications for Procedure:  Presentation Valvular Disease - Aortic Stenosis; Stenosis Severity: Severe   Anginal Class (2 wks) CCS III - Symptoms with everyday living activities, i.e., moderate limitation   Anginal Sx Typical CP   CHF Class (2wks) Yes:  Heart Failure Type: Diastolic Severity:  Class II - Symptoms of HF on ordinary exertion   CV Instability Yes     Prior Ischemic Workup/Eval:  Pre-Proc Meds Yes: ACE/ARB/ARNI, Beta Blockers and STATIN   Stress Test? No     Does Patient need surgery? Cardiac Valve Surgery Yes:  Aortic Stenosis; Stenosis Severity: Severe Intermediate < 4 METS     Pre-Procedure Medical History:  Vital Signs BP (!) 150/75   Pulse 79   Temp 98.1 °F (36.7 °C) (Temporal)   Resp 16   Ht 5' 9\" (1.753 m)   Wt 203 lb (92.1 kg)   SpO2 99%   BMI 29.98 kg/m²    Allergies Reviewed in EMR   Medications Reviewed in EMR   Past Med Hx Reviewed in EMR   Surg Hx Reviewed in EMR     Pre-Sedation:  Pre-Sedation Exam I have personally completed a history, physical exam & review of systems for this patient (see notes). Hx Anesthesia Comps None   Mod Mallampati II   ASA Class Class 2 - A normal healthy patient with mild systemic disease     Queta Scale:   Activity 2 - Able to move 4 extrem on command   Respiration 2 - Able to breath deeply and cough freely   Circulation 2 - BP +/- 20mmHg of normal   Consciousness 2 - Fully awake   Oxygen Saturation 2 - Able to maintain oxygen sat >92% on room air     Sedation/Anesthesia Plan:  Guard patient safety and welfare. Minimize physical discomfort and pain. Minimize negative psychological responses to treatment by providing sedation and analgesia and maximize the potential amnesia. Patient to meet pre-procedure discharge plan.      Medication Planned:  Midazolam intravenously and fentanyl intravenously     Patient is an appropriate candidate for plan of sedation:   Yes    Electronically signed by Skyler Govea RN on 7/1/2022 at 7:56 AM

## 2022-07-01 NOTE — OP NOTE
[facility]  Procedure Note    Procedure: [place]  Indication: SOB, AS    Procedure Details:  Consent Access Bleed R Sedat Start Stop Versed Fentanyl Contrast Fluoro EBL Comp Spec   Yes RRA low [facility] [id number] 2 100 160 18.7 <20 None None    US guidance used to determine aforementioned artery patency, size (>2mm), anatomic variations and ideal puncture location.  Real-time US utilized concurrent with vascular needle entry into artery. Image(s) permanently recorded and reported in chart.  Independent trained observer pushed medications at my direction. We monitored the patient's level of consciousness and vital   signs/physiologic status throughout the procedure duration (see start and stop times above, as well as medication dosages). Findings:  Artery Findings/Result   LM normal   LAD 90% mid   Cx 30% mid   RI N/A   RCA 30% distal   LVEDP 20   LVG NA     Intervention:   PCI of LAD with 3.5X23 Xience LUIS (PD with 3.5NC)    Post Cath Dx:   CAD as above    Med Rec:   Recommendation Indicated? Not Given Due To: Detail(s)   DAPT  Yes  Asa, brilinta   STATIN - HIGH DOSE Yes  lipitor 80   BETA BLOCKER Yes  labetolol   ACE/ARB/ARNI no  enalapril   ALDACTONE no       Non-Med Rec:   Category Recommendation   EF ASSESSMENT Noninvasive assessment of EF if LVG deferred as IP/OP as appropriate   TOBACCO Avoidance of first and second hand smoke   DIET/EXERCISE Maintain healthy lifestyle with focus on diet, exercise and weight loss   CARDIAC REHAB Referral to outpatient cardiac rehab phase II will be deferred until patient follow-up in office and then determine patient safety and appropriateness to proceed. STATIN Patient started or continued on max tolerated dose of statin or high intensity statin as appropriate.   If no statin prescribed, secondary to intolerance, allergy or patient refusal.

## 2022-07-02 VITALS
WEIGHT: 205 LBS | TEMPERATURE: 98 F | HEIGHT: 69 IN | HEART RATE: 72 BPM | BODY MASS INDEX: 30.36 KG/M2 | OXYGEN SATURATION: 97 % | RESPIRATION RATE: 18 BRPM | SYSTOLIC BLOOD PRESSURE: 110 MMHG | DIASTOLIC BLOOD PRESSURE: 62 MMHG

## 2022-07-02 LAB
ANION GAP SERPL CALCULATED.3IONS-SCNC: 7 MMOL/L (ref 3–16)
BUN BLDV-MCNC: 20 MG/DL (ref 7–20)
CALCIUM SERPL-MCNC: 9.6 MG/DL (ref 8.3–10.6)
CHLORIDE BLD-SCNC: 102 MMOL/L (ref 99–110)
CO2: 27 MMOL/L (ref 21–32)
CREAT SERPL-MCNC: 0.8 MG/DL (ref 0.8–1.3)
EKG ATRIAL RATE: 56 BPM
EKG DIAGNOSIS: NORMAL
EKG P AXIS: 66 DEGREES
EKG P-R INTERVAL: 162 MS
EKG Q-T INTERVAL: 410 MS
EKG QRS DURATION: 94 MS
EKG QTC CALCULATION (BAZETT): 395 MS
EKG R AXIS: -6 DEGREES
EKG T AXIS: 91 DEGREES
EKG VENTRICULAR RATE: 56 BPM
GFR AFRICAN AMERICAN: >60
GFR NON-AFRICAN AMERICAN: >60
GLUCOSE BLD-MCNC: 103 MG/DL (ref 70–99)
HCT VFR BLD CALC: 36.2 % (ref 40.5–52.5)
HEMOGLOBIN: 12.4 G/DL (ref 13.5–17.5)
MCH RBC QN AUTO: 30.7 PG (ref 26–34)
MCHC RBC AUTO-ENTMCNC: 34.4 G/DL (ref 31–36)
MCV RBC AUTO: 89.3 FL (ref 80–100)
PDW BLD-RTO: 13.4 % (ref 12.4–15.4)
PLATELET # BLD: 127 K/UL (ref 135–450)
PMV BLD AUTO: 9.4 FL (ref 5–10.5)
POTASSIUM SERPL-SCNC: 3.8 MMOL/L (ref 3.5–5.1)
RBC # BLD: 4.05 M/UL (ref 4.2–5.9)
SODIUM BLD-SCNC: 136 MMOL/L (ref 136–145)
WBC # BLD: 8.8 K/UL (ref 4–11)

## 2022-07-02 PROCEDURE — 2580000003 HC RX 258: Performed by: INTERNAL MEDICINE

## 2022-07-02 PROCEDURE — 93010 ELECTROCARDIOGRAM REPORT: CPT | Performed by: INTERNAL MEDICINE

## 2022-07-02 PROCEDURE — 99239 HOSP IP/OBS DSCHRG MGMT >30: CPT | Performed by: CLINICAL NURSE SPECIALIST

## 2022-07-02 PROCEDURE — 6370000000 HC RX 637 (ALT 250 FOR IP): Performed by: INTERNAL MEDICINE

## 2022-07-02 PROCEDURE — G0378 HOSPITAL OBSERVATION PER HR: HCPCS

## 2022-07-02 PROCEDURE — 80048 BASIC METABOLIC PNL TOTAL CA: CPT

## 2022-07-02 PROCEDURE — 36415 COLL VENOUS BLD VENIPUNCTURE: CPT

## 2022-07-02 PROCEDURE — 85027 COMPLETE CBC AUTOMATED: CPT

## 2022-07-02 RX ORDER — ATORVASTATIN CALCIUM 80 MG/1
80 TABLET, FILM COATED ORAL NIGHTLY
Qty: 30 TABLET | Refills: 0 | Status: SHIPPED | OUTPATIENT
Start: 2022-07-02 | End: 2022-07-08 | Stop reason: SDUPTHER

## 2022-07-02 RX ORDER — ASPIRIN 81 MG/1
81 TABLET, CHEWABLE ORAL DAILY
Qty: 30 TABLET | Refills: 0 | Status: ON HOLD | OUTPATIENT
Start: 2022-07-03 | End: 2022-08-31

## 2022-07-02 RX ORDER — LABETALOL 200 MG/1
200 TABLET, FILM COATED ORAL EVERY 12 HOURS SCHEDULED
Qty: 60 TABLET | Refills: 0 | Status: SHIPPED | OUTPATIENT
Start: 2022-07-02 | End: 2022-08-03

## 2022-07-02 RX ORDER — HYDROCHLOROTHIAZIDE 25 MG/1
25 TABLET ORAL DAILY
Qty: 30 TABLET | Refills: 0 | Status: SHIPPED | OUTPATIENT
Start: 2022-07-03 | End: 2022-09-02

## 2022-07-02 RX ORDER — CLOPIDOGREL BISULFATE 75 MG/1
75 TABLET ORAL DAILY
Qty: 30 TABLET | Refills: 0 | Status: SHIPPED | OUTPATIENT
Start: 2022-07-03 | End: 2022-07-08 | Stop reason: SDUPTHER

## 2022-07-02 RX ADMIN — CLOPIDOGREL BISULFATE 75 MG: 75 TABLET ORAL at 09:41

## 2022-07-02 RX ADMIN — Medication 10 ML: at 09:44

## 2022-07-02 RX ADMIN — LABETALOL HYDROCHLORIDE 200 MG: 200 TABLET, FILM COATED ORAL at 09:41

## 2022-07-02 RX ADMIN — ENALAPRIL MALEATE 20 MG: 10 TABLET ORAL at 09:41

## 2022-07-02 RX ADMIN — HYDROCHLOROTHIAZIDE 25 MG: 25 TABLET ORAL at 09:41

## 2022-07-02 RX ADMIN — ASPIRIN 81 MG: 81 TABLET, CHEWABLE ORAL at 09:41

## 2022-07-02 NOTE — DISCHARGE SUMMARY
[address]  Discharge Summary  Patient ID:  Guy Acosta  [id number] y.o. 1940    Admit date: 7/1/2022    Discharge date:  7/2/2022    Admitting Physician: Judie Savage MD     Discharge Physician: JESSICA Faustin - CNS     Admission Diagnoses: Nonrheumatic aortic (valve) stenosis [I35.0]  Unstable angina ([address] [I20.0]    Discharge Diagnoses:   Patient Active Problem List   Diagnosis    HTN (hypertension)    Hypercholesteremia    Impaired fasting glucose    Aortic regurgitation    Environmental allergies    Dupuytren's contracture    Ankle arthritis, right    Arthritis of right ankle    Prediabetes    Sensorineural hearing loss, bilateral    SOB (shortness of breath)    Unstable angina ([address]    Coronary artery disease due to calcified coronary lesion        Discharged Condition: good  The patient was seen and examined on day of discharge and this discharge summary is in conjunction with any daily progress note from day of discharge. Hospital Course: Guy Acosta was admitted 7/1/2022 for [facility] for TAVR workup and PCI of LAD done yesterday. His right radial is bruised from wrist to mid arm, no hematoma or bleeding, good pulse. He has follow up scheduled with Dr Naomy Orlando. He denies chest pain, palpitations, lightheadedness or edema. His wife is at his side (she is a retired nurse).     Consults:  IP CONSULT TO CARDIAC REHAB  IP CONSULT TO CARDIAC REHAB  Physical Exam:  /62   Pulse 72   Temp 98 °F (36.7 °C) (Oral)   Resp 18   Ht 5' 9\" (1.753 m)   Wt 205 lb (93 kg)   SpO2 97%   BMI 30.27 kg/m²   No intake or output data in the 24 hours ending 07/02/22 1133  General:  Awake, alert, NAD  Skin:  Warm and dry  Neck:  JVD<8, no bruit  Chest:  Clear to auscultation, no wheezes/rhonchi/rales  Cardiovascular:  RRR S1S2  Abdomen:  Soft, nontender, +bowel sounds  Extremities:  no edema    Significant Diagnostic Studies:     [facility] Dr Britni Jaimes 7/1/2022  Findings:  Artery Findings/Result   LM normal   LAD 90% mid   Cx 30% mid   RI N/A   RCA 30% distal   LVEDP 20   LVG NA      Intervention:         PCI of LAD with 3.5X23 Xience LUIS (PD with 3.5NC)     Post Cath Dx:       CAD as above     Med Rec:               Recommendation Indicated? Not Given Due To: Detail(s)   DAPT  Yes   Asa, brilinta   STATIN - HIGH DOSE Yes   lipitor 80   BETA BLOCKER Yes   labetolol   ACE/ARB/ARNI no   enalapril   ALDACTONE no          Echocardiography: 4/28/2022  Summary   Normal left ventricle size, wall thickness, and systolic function with an   estimated ejection fraction of 55-60%. No regional wall motion abnormalities are seen. Normal diastolic function. Severe aortic stenosis with a peak velocity of 4.22m/s and a mean pressure   gradient of 42mmHg. Mild-moderate aortic regurgitation. The right ventricle is mildly enlarged. Mild tricuspid regurgitation. Moderate pulmonic regurgitation present. Labs:   Lab Results   Component Value Date    CREATININE 0.8 07/02/2022    BUN 20 07/02/2022     07/02/2022    K 3.8 07/02/2022     07/02/2022    CO2 27 07/02/2022      Lab Results   Component Value Date    WBC 8.8 07/02/2022    HGB 12.4 (L) 07/02/2022    HCT 36.2 (L) 07/02/2022    MCV 89.3 07/02/2022     (L) 07/02/2022    No results found for: INR, PROTIME No results found for: BNP        Treatments:     Disposition: home    Patient Instructions:      Medication List      START taking these medications    aspirin 81 MG chewable tablet  Take 1 tablet by mouth daily  Start taking on: July 3, 2022  Notes to patient: Aspirin  Use: Prevents heart attacks & strokes. Side effects: bleeding or bruising more easily, upset stomach. atorvastatin 80 MG tablet  Commonly known as: LIPITOR  Take 1 tablet by mouth nightly  Notes to patient:    Atorvastatin (Lipitor®)  Use: lower bad cholesterol   Side effects: headache, muscle pains, constipation, diarrhea. clopidogrel 75 MG tablet  Commonly known as: PLAVIX  Take 1 tablet by mouth daily  Start taking on: July 3, 2022  Notes to patient: Use: prevention of blood clots  Side effects: bruise easily, nosebleed, generalized discomfort        CHANGE how you take these medications    hydroCHLOROthiazide 25 MG tablet  Commonly known as: HYDRODIURIL  Take 1 tablet by mouth daily  Start taking on: July 3, 2022  What changed:   · how much to take  · how to take this  · when to take this  · additional instructions  Notes to patient: Use: a thiazide diuretic (water pill) to prevent fluid retention  Side effects: abdominal discomfort, stomach upset, leg or back pain, tarry stools, bleeding gums     labetalol 200 MG tablet  Commonly known as: NORMODYNE  Take 1 tablet by mouth every 12 hours  What changed:   · how much to take  · how to take this  · when to take this  · additional instructions        CONTINUE taking these medications    enalapril 20 MG tablet  Commonly known as: VASOTEC  One tablet twice daily for blood pressure  Notes to patient: Use: treats hypertension, congestive heart failure  Side effects: hypotension, chest pain, dizziness, head ache, syncope     Fish Oil 1200 MG Caps  Notes to patient: Antilipemic agent  Use: Dietary supplement for coronary artery disease  Side Effects: Stomach upset, diarrhea     latanoprost 0.005 % ophthalmic solution  Commonly known as: XALATAN  Notes to patient: [place]  Use: Decrease elevated intraocular pressure  Side Effects: Blurred vision, eyelash changes     Niacin 1000 MG Tbcr  Notes to patient: Use: to lower LDL (bad cholesterol) and triglycerides  Side effects: flushing or warm sensation particularly in face and neck, headache, decreased appetite, abdominal discomfort, dark urine     vitamin D 50 MCG (2000 UT) Caps capsule        ASK your doctor about these medications    simvastatin 20 MG tablet  Commonly known as: ZOCOR  TAKE ONE TABLET EVERY EVENING FOR CHOLESTEROL  Notes to patient: Simvastatin/ Zocor  Use: lower bad cholesterol  Side effects: headache, muscle pain, constipation, diarrhea           Where to Get Your Medications      These medications were sent to [facility] [id number] Jerry Avila, [address] [phone]  [facility] [address]    Phone: [phone]   · aspirin 81 MG chewable tablet  · atorvastatin 80 MG tablet  · clopidogrel 75 MG tablet     You can get these medications from any pharmacy    Bring a paper prescription for each of these medications  · hydroCHLOROthiazide 25 MG tablet  · labetalol 200 MG tablet       Activity: activity as tolerated  Diet: cardiac diet  Wound Care: keep wound clean and dry    Follow-up with Dr Miguel Collier 7/6/2022 and will schedule follow up in cardiology in a couple weeks    Medications sent to his pharmacy  Discussed with bedside nurse    Signed:  JESSICA Garcia, 7/2/2022, 11:33 AM

## 2022-07-05 ENCOUNTER — TELEPHONE (OUTPATIENT)
Dept: FAMILY MEDICINE CLINIC | Age: 82
End: 2022-07-05

## 2022-07-05 NOTE — TELEPHONE ENCOUNTER
[address] Transitions Initial Follow Up Call    Outreach made within 2 business days of discharge: Yes    Patient: Ольга Macias Patient : 1940   MRN: [id number]  Reason for Admission: There are no discharge diagnoses documented for the most recent discharge. Discharge Date: 22       Spoke with: Kati Mukherjee     Discharge department/facility: [address] [place] [address] Interactive Patient Contact:  Was patient able to fill all prescriptions: Yes  Was patient instructed to bring all medications to the follow-up visit: Yes  Is patient taking all medications as directed in the discharge summary?  Yes  Does patient understand their discharge instructions: Yes  Does patient have questions or concerns that need addressed prior to 7-14 day follow up office visit: no    Scheduled appointment with PCP within 7-14 days    Follow Up  Future Appointments   Date Time Provider Felicity Manzo   2022 11:00 AM Ramirez Hogue MD [facility]   7/15/2022 10:15 AM JESSICA Márquez - CNP [facility] Cardio [facility]   2022  7:40 AM Cj Nascimento MD [address] PT Friday AT 2:40.TRAY   [facility] [place]

## 2022-07-05 NOTE — TELEPHONE ENCOUNTER
----- Message from Christiane Valadez sent at 7/5/2022  8:26 AM EDT -----  Subject: Hospital Follow Up    QUESTIONS  What hospital was the Patient Discharged from? [facility]   Date of Discharge? 2022-07-02  Discharge Location? Home  Reason for hospitalization as patient stated? Patient had stent put in and   arm swelled up. What question does the patient have, if applicable? Patient needs a follow   up visit. No followup is noted yet in Epic. Please reach out to patient.  ---------------------------------------------------------------------------  --------------  CALL BACK INFO  What is the best way for the office to contact you? OK to leave message on   voicemail  Preferred Call Back Phone Number? [phone]  ---------------------------------------------------------------------------  --------------  SCRIPT ANSWERS  Relationship to Patient?  Self

## 2022-07-06 ENCOUNTER — OFFICE VISIT (OUTPATIENT)
Dept: CARDIOTHORACIC SURGERY | Age: 82
End: 2022-07-06
Payer: MEDICARE

## 2022-07-06 VITALS
OXYGEN SATURATION: 99 % | BODY MASS INDEX: 30.24 KG/M2 | WEIGHT: 204.2 LBS | SYSTOLIC BLOOD PRESSURE: 128 MMHG | TEMPERATURE: 98.1 F | DIASTOLIC BLOOD PRESSURE: 60 MMHG | HEIGHT: 69 IN | HEART RATE: 76 BPM

## 2022-07-06 DIAGNOSIS — I35.0 AORTIC STENOSIS, SEVERE: Primary | ICD-10-CM

## 2022-07-06 PROCEDURE — G8427 DOCREV CUR MEDS BY ELIG CLIN: HCPCS | Performed by: THORACIC SURGERY (CARDIOTHORACIC VASCULAR SURGERY)

## 2022-07-06 PROCEDURE — 99204 OFFICE O/P NEW MOD 45 MIN: CPT | Performed by: THORACIC SURGERY (CARDIOTHORACIC VASCULAR SURGERY)

## 2022-07-06 PROCEDURE — 1036F TOBACCO NON-USER: CPT | Performed by: THORACIC SURGERY (CARDIOTHORACIC VASCULAR SURGERY)

## 2022-07-06 PROCEDURE — 1123F ACP DISCUSS/DSCN MKR DOCD: CPT | Performed by: THORACIC SURGERY (CARDIOTHORACIC VASCULAR SURGERY)

## 2022-07-06 PROCEDURE — G8417 CALC BMI ABV UP PARAM F/U: HCPCS | Performed by: THORACIC SURGERY (CARDIOTHORACIC VASCULAR SURGERY)

## 2022-07-06 ASSESSMENT — ENCOUNTER SYMPTOMS
COUGH: 0
GASTROINTESTINAL NEGATIVE: 1
CHEST TIGHTNESS: 0
EYES NEGATIVE: 1
WHEEZING: 0
SHORTNESS OF BREATH: 1
ALLERGIC/IMMUNOLOGIC NEGATIVE: 1

## 2022-07-06 NOTE — PROGRESS NOTES
Subjective:      Patient ID: Surendra Larsen is a 80 y.o. male. Chief Complaint   Patient presents with   [facility] Patient     Mr. Renae is being seen today at the request of Dr. Aracely Chopra for TAVR consult. ERNESTO Drummond is a very pleasant 74-year-old man who has known he has had a heart murmur for at least 20 years by his own recounting. He and his wife walked together a lot. He says that over the last month or perhaps 2 that he has noticed increasing shortness of breath. This led to additional investigations. His cardiac echo showed that his aortic valve was really tight and he recently underwent an angiogram and was found to have 1 tightly blocked blood vessel for which he received a stent. Apart from this he enjoys good health. He is looking forward to getting the aortic valve procedure done and behind him. Review of Systems   Constitutional: Negative. HENT: Negative. Eyes: Negative. Wears glasses   Respiratory: Positive for shortness of breath (w/exertion). Negative for cough, chest tightness and wheezing. Cardiovascular: Negative. Gastrointestinal: Negative. Endocrine: Negative. Genitourinary: Negative. Musculoskeletal: Negative. Skin: Negative. Allergic/Immunologic: Negative. Neurological: Negative. Hematological: Negative. Psychiatric/Behavioral: Negative.       Past Medical History:   Diagnosis Date    Aortic regurgitation 01/01/2009    Aortic stenosis     Arthritis     Pt says in his right ankle    Environmental allergies     Heart murmur     Hypercholesterolemia     Hypertension     Impaired fasting glucose     Prostate cancer ([address] 01/01/2006    Samuel     Past Surgical History:   Procedure Laterality Date    COLONOSCOPY  01/2017    Repeat 5 years    CORONARY ANGIOPLASTY WITH STENT PLACEMENT  07/01/2022    HAND SURGERY Right 2012    OTHER SURGICAL HISTORY  2008    RADIOACTIVE SEED IMPLANTS FOR PROSTATE CANCER    PROSTATE SURGERY  2007 seed implant ([place])     No Known Allergies  Current Outpatient Medications   Medication Sig Dispense Refill    atorvastatin (LIPITOR) 80 MG tablet Take 1 tablet by mouth nightly 30 tablet 0    aspirin 81 MG chewable tablet Take 1 tablet by mouth daily 30 tablet 0    labetalol (NORMODYNE) 200 MG tablet Take 1 tablet by mouth every 12 hours 60 tablet 0    hydroCHLOROthiazide (HYDRODIURIL) 25 MG tablet Take 1 tablet by mouth daily 30 tablet 0    clopidogrel (PLAVIX) 75 MG tablet Take 1 tablet by mouth daily 30 tablet 0    enalapril (VASOTEC) 20 MG tablet One tablet twice daily for blood pressure 180 tablet 3    Omega-3 Fatty Acids (FISH OIL) 1200 MG CAPS Take 1 capsule by mouth daily.  latanoprost (XALATAN) 0.005 % ophthalmic solution Place 1 drop into both eyes nightly.  Cholecalciferol (VITAMIN D) 2000 UNITS CAPS capsule 1 capsule daily. With food      Niacin 1000 MG TBCR 1,000 mg daily. No current facility-administered medications for this visit.      Social History     Socioeconomic History    Marital status:      Spouse name: Not on file    Number of children: Not on file    Years of education: Not on file    Highest education level: Not on file   Occupational History    Not on file   Tobacco Use    Smoking status: Never Smoker    Smokeless tobacco: Never Used    Tobacco comment: counselled to never start   Vaping Use    Vaping Use: Never used   Substance and Sexual Activity    Alcohol use: No     Comment: RARE    Drug use: No    Sexual activity: Yes     Partners: Female   Other Topics Concern    Not on file   Social History Narrative    Not on file     Social Determinants of Health     Financial Resource Strain: Low Risk     Difficulty of Paying Living Expenses: Not hard at all   Food Insecurity: No Food Insecurity    Worried About [address] in the Last Year: Never true    Tomas of Food in the Last Year: Never true   Transportation Needs: No Transportation Needs    Lack of Transportation (Medical): No    Lack of Transportation (Non-Medical): No   Physical Activity:     Days of Exercise per Week: Not on file    Minutes of Exercise per Session: Not on file   Stress:     Feeling of Stress : Not on file   Social Connections:     Frequency of Communication with Friends and Family: Not on file    Frequency of Social Gatherings with Friends and Family: Not on file    Attends Confucianism Services: Not on file    Active Member of [address] [place] [address] [employer] or Organizations: Not on file    Attends Club or Organization Meetings: Not on file    Marital Status: Not on file   Intimate Partner Violence:     Fear of Current or Ex-Partner: Not on file    Emotionally Abused: Not on file    Physically Abused: Not on file    Sexually Abused: Not on file   Housing Stability:     Unable to Pay for Housing in the Last Year: Not on file    Number of [place] in the Last Year: Not on file    Unstable Housing in the Last Year: Not on file     Family History   Problem Relation Age of Onset    Cancer Mother         breast    Heart Disease Father 77        MI    Diabetes Paternal Grandmother      Objective:   Physical Exam  Constitutional:       General: He is not in acute distress. Appearance: Normal appearance. He is well-developed. He is obese. He is not diaphoretic. HENT:      Head: Normocephalic. Nose: Nose normal.   Eyes:      General: No scleral icterus. Conjunctiva/sclera: Conjunctivae normal.      Pupils: Pupils are equal, round, and reactive to light. Neck:      Thyroid: No thyromegaly. Vascular: No JVD. Trachea: No tracheal deviation. Cardiovascular:      Rate and Rhythm: Normal rate and regular rhythm. Pulses: Normal pulses. Heart sounds: Murmur heard. No friction rub. No gallop.        Comments: 4/6 systolic ejection murmur heard throughout his precordium and radiating up into both carotids, left louder than right  Pulmonary: Effort: Pulmonary effort is normal. No respiratory distress. Breath sounds: No stridor. Abdominal:      General: Bowel sounds are normal. There is no distension. Palpations: Abdomen is soft. Tenderness: There is no abdominal tenderness. There is no guarding. Musculoskeletal:         General: No deformity. Normal range of motion. Cervical back: Normal range of motion. Right lower leg: No edema. Left lower leg: No edema. Skin:     General: Skin is warm and dry. Capillary Refill: Capillary refill takes less than 2 seconds. Coloration: Skin is not jaundiced or pale. Neurological:      General: No focal deficit present. Mental Status: He is alert and oriented to person, place, and time. Cranial Nerves: No cranial nerve deficit. Coordination: Coordination normal.   Psychiatric:         Mood and Affect: Mood normal.         Behavior: Behavior normal.         Thought Content: Thought content normal.         Judgment: Judgment normal.       Vitals:    07/06/22 1044 07/06/22 1046   BP: 126/60 128/60   Site: Left Upper Arm Right Upper Arm   Position: Sitting Sitting   Pulse: 76    Temp: 98.1 °F (36.7 °C)    TempSrc: Infrared    SpO2: 99%    Weight: 204 lb 3.2 oz (92.6 kg)    Height: 5' 9\" (1.753 m)      Assessment:      Otherwise healthy 56-year-old male with recently completed coronary stent and with residual severe aortic valve stenosis. Plan:      Mr. Susie Roldan is an ideal candidate for a TAVR procedure. I talk with him and his wife about the procedure and all her questions were answered. He tells me that he is scheduled tentatively for August 16. They both know they are welcome to call or return to my office at any time prior to his procedure should there be any new problems, questions or concerns for which we can be of help.

## 2022-07-08 ENCOUNTER — OFFICE VISIT (OUTPATIENT)
Dept: FAMILY MEDICINE CLINIC | Age: 82
End: 2022-07-08
Payer: MEDICARE

## 2022-07-08 VITALS
OXYGEN SATURATION: 100 % | WEIGHT: 204 LBS | HEART RATE: 68 BPM | DIASTOLIC BLOOD PRESSURE: 70 MMHG | HEIGHT: 69 IN | BODY MASS INDEX: 30.21 KG/M2 | SYSTOLIC BLOOD PRESSURE: 114 MMHG

## 2022-07-08 DIAGNOSIS — I10 PRIMARY HYPERTENSION: ICD-10-CM

## 2022-07-08 DIAGNOSIS — E78.00 HYPERCHOLESTEREMIA: ICD-10-CM

## 2022-07-08 DIAGNOSIS — I25.10 CORONARY ARTERY DISEASE INVOLVING NATIVE CORONARY ARTERY OF NATIVE HEART WITHOUT ANGINA PECTORIS: Primary | ICD-10-CM

## 2022-07-08 DIAGNOSIS — I35.0 AORTIC VALVE STENOSIS, ETIOLOGY OF CARDIAC VALVE DISEASE UNSPECIFIED: ICD-10-CM

## 2022-07-08 DIAGNOSIS — T14.8XXA BRUISING: ICD-10-CM

## 2022-07-08 PROCEDURE — 1123F ACP DISCUSS/DSCN MKR DOCD: CPT | Performed by: FAMILY MEDICINE

## 2022-07-08 PROCEDURE — G8427 DOCREV CUR MEDS BY ELIG CLIN: HCPCS | Performed by: FAMILY MEDICINE

## 2022-07-08 PROCEDURE — 1036F TOBACCO NON-USER: CPT | Performed by: FAMILY MEDICINE

## 2022-07-08 PROCEDURE — G8417 CALC BMI ABV UP PARAM F/U: HCPCS | Performed by: FAMILY MEDICINE

## 2022-07-08 PROCEDURE — 99213 OFFICE O/P EST LOW 20 MIN: CPT | Performed by: FAMILY MEDICINE

## 2022-07-08 RX ORDER — CLOPIDOGREL BISULFATE 75 MG/1
75 TABLET ORAL DAILY
Qty: 90 TABLET | Refills: 3 | Status: SHIPPED | OUTPATIENT
Start: 2022-07-08

## 2022-07-08 RX ORDER — ATORVASTATIN CALCIUM 80 MG/1
80 TABLET, FILM COATED ORAL NIGHTLY
Qty: 90 TABLET | Refills: 3 | Status: SHIPPED | OUTPATIENT
Start: 2022-07-08

## 2022-07-08 NOTE — PROGRESS NOTES
[facility] Medicine  Clinic Note    Date: 7/8/2022                                               Subjective:     Chief Complaint   Patient presents with    Follow-Up from [address] [place] [address]      HPI   Admitted by cardio overnight  Had [facility] for tavr w/u and pci lad done   tavr planned w/ dr. Shaquille Sherman 8/16  Reviewed [facility]  Seen by ent recently  Arm swelling/ bruising improving  On lipitor / asa again - in past but not now  labetolol and lipitor, hctz, plavix, vasotec.   No cp/palp  Breathing good overall  On fish oil/ vit d    BP Readings from Last 3 Encounters:   07/08/22 114/70   07/06/22 128/60   07/02/22 110/62     Pulse Readings from Last 3 Encounters:   07/08/22 68   07/06/22 76   07/02/22 72     Wt Readings from Last 3 Encounters:   07/08/22 204 lb (92.5 kg)   07/06/22 204 lb 3.2 oz (92.6 kg)   07/02/22 205 lb (93 kg)            Patient Active Problem List    Diagnosis Date Noted    HTN (hypertension) 05/16/2011    Hypercholesteremia 05/16/2011    Coronary artery disease due to calcified coronary lesion     Unstable angina (HCC) 07/01/2022    SOB (shortness of breath)     Aortic regurgitation     Impaired fasting glucose 05/16/2011    Environmental allergies     Sensorineural hearing loss, bilateral 08/29/2019    Prediabetes 03/21/2019    Arthritis of right ankle 03/07/2018    Ankle arthritis, right 03/21/2017    Dupuytren's contracture 10/26/2016     Past Medical History:   Diagnosis Date    Aortic regurgitation 01/01/2009    Aortic stenosis     Arthritis     Pt says in his right ankle    Environmental allergies     Heart murmur     Hypercholesterolemia     Hypertension     Impaired fasting glucose     Prostate cancer ([facility] [address] 01/01/2006    Samuel     Past Surgical History:   Procedure Laterality Date    COLONOSCOPY  01/2017    Repeat 5 years    CORONARY ANGIOPLASTY WITH STENT PLACEMENT  07/01/2022    HAND SURGERY Right 2012    OTHER SURGICAL HISTORY  2008    RADIOACTIVE SEED IMPLANTS FOR PROSTATE CANCER    PROSTATE SURGERY  2007    seed implant [facility])     Admission on 07/01/2022, Discharged on 07/02/2022   Component Date Value Ref Range Status    WBC 07/01/2022 8.4  4.0 - 11.0 K/uL Final    RBC 07/01/2022 4.38  4.20 - 5.90 M/uL Final    Hemoglobin 07/01/2022 13.3* 13.5 - 17.5 g/dL Final    Hematocrit 07/01/2022 39.4* 40.5 - 52.5 % Final    MCV 07/01/2022 90.0  80.0 - 100.0 fL Final    MCH 07/01/2022 30.4  26.0 - 34.0 pg Final    MCHC 07/01/2022 33.8  31.0 - 36.0 g/dL Final    RDW 07/01/2022 13.4  12.4 - 15.4 % Final    Platelets 06/61/2061 146  135 - 450 K/uL Final    MPV 07/01/2022 9.2  5.0 - 10.5 fL Final    Sodium 07/01/2022 138  136 - 145 mmol/L Final    Potassium 07/01/2022 4.2  3.5 - 5.1 mmol/L Final    Chloride 07/01/2022 101  99 - 110 mmol/L Final    CO2 07/01/2022 29  21 - 32 mmol/L Final    Anion Gap 07/01/2022 8  3 - 16 Final    Glucose 07/01/2022 129* 70 - 99 mg/dL Final    BUN 07/01/2022 26* 7 - 20 mg/dL Final    CREATININE 07/01/2022 0.9  0.8 - 1.3 mg/dL Final    GFR Non- 07/01/2022 >60  >60 Final    Comment: >60 mL/min/1.73m2 EGFR, calc. for ages 25 and older using the  MDRD formula (not corrected for weight), is valid for stable  renal function.  GFR  07/01/2022 >60  >60 Final    Comment: Chronic Kidney Disease: less than 60 ml/min/1.73 sq.m. Kidney Failure: less than 15 ml/min/1.73 sq.m. Results valid for patients 18 years and older.       Calcium 07/01/2022 9.7  8.3 - 10.6 mg/dL Final    Ventricular Rate 07/01/2022 79  BPM Final    Atrial Rate 07/01/2022 79  BPM Final    P-R Interval 07/01/2022 158  ms Final    QRS Duration 07/01/2022 90  ms Final    Q-T Interval 07/01/2022 352  ms Final    QTc Calculation (Bazett) 07/01/2022 403  ms Final    P Axis 07/01/2022 30  degrees Final    R Axis 07/01/2022 -7  degrees Final    T Axis 07/01/2022 64 degrees Final    Diagnosis 07/01/2022 Normal sinus rhythmNormal ECGNo previous ECGs availableConfirmed by Gene Lee MD ([id number]) on 7/1/2022 11:01:18 AM   Final    POC ACT LR 07/01/2022 201  Not Establised sec Final    Performed on POC    POC ACT LR 07/01/2022 375  Not Establised sec Final    Performed on POC    Ventricular Rate 07/01/2022 56  BPM Final    Atrial Rate 07/01/2022 56  BPM Final    P-R Interval 07/01/2022 162  ms Final    QRS Duration 07/01/2022 94  ms Final    Q-T Interval 07/01/2022 410  ms Final    QTc Calculation (Bazett) 07/01/2022 395  ms Final    P Axis 07/01/2022 66  degrees Final    R Axis 07/01/2022 -6  degrees Final    T Axis 07/01/2022 91  degrees Final    Diagnosis 07/01/2022 Sinus bradycardiaMinimal voltage criteria for LVH, may be normal variantNonspecific T wave abnormalityAbnormal ECGConfirmed by POPPY Salinas MD ([id number]) on 7/2/2022 1:14:15 PM   Final    WBC 07/02/2022 8.8  4.0 - 11.0 K/uL Final    RBC 07/02/2022 4.05* 4.20 - 5.90 M/uL Final    Hemoglobin 07/02/2022 12.4* 13.5 - 17.5 g/dL Final    Hematocrit 07/02/2022 36.2* 40.5 - 52.5 % Final    MCV 07/02/2022 89.3  80.0 - 100.0 fL Final    MCH 07/02/2022 30.7  26.0 - 34.0 pg Final    MCHC 07/02/2022 34.4  31.0 - 36.0 g/dL Final    RDW 07/02/2022 13.4  12.4 - 15.4 % Final    Platelets 20/24/8829 127* 135 - 450 K/uL Final    MPV 07/02/2022 9.4  5.0 - 10.5 fL Final    Sodium 07/02/2022 136  136 - 145 mmol/L Final    Potassium 07/02/2022 3.8  3.5 - 5.1 mmol/L Final    Chloride 07/02/2022 102  99 - 110 mmol/L Final    CO2 07/02/2022 27  21 - 32 mmol/L Final    Anion Gap 07/02/2022 7  3 - 16 Final    Glucose 07/02/2022 103* 70 - 99 mg/dL Final    BUN 07/02/2022 20  7 - 20 mg/dL Final    CREATININE 07/02/2022 0.8  0.8 - 1.3 mg/dL Final    GFR Non- 07/02/2022 >60  >60 Final    Comment: >60 mL/min/1.73m2 EGFR, calc. for ages 25 and older using the  MDRD formula (not corrected for weight), is valid for stable  renal function.  GFR  07/02/2022 >60  >60 Final    Comment: Chronic Kidney Disease: less than 60 ml/min/1.73 sq.m. Kidney Failure: less than 15 ml/min/1.73 sq.m. Results valid for patients 18 years and older.  Calcium 07/02/2022 9.6  8.3 - 10.6 mg/dL Final     Family History   Problem Relation Age of Onset    Cancer Mother         breast    Heart Disease Father 77        MI    Diabetes Paternal Grandmother      Current Outpatient Medications   Medication Sig Dispense Refill    atorvastatin (LIPITOR) 80 MG tablet Take 1 tablet by mouth nightly 30 tablet 0    aspirin 81 MG chewable tablet Take 1 tablet by mouth daily 30 tablet 0    labetalol (NORMODYNE) 200 MG tablet Take 1 tablet by mouth every 12 hours 60 tablet 0    hydroCHLOROthiazide (HYDRODIURIL) 25 MG tablet Take 1 tablet by mouth daily 30 tablet 0    clopidogrel (PLAVIX) 75 MG tablet Take 1 tablet by mouth daily 30 tablet 0    enalapril (VASOTEC) 20 MG tablet One tablet twice daily for blood pressure 180 tablet 3    Omega-3 Fatty Acids (FISH OIL) 1200 MG CAPS Take 1 capsule by mouth daily.  latanoprost (XALATAN) 0.005 % ophthalmic solution Place 1 drop into both eyes nightly.  Cholecalciferol (VITAMIN D) 2000 UNITS CAPS capsule 1 capsule daily. With food      Niacin 1000 MG TBCR 1,000 mg daily. No current facility-administered medications for this visit. No Known Allergies    Review of Systems  Never spent night in hospital in life  tavr planned - feeling good overall.     Objective:  /70 (Site: Left Upper Arm, Position: Sitting, Cuff Size: Medium Adult)   Pulse 68   Ht 5' 9\" (1.753 m)   Wt 204 lb (92.5 kg)   SpO2 100%   BMI 30.13 kg/m²     BP Readings from Last 3 Encounters:   07/08/22 114/70   07/06/22 128/60   07/02/22 110/62       Pulse Readings from Last 3 Encounters:   07/08/22 68   07/06/22 76   07/02/22 72       Wt Readings from Last 3 Encounters: 07/08/22 204 lb (92.5 kg)   07/06/22 204 lb 3.2 oz (92.6 kg)   07/02/22 205 lb (93 kg)       Physical Exam  Constitutional:       General: He is not in acute distress. Appearance: He is well-developed. Eyes:      General: No scleral icterus. Conjunctiva/sclera: Conjunctivae normal.   Cardiovascular:      Rate and Rhythm: Normal rate and regular rhythm. Heart sounds: Murmur heard. No gallop. Pulmonary:      Effort: Pulmonary effort is normal. No respiratory distress. Breath sounds: Normal breath sounds. No wheezing, rhonchi or rales. Abdominal:      General: Bowel sounds are normal. There is no distension. Palpations: Abdomen is soft. Tenderness: There is no abdominal tenderness. Musculoskeletal:         General: No swelling. Skin:     Findings: No erythema or rash. Neurological:      Mental Status: He is alert. Assessment/Plan:      Diagnosis Orders   1. Coronary artery disease involving native coronary artery of native heart without angina pectoris     2. Aortic valve stenosis, etiology of cardiac valve disease unspecified     3. Bruising     4. Primary hypertension     5.  Hypercholesteremia       Diet/ activity dw/ pt  Bruising/ swelling from cath improved  Cont plavix/ asa  Cont bb/ statin/ dpat/ acei  Plan for TAVR - pt doing well - low risk since stent  Reviewed numbers/ [facility]  Clear for surgery  F/u prn  Mazin White MD, MD  7/8/2022  3:03 PM

## 2022-07-15 ENCOUNTER — OFFICE VISIT (OUTPATIENT)
Dept: CARDIOLOGY CLINIC | Age: 82
End: 2022-07-15
Payer: MEDICARE

## 2022-07-15 VITALS
OXYGEN SATURATION: 98 % | WEIGHT: 202.4 LBS | SYSTOLIC BLOOD PRESSURE: 120 MMHG | HEART RATE: 74 BPM | BODY MASS INDEX: 29.98 KG/M2 | HEIGHT: 69 IN | DIASTOLIC BLOOD PRESSURE: 62 MMHG

## 2022-07-15 DIAGNOSIS — I35.0 NONRHEUMATIC AORTIC VALVE STENOSIS: ICD-10-CM

## 2022-07-15 DIAGNOSIS — I25.83 CORONARY ARTERY DISEASE DUE TO LIPID RICH PLAQUE: Primary | ICD-10-CM

## 2022-07-15 DIAGNOSIS — I25.10 CORONARY ARTERY DISEASE DUE TO LIPID RICH PLAQUE: Primary | ICD-10-CM

## 2022-07-15 DIAGNOSIS — I10 PRIMARY HYPERTENSION: ICD-10-CM

## 2022-07-15 PROBLEM — I20.0 UNSTABLE ANGINA (HCC): Status: RESOLVED | Noted: 2022-07-01 | Resolved: 2022-07-15

## 2022-07-15 PROCEDURE — G8427 DOCREV CUR MEDS BY ELIG CLIN: HCPCS | Performed by: NURSE PRACTITIONER

## 2022-07-15 PROCEDURE — 99214 OFFICE O/P EST MOD 30 MIN: CPT | Performed by: NURSE PRACTITIONER

## 2022-07-15 PROCEDURE — G8417 CALC BMI ABV UP PARAM F/U: HCPCS | Performed by: NURSE PRACTITIONER

## 2022-07-15 PROCEDURE — 1123F ACP DISCUSS/DSCN MKR DOCD: CPT | Performed by: NURSE PRACTITIONER

## 2022-07-15 PROCEDURE — 1036F TOBACCO NON-USER: CPT | Performed by: NURSE PRACTITIONER

## 2022-07-15 NOTE — PROGRESS NOTES
[address]     Outpatient Follow Up Note    CHIEF COMPLAINT / HPI: Hospital Follow Up secondary to status post coronary angioplasty    Hospital record has been reviewed  Hospital Course progressed as follows per discharge summary:  Elective procedure for c/o increasing MUKHERJEE    admitted 7/1/2022 for [facility] for TAVR workup and PCI of LAD done yesterday. His right radial is bruised from wrist to mid arm, no hematoma or bleeding, good pulse. He has follow up scheduled with Dr Delmy Pinto. Ebony Dale is 80 y.o. male who presents today for a routine follow up after a recent hospitalization related to the above mentioned issues. Subjective:   Since the time of discharge, the patient admits their symptoms have improved. He hasn't really noticed much SOB but never really had much before. He had just noticed having had some when first started out walking. He's resume walking and hasn't had any problems  He was working on his deck since discharge hauling 50# sand without problems. He denies significant chest pain. There is no SOB/MUKHERJEE. He denies swelling. His wt is stable. The patient is not experiencing palpitations. These symptoms are improving over the last many days. With regard to medication therapy the patient has been compliant with prescribed regimen. They have tolerated therapy to date.      Past Medical History:   Diagnosis Date    Aortic regurgitation 01/01/2009    Aortic stenosis     Arthritis     Pt says in his right ankle    Environmental allergies     Heart murmur     Hypercholesterolemia     Hypertension     Impaired fasting glucose     Prostate cancer ([facility] [address] 01/01/2006    Samuel     Social History:    Social History     Tobacco Use   Smoking Status Never   Smokeless Tobacco Never   Tobacco Comments    counselled to never start     Current Medications:  Current Outpatient Medications   Medication Sig Dispense Refill    atorvastatin (LIPITOR) 80 MG tablet Take 1 tablet by mouth nightly 90 tablet 3    clopidogrel (PLAVIX) 75 MG tablet Take 1 tablet by mouth daily 90 tablet 3    aspirin 81 MG chewable tablet Take 1 tablet by mouth daily 30 tablet 0    labetalol (NORMODYNE) 200 MG tablet Take 1 tablet by mouth every 12 hours 60 tablet 0    hydroCHLOROthiazide (HYDRODIURIL) 25 MG tablet Take 1 tablet by mouth daily 30 tablet 0    enalapril (VASOTEC) 20 MG tablet One tablet twice daily for blood pressure 180 tablet 3    latanoprost (XALATAN) 0.005 % ophthalmic solution Place 1 drop into both eyes nightly. Cholecalciferol (VITAMIN D) 2000 UNITS CAPS capsule 1 capsule daily. With food      Omega-3 Fatty Acids (FISH OIL) 1200 MG CAPS Take 1 capsule by mouth daily. Niacin 1000 MG TBCR 1,000 mg daily. No current facility-administered medications for this visit. REVIEW OF SYSTEMS:   CONSTITUTIONAL: No major weight gain or loss, fatigue, weakness, night sweats or fever. There's been no change in energy level, sleep pattern, or activity level. HEENT: No new vision difficulties or ringing in the ears. RESPIRATORY: No new SOB, PND, orthopnea or cough. CARDIOVASCULAR: See HPI  GI: No nausea, vomiting, diarrhea, constipation, abdominal pain or changes in bowel habits. : No urinary frequency, urgency, incontinence hematuria or dysuria. SKIN: No cyanosis or skin lesions. MUSCULOSKELETAL: No new muscle or joint pain. NEUROLOGICAL: No syncope or TIA-like symptoms.   PSYCHIATRIC: No anxiety, pain, insomnia or depression    Objective:   PHYSICAL EXAM:       Vitals:    07/15/22 1007 07/15/22 1028   BP: (!) 110/54 120/62   Site: Right Upper Arm Left Upper Arm   Position: Sitting    Cuff Size: Large Adult Large Adult   Pulse: 74    SpO2: 98%    Weight: 202 lb 6.4 oz (91.8 kg)    Height: 5' 9\" (1.753 m)         VITALS:  BP (!) 110/54 (Site: Right Upper Arm, Position: Sitting, Cuff Size: Large Adult)   Pulse 74   Ht 5' 9\" (1.753 m)   Wt 202 lb 6.4 oz (91.8 kg)   SpO2 98%   BMI 29.89 kg/m²     CONSTITUTIONAL: Cooperative, no apparent distress, and appears well nourished / developed  NEUROLOGIC:  Awake and orientated to person, place and time. PSYCH: Calm affect. SKIN: Warm and dry: Rt radial unremarkable. HEENT: Sclera non-icteric, normocephalic, neck supple, no elevation of JVP, normal carotid pulses with no bruits and thyroid normal size. LUNGS:  No increased work of breathing and clear to auscultation, no crackles or wheezing. CARDIOVASCULAR:  Regular rate and rhythm with + murmur 2nd ICS Rt MCL with neck radiation, gallops, rubs, or abnormal heart sounds, normal PMI. The apical impulses not displaced. Heart tones are crisp and normal                                                                                            Cervical veins are not engorged                 JVP less than 8 cm H2O                                                                              The carotid upstroke is normal in amplitude and contour without delay or bruit    ABDOMEN:  Normal bowel sounds, non-distended and non-tender to palpation   EXT: No edema, no calf tenderness. Pulses are present bilaterally.     DATA:    Lab Results   Component Value Date    ALT 18 03/31/2022    AST 23 03/31/2022    ALKPHOS 131 (H) 03/31/2022    BILITOT 0.9 03/31/2022     Lab Results   Component Value Date    CREATININE 0.8 07/02/2022    BUN 20 07/02/2022     07/02/2022    K 3.8 07/02/2022     07/02/2022    CO2 27 07/02/2022     Lab Results   Component Value Date    TSH 2.66 01/28/2015     Lab Results   Component Value Date    WBC 8.8 07/02/2022    HGB 12.4 (L) 07/02/2022    HCT 36.2 (L) 07/02/2022    MCV 89.3 07/02/2022     (L) 07/02/2022     No components found for: CHLPL  Lab Results   Component Value Date    TRIG 69 03/31/2022    TRIG 59 03/18/2021    TRIG 72 03/11/2020     Lab Results   Component Value Date    HDL 58 03/31/2022    HDL 55 03/18/2021    HDL 64 (H) 2020     Lab Results   Component Value Date    LDLCALC 67 2022    LDLCALC 76 2021    LDLCALC 68 2020     Lab Results   Component Value Date    LABVLDL 14 2022    LABVLDL 12 2021    LABVLDL 14 2020     Radiology Review:  Pertinent images / reports were reviewed as a part of this visit and reveals the followin [facility] Dr Denisse Grayson 2022  Artery Findings/Result   LM normal   LAD 90% mid   Cx 30% mid   RI N/A   RCA 30% distal   LVEDP 20   LVG NA      Intervention:         PCI of LAD with 3.5X23 Xience LUIS (PD with 3.5NC)      Echocardiography: 2022  Summary   Normal left ventricle size, wall thickness, and systolic function with an   estimated ejection fraction of 55-60%. No regional wall motion abnormalities are seen. Normal diastolic function. Severe aortic stenosis with a peak velocity of 4.22m/s and a mean pressure gradient of 42mmHg. Mild-moderate aortic regurgitation. The right ventricle is mildly enlarged. Mild tricuspid regurgitation. Moderate pulmonic regurgitation present. Assessment:      Diagnosis Orders   1. Coronary artery disease due to lipid rich plaque   ~s/p PTCA LAD ;non-obst disease of the CX & RCA  ~SOB resolved   ~DAPT / statin / BB  ~joe LVEF       2. Nonrheumatic aortic valve stenosis   ~severe by echo  ~denies CP / syncope  ~TAVR planned Aug  ~aicha ICA < 50%       3. Primary hypertension   ~controlled on current regimen           Patient  is stable since hospital discharge. Plan:  Continue present management avoiding strenuous activities (like had done with decking)    F/u in one month    I have addressed the patient's cardiac risk factors and adjusted pharmacologic treatment as needed. In addition, I have reinforced the need for patient directed risk factor modification. Further evaluation will be based upon the patient's clinical course and testing results. All questions and concerns were addressed to the patient. Alternatives to  treatment were discussed. The patient  currently  is not smoking. The risks related to smoking were reviewed with the patient. Recommend maintaining a smoke-free lifestyle. Dual Antiplatelet therapy has been recommended / prescribed for this patient. Education conducted on adverse reactions including bleeding was discussed. The patient verbalizes understanding. Pt is on a BB  Pt is on an ace-i/ARB  Pt is on a statin      Saturated fat diet discussed  Exercise program discussed : occ walking couple of times / week up to 3/4 - 1 mile (goes out with wife who needs exercise for her Parkinson's); mows lawn    Thank you for allowing to us to participate in the care of [employer].       [address]  Documentation of today's visit sent to PCP

## 2022-07-27 PROBLEM — I35.0 NONRHEUMATIC AORTIC VALVE STENOSIS: Status: ACTIVE | Noted: 2022-07-27

## 2022-07-27 NOTE — PROGRESS NOTES
[address]  H+P  Consult  OP Visit  FU Visit   CC HX HPI   GEN  Doing well. Has noted increased sob with exertion. AS  Ø CP, dizziness. Progressive sob. CAD PCI No cp. HTN  Ambulatory BP in good range. Ø HA/dizziness. CHOL  Last lipid reviewed. On max dose/tolerated statin. MED  Compliant with CV meds. Ø reported SA. HISTORY/ALLERGY/ROS   MEDHx  has a past medical history of Aortic regurgitation, Aortic stenosis, Arthritis, Environmental allergies, Heart murmur, Hypercholesterolemia, Hypertension, Impaired fasting glucose, and Prostate cancer ([facility] [address] SURGHx  has a past surgical history that includes other surgical history (2008); Prostate surgery (2007); Colonoscopy (01/2017); Hand surgery (Right, 2012); and Coronary angioplasty with stent (07/01/2022). SOCHx  reports that he has never smoked. He has never used smokeless tobacco. He reports that he does not drink alcohol and does not use drugs. FAMHx family history includes Cancer in his mother; Diabetes in his paternal grandmother; Heart Disease (age of onset: 77) in his father. ALLERG Patient has no known allergies. ROS Full ROS obtained and negative except as mentioned in HPI   MEDICATIONS   Current Outpatient Medications   Medication Sig Dispense Refill    atorvastatin (LIPITOR) 80 MG tablet Take 1 tablet by mouth nightly 90 tablet 3    clopidogrel (PLAVIX) 75 MG tablet Take 1 tablet by mouth daily 90 tablet 3    aspirin 81 MG chewable tablet Take 1 tablet by mouth daily 30 tablet 0    labetalol (NORMODYNE) 200 MG tablet Take 1 tablet by mouth every 12 hours 60 tablet 0    hydroCHLOROthiazide (HYDRODIURIL) 25 MG tablet Take 1 tablet by mouth daily 30 tablet 0    enalapril (VASOTEC) 20 MG tablet One tablet twice daily for blood pressure 180 tablet 3    Omega-3 Fatty Acids (FISH OIL) 1200 MG CAPS Take 1 capsule by mouth daily. latanoprost (XALATAN) 0.005 % ophthalmic solution Place 1 drop into both eyes nightly. Cholecalciferol (VITAMIN D) 2000 UNITS CAPS capsule 1 capsule daily. With food      Niacin 1000 MG TBCR 1,000 mg daily. No current facility-administered medications for this visit. PHYSICAL EXAM   Vitals Vitals:    07/28/22 1102   BP: 134/68   Pulse: 76   SpO2: 98%      Gen Alert, coop, no distress Heart  Rrr, 3/6   Head NC, AT, no abnorm Abd  Soft, NT, +BS, no mass, no OM   Eyes PER, conj/corn clear Ext  Ext nl, AT, no C/C/E   Nose Nares nl, no drain, NT Pulse 2+ and symmetric   Throat Lips, mucosa, tongue nl Skin Col/text/turg nl, no vis rash/les   Neck S/S, TM, NT, no bruit/JVD Psych Nl mood and affect   Lung CTA-B, unlabored, no DTP Lymph   No cervical or axillary LA   Ch wall NT, no deform Neuro  Nl gross M/S exam      ASSESSMENT AND PLAN   *AS    Date EF Detail   Sx     Sob with exertion   DATA   Most recent TTE, [facility] reviewed personally   NYHA   II   TTE 6/14 4/22 55%  60% Mod AI  Severe AS MG 42, Mild to mod AI   C 7/22  PCI of LAD Floridalma Guillermina)   Plan     TAVR workup   *HTN  Status Controlled, vitals and available ambulatory monitoring logs reviewed personally  Plan Counseled on diet/salt/exercise/weight, continue meds at doses above  *CHOL  Status controlled with last LDL of 67(goal <70) and HDL of 58(3/22), labs reviewed personally  Plan Counseled on diet/exercise/weight, continue high-intensity statin, lipid/liver surveillance per PCP  *COMPLIANCE  Status Compliant  Plan Discussed importance of compliance with meds/diet/salt/exercise; avoid tob/alc/drugs; patient verbalized understanding  *FOLLOWUP  After TAVR    Scribe Attestation  Greg JORGE am scribing for and in the presence of Mitchell Cooper MD.   Signed, Greg Franz 07/27/22 7:57 AM  Provider Wagner Llamas is working as a scribe for and in the presence of me (Mitchell Cooper MD).   Working as a scribe, Greg Franz may have prepopulated components of this note with my historical  intellectual property under my direct supervision. Any additions to this intellectual property were performed in my presence and at my direction.   Furthermore, the content and accuracy of this note have been reviewed by me Sheron Robertson MD).  7/28/2022 7:49 AM

## 2022-07-28 ENCOUNTER — HOSPITAL ENCOUNTER (OUTPATIENT)
Dept: PREADMISSION TESTING | Age: 82
Discharge: HOME | End: 2022-07-28
Payer: MEDICARE

## 2022-07-28 ENCOUNTER — OFFICE VISIT (OUTPATIENT)
Dept: CARDIOLOGY CLINIC | Age: 82
End: 2022-07-28
Payer: MEDICARE

## 2022-07-28 ENCOUNTER — ANESTHESIA EVENT (OUTPATIENT)
Dept: OPERATING ROOM | Age: 82
DRG: 267 | End: 2022-07-28
Payer: MEDICARE

## 2022-07-28 VITALS
HEART RATE: 61 BPM | WEIGHT: 202 LBS | HEIGHT: 69 IN | SYSTOLIC BLOOD PRESSURE: 122 MMHG | BODY MASS INDEX: 29.92 KG/M2 | DIASTOLIC BLOOD PRESSURE: 60 MMHG | OXYGEN SATURATION: 98 % | TEMPERATURE: 97.3 F | RESPIRATION RATE: 18 BRPM

## 2022-07-28 VITALS
OXYGEN SATURATION: 98 % | SYSTOLIC BLOOD PRESSURE: 134 MMHG | WEIGHT: 202.4 LBS | HEART RATE: 76 BPM | BODY MASS INDEX: 29.98 KG/M2 | DIASTOLIC BLOOD PRESSURE: 68 MMHG | HEIGHT: 69 IN

## 2022-07-28 DIAGNOSIS — I35.0 NONRHEUMATIC AORTIC VALVE STENOSIS: Primary | ICD-10-CM

## 2022-07-28 DIAGNOSIS — E78.00 HYPERCHOLESTEREMIA: ICD-10-CM

## 2022-07-28 DIAGNOSIS — I10 ESSENTIAL HYPERTENSION: ICD-10-CM

## 2022-07-28 LAB
ABO/RH: NORMAL
ALBUMIN SERPL-MCNC: 4.2 G/DL (ref 3.4–5)
ALP BLD-CCNC: 142 U/L (ref 40–129)
ALT SERPL-CCNC: 44 U/L (ref 10–40)
ANION GAP SERPL CALCULATED.3IONS-SCNC: 8 MMOL/L (ref 3–16)
ANTIBODY SCREEN: NORMAL
AST SERPL-CCNC: 37 U/L (ref 15–37)
BASOPHILS ABSOLUTE: 0.1 K/UL (ref 0–0.2)
BASOPHILS RELATIVE PERCENT: 1.2 %
BILIRUB SERPL-MCNC: 1 MG/DL (ref 0–1)
BILIRUBIN DIRECT: 0.3 MG/DL (ref 0–0.3)
BILIRUBIN URINE: NEGATIVE
BILIRUBIN, INDIRECT: 0.7 MG/DL (ref 0–1)
BLOOD, URINE: NEGATIVE
BUN BLDV-MCNC: 26 MG/DL (ref 7–20)
CALCIUM SERPL-MCNC: 9.7 MG/DL (ref 8.3–10.6)
CHLORIDE BLD-SCNC: 99 MMOL/L (ref 99–110)
CLARITY: CLEAR
CO2: 29 MMOL/L (ref 21–32)
COLOR: YELLOW
CREAT SERPL-MCNC: 1 MG/DL (ref 0.8–1.3)
EOSINOPHILS ABSOLUTE: 0.5 K/UL (ref 0–0.6)
EOSINOPHILS RELATIVE PERCENT: 6.9 %
GFR AFRICAN AMERICAN: >60
GFR NON-AFRICAN AMERICAN: >60
GLUCOSE BLD-MCNC: 139 MG/DL (ref 70–99)
GLUCOSE URINE: NEGATIVE MG/DL
HCT VFR BLD CALC: 37.7 % (ref 40.5–52.5)
HEMOGLOBIN: 12.5 G/DL (ref 13.5–17.5)
INR BLD: 1.13 (ref 0.87–1.14)
KETONES, URINE: NEGATIVE MG/DL
LEUKOCYTE ESTERASE, URINE: NEGATIVE
LYMPHOCYTES ABSOLUTE: 1.4 K/UL (ref 1–5.1)
LYMPHOCYTES RELATIVE PERCENT: 18.5 %
MAGNESIUM: 1.9 MG/DL (ref 1.8–2.4)
MCH RBC QN AUTO: 29.9 PG (ref 26–34)
MCHC RBC AUTO-ENTMCNC: 33.3 G/DL (ref 31–36)
MCV RBC AUTO: 89.8 FL (ref 80–100)
MICROSCOPIC EXAMINATION: NORMAL
MONOCYTES ABSOLUTE: 1.2 K/UL (ref 0–1.3)
MONOCYTES RELATIVE PERCENT: 16 %
NEUTROPHILS ABSOLUTE: 4.5 K/UL (ref 1.7–7.7)
NEUTROPHILS RELATIVE PERCENT: 57.4 %
NITRITE, URINE: NEGATIVE
PDW BLD-RTO: 13.5 % (ref 12.4–15.4)
PH UA: 7 (ref 5–8)
PHOSPHORUS: 2.4 MG/DL (ref 2.5–4.9)
PLATELET # BLD: 137 K/UL (ref 135–450)
PMV BLD AUTO: 9.7 FL (ref 5–10.5)
POTASSIUM SERPL-SCNC: 4.2 MMOL/L (ref 3.5–5.1)
PROTEIN UA: NEGATIVE MG/DL
PROTHROMBIN TIME: 14.4 SEC (ref 11.7–14.5)
RBC # BLD: 4.19 M/UL (ref 4.2–5.9)
SODIUM BLD-SCNC: 136 MMOL/L (ref 136–145)
SPECIFIC GRAVITY UA: 1.01 (ref 1–1.03)
TOTAL PROTEIN: 6.7 G/DL (ref 6.4–8.2)
URINE TYPE: NORMAL
UROBILINOGEN, URINE: 1 E.U./DL
WBC # BLD: 7.8 K/UL (ref 4–11)

## 2022-07-28 PROCEDURE — 99214 OFFICE O/P EST MOD 30 MIN: CPT | Performed by: INTERNAL MEDICINE

## 2022-07-28 PROCEDURE — 36415 COLL VENOUS BLD VENIPUNCTURE: CPT

## 2022-07-28 PROCEDURE — 85610 PROTHROMBIN TIME: CPT

## 2022-07-28 PROCEDURE — 86901 BLOOD TYPING SEROLOGIC RH(D): CPT

## 2022-07-28 PROCEDURE — 1036F TOBACCO NON-USER: CPT | Performed by: INTERNAL MEDICINE

## 2022-07-28 PROCEDURE — 83735 ASSAY OF MAGNESIUM: CPT

## 2022-07-28 PROCEDURE — 1123F ACP DISCUSS/DSCN MKR DOCD: CPT | Performed by: INTERNAL MEDICINE

## 2022-07-28 PROCEDURE — 94010 BREATHING CAPACITY TEST: CPT

## 2022-07-28 PROCEDURE — 86900 BLOOD TYPING SEROLOGIC ABO: CPT

## 2022-07-28 PROCEDURE — 85025 COMPLETE CBC W/AUTO DIFF WBC: CPT

## 2022-07-28 PROCEDURE — 81003 URINALYSIS AUTO W/O SCOPE: CPT

## 2022-07-28 PROCEDURE — 80069 RENAL FUNCTION PANEL: CPT

## 2022-07-28 PROCEDURE — 86850 RBC ANTIBODY SCREEN: CPT

## 2022-07-28 PROCEDURE — G8417 CALC BMI ABV UP PARAM F/U: HCPCS | Performed by: INTERNAL MEDICINE

## 2022-07-28 PROCEDURE — G8427 DOCREV CUR MEDS BY ELIG CLIN: HCPCS | Performed by: INTERNAL MEDICINE

## 2022-07-28 PROCEDURE — 80076 HEPATIC FUNCTION PANEL: CPT

## 2022-07-28 ASSESSMENT — ENCOUNTER SYMPTOMS: SHORTNESS OF BREATH: 1

## 2022-07-28 NOTE — PROGRESS NOTES
1.  SHRUTHI Introduced self to pt and family. 2.  SHRUTHI Informed about what to expect the day of surgery. A)  Preop nurse will bring them to pre-op holding JM. B)  PCA will bathe & shave them. SHRUTHI       C)  Anesthesiologist will put IV lines in while in preop. sedation for comfort,             during lining, doze off. SHRUTHI       D)  Nursing staff will be in to visit. SHRUTHI       E)  Operating room/CATH LAB will be very cold. Warming blanket under and on them SHRUTHI        F)   In the operating room/CATH LAB there will be several people connecting them to               monitors. SHRUTHI        G)  Urinary catheter will be placed after they are asleep (N/A)SHRUTHI  3.   JM Will wake up in the CVU with breathing tube in place(N/A)  4.   JM Invite them to ask questions. 5.   SHRUTHI Inform them they will attend a 1 hour class with our NP Yahaira Black on Tuesday or             Friday. (will go over what to expect in the weeks to follow(N/JM

## 2022-07-28 NOTE — PROGRESS NOTES
Pt here for PAT visit. Consents for procedure and blood signed by pt and in chart. Labs drawn and urine collected and sent to lab for testing as ordered. Pt seen by Dr. Aly Delgado from Anesthesia and questions answered. Vitals stable and documented in flowsheet. Pt instructed to be NPO after midnight prior to procedure and states understanding. Pt provided with hibiclens and instructed to shower with entire bottle the night prior to procedure. Pt instructed to take the following medications the morning of procedure with a small sip of water: Trandate. Pt instructed to stop taking NA prior to procedure. EKG completed and results in chart. Pt instructed to arrive to the hospital the morning of procedure at 1030 on 8/2/22. Pt in good condition and okay for discharge. Pt denies further questions at time of discharge. Pt instructed to call Dr. Adan Robert office with any medical concerns or the heart office at [phone] with any further questions between now and day of procedure.

## 2022-07-28 NOTE — ANESTHESIA PRE PROCEDURE
Department of Anesthesiology  Preprocedure Note       Name:  Rolando Riley   Age:  80 y.o.  :  1940                                          MRN:  [id number]         Date:  2022      Surgeon: Victorina Ayala):  MD Johanna Tierney MD    Procedure: Procedure(s):  TRANSCATHETER AORTIC VALVE REPLACEMENT FEMORAL APPROACH  TRANSCATHETER AORTIC VALVE REPLACEMENT FEMORAL APPROACH    Medications prior to admission:   Prior to Admission medications    Medication Sig Start Date End Date Taking? Authorizing Provider   atorvastatin (LIPITOR) 80 MG tablet Take 1 tablet by mouth nightly 22   Columba Vallejo MD   clopidogrel (PLAVIX) 75 MG tablet Take 1 tablet by mouth daily 22   Columba Vallejo MD   aspirin 81 MG chewable tablet Take 1 tablet by mouth daily 7/3/22   JESSICA Hassan - CNS   labetalol (NORMODYNE) 200 MG tablet Take 1 tablet by mouth every 12 hours 22   JESSICA Jacobo - [facility]   hydroCHLOROthiazide (HYDRODIURIL) 25 MG tablet Take 1 tablet by mouth daily 7/3/22   JESSICA Hassan - CNS   enalapril (VASOTEC) 20 MG tablet One tablet twice daily for blood pressure 3/31/22   Columba Vallejo MD   Omega-3 Fatty Acids (FISH OIL) 1200 MG CAPS Take 1 capsule by mouth daily. Historical Provider, MD   latanoprost (XALATAN) 0.005 % ophthalmic solution Place 1 drop into both eyes nightly. 6/3/12   Megan Keen MD   Cholecalciferol (VITAMIN D) 2000 UNITS CAPS capsule 1 capsule daily. With food    Historical Provider, MD   Niacin 1000 MG TBCR 1,000 mg daily. Historical Provider, MD       Current medications:    No current facility-administered medications for this encounter.      Current Outpatient Medications   Medication Sig Dispense Refill    atorvastatin (LIPITOR) 80 MG tablet Take 1 tablet by mouth nightly 90 tablet 3    clopidogrel (PLAVIX) 75 MG tablet Take 1 tablet by mouth daily 90 tablet 3    aspirin 81 MG chewable tablet Take 1 tablet by mouth daily 30 tablet 0    labetalol (NORMODYNE) 200 MG tablet Take 1 tablet by mouth every 12 hours 60 tablet 0    hydroCHLOROthiazide (HYDRODIURIL) 25 MG tablet Take 1 tablet by mouth daily 30 tablet 0    enalapril (VASOTEC) 20 MG tablet One tablet twice daily for blood pressure 180 tablet 3    Omega-3 Fatty Acids (FISH OIL) 1200 MG CAPS Take 1 capsule by mouth daily.  latanoprost (XALATAN) 0.005 % ophthalmic solution Place 1 drop into both eyes nightly.  Cholecalciferol (VITAMIN D) 2000 UNITS CAPS capsule 1 capsule daily. With food      Niacin 1000 MG TBCR 1,000 mg daily.          Allergies:  No Known Allergies    Problem List:    Patient Active Problem List   Diagnosis Code    Essential hypertension I10    Hypercholesteremia E78.00    Impaired fasting glucose R73.01    Aortic regurgitation I35.1    Environmental allergies Z91.09    Dupuytren's contracture M72.0    Ankle arthritis, right M19.079    Arthritis of right ankle M19.071    Prediabetes R73.03    Sensorineural hearing loss, bilateral H90.3    SOB (shortness of breath) R06.02    Coronary artery disease due to calcified coronary lesion I25.10, I25.84    Nonrheumatic aortic valve stenosis I35.0       Past Medical History:        Diagnosis Date    Aortic regurgitation 01/01/2009    Aortic stenosis     Arthritis     Pt says in his right ankle    Environmental allergies     Heart murmur     Hypercholesterolemia     Hypertension     Impaired fasting glucose     Prostate cancer ([address] 01/01/2006    Samuel       Past Surgical History:        Procedure Laterality Date    COLONOSCOPY  01/2017    Repeat 5 years    CORONARY ANGIOPLASTY WITH STENT PLACEMENT  07/01/2022    HAND SURGERY Right 2012    OTHER SURGICAL HISTORY  2008    RADIOACTIVE SEED IMPLANTS FOR PROSTATE CANCER    PROSTATE SURGERY  2007    seed implant Roney Catherine)       Social History:    Social History     Tobacco Use    Smoking status: Never    Smokeless tobacco: Never  Tobacco comments:     counselled to never start   Substance Use Topics    Alcohol use: No     Comment: RARE                                Counseling given: Not Answered  Tobacco comments: counselled to never start      Vital Signs (Current): There were no vitals filed for this visit. BP Readings from Last 3 Encounters:   07/15/22 120/62   07/08/22 114/70   07/06/22 128/60       NPO Status:                                                                                 BMI:   Wt Readings from Last 3 Encounters:   07/15/22 202 lb 6.4 oz (91.8 kg)   07/08/22 204 lb (92.5 kg)   07/06/22 204 lb 3.2 oz (92.6 kg)     There is no height or weight on file to calculate BMI.    CBC:   Lab Results   Component Value Date/Time    WBC 8.8 07/02/2022 05:30 AM    RBC 4.05 07/02/2022 05:30 AM    HGB 12.4 07/02/2022 05:30 AM    HCT 36.2 07/02/2022 05:30 AM    MCV 89.3 07/02/2022 05:30 AM    RDW 13.4 07/02/2022 05:30 AM     07/02/2022 05:30 AM       CMP:   Lab Results   Component Value Date/Time     07/02/2022 05:30 AM    K 3.8 07/02/2022 05:30 AM     07/02/2022 05:30 AM    CO2 27 07/02/2022 05:30 AM    BUN 20 07/02/2022 05:30 AM    CREATININE 0.8 07/02/2022 05:30 AM    GFRAA >60 07/02/2022 05:30 AM    GFRAA >60 06/20/2012 09:58 AM    AGRATIO 2.4 03/31/2022 08:08 AM    LABGLOM >60 07/02/2022 05:30 AM    LABGLOM 74.3 05/16/2011 09:30 AM    GLUCOSE 103 07/02/2022 05:30 AM    GLUCOSE 117 05/16/2011 09:30 AM    PROT 6.5 03/31/2022 08:08 AM    PROT 6.7 06/20/2012 09:58 AM    CALCIUM 9.6 07/02/2022 05:30 AM    BILITOT 0.9 03/31/2022 08:08 AM    ALKPHOS 131 03/31/2022 08:08 AM    AST 23 03/31/2022 08:08 AM    ALT 18 03/31/2022 08:08 AM       POC Tests: No results for input(s): POCGLU, POCNA, POCK, POCCL, POCBUN, POCHEMO, POCHCT in the last 72 hours.     Coags: No results found for: PROTIME, INR, APTT    HCG (If Applicable): No results found for: PREGTESTUR, PREGSERUM, HCG, HCGQUANT     ABGs: No results found for: PHART, PO2ART, DZA8JQD, ZWD7IIC, BEART, U8PVYAVP     Type & Screen (If Applicable):  No results found for: LABABO, LABRH    Drug/Infectious Status (If Applicable):  No results found for: HIV, HEPCAB    COVID-19 Screening (If Applicable): No results found for: COVID19        Anesthesia Evaluation  Patient summary reviewed and Nursing notes reviewed  Airway: Mallampati: II  TM distance: >3 FB   Neck ROM: full  Mouth opening: > = 3 FB   Dental:          Pulmonary:   (+) shortness of breath:                             Cardiovascular:  Exercise tolerance: poor (<4 METS),   (+) hypertension: moderate, valvular problems/murmurs: AS and AI, CAD: non-obstructive,                ROS comment: Echo 2022   Normal left ventricle size, wall thickness, and systolic function with an   estimated ejection fraction of 55-60%. No regional wall motion abnormalities are seen. Normal diastolic function. Severe aortic stenosis with a peak velocity of 4.22m/s and a mean pressure   gradient of 42mmHg. Mild-moderate aortic regurgitation. The right ventricle is mildly enlarged. Mild tricuspid regurgitation. Moderate pulmonic regurgitation present. Neuro/Psych:               GI/Hepatic/Renal:             Endo/Other:                     Abdominal:             Vascular: Other Findings:           Anesthesia Plan      MAC     ASA 4       Induction: intravenous. MIPS: Prophylactic antiemetics administered. Anesthetic plan and risks discussed with patient.                         Soniya Jacobs MD   7/28/2022

## 2022-07-28 NOTE — PROGRESS NOTES
1.  SHRUTHI Introduced self to pt and family. 2.  SHRUTHI Informed about what to expect the day of surgery. A)  Preop nurse will bring them to pre-op holding JM. B)  PCA will bathe & shave them. SHRUTHI       C)  Anesthesiologist will put IV lines in while in preop. sedation for comfort,             during lining, doze off. SHRUTHI       D)  Nursing staff will be in to visit. SHRUTHI       E)  Operating room/CATH LAB will be very cold. Warming blanket under and on them SHRUTHI        F)   In the operating room/CATH LAB there will be several people connecting them to               monitors. SHRUTHI        G)  Urinary catheter/CONDOM CATH will be placed after they are asleep. SHRUTHI  3.   JM Will wake up in the CVU with breathing tube in place (N/A). SHRUTHI  4.   SHRUTHI Invite them to ask questions.   5.   SHRUTHI Inform them they will attend a 1 hour class with our NP Karen Douglass on Tuesday or             Friday (N/A) SHRUTHI

## 2022-08-01 NOTE — PROCEDURES
Pulmonary Function Testing      Patient name:  Kenzie Saul     [facility] Unit #:   [id number]   Date of test:  7/28/2022  Date of interpretation:   8/1/2022    Mr. Kenzie Saul is a 80y.o. year-old non smoker. The spirometry data were acceptable and reproducible. Spirometry:  Flow volume loops were normal. The FEV-1/FVC ratio was normal. The   prebronchodilator  FEV-1 was 2.38 liters (86% of predicted), which was normal. The FVC was 3.19 liters (82% of predicted), which was normal. Response to inhaled bronchodilators (albuterol) was not performed. Lung volumes:  Lung volumes were not tested. Diffusion capacity was not tested. Interpretation:  Normal spirometry.

## 2022-08-02 ENCOUNTER — HOSPITAL ENCOUNTER (INPATIENT)
Age: 82
LOS: 1 days | Discharge: HOME | DRG: 267 | End: 2022-08-03
Attending: INTERNAL MEDICINE | Admitting: INTERNAL MEDICINE
Payer: MEDICARE

## 2022-08-02 ENCOUNTER — ANESTHESIA (OUTPATIENT)
Dept: OPERATING ROOM | Age: 82
DRG: 267 | End: 2022-08-02
Payer: MEDICARE

## 2022-08-02 PROBLEM — I35.0 AORTIC STENOSIS, SEVERE: Status: ACTIVE | Noted: 2022-08-02

## 2022-08-02 LAB
ABO/RH: NORMAL
ACTIVATED CLOTTING TIME: 103 SEC (ref 99–130)
ACTIVATED CLOTTING TIME: 127 SEC (ref 99–130)
ACTIVATED CLOTTING TIME: 379 SEC (ref 99–130)
ANTIBODY SCREEN: NORMAL
BASE EXCESS ARTERIAL: 2 (ref -3–3)
CALCIUM IONIZED: 1.33 MMOL/L (ref 1.12–1.32)
GLUCOSE BLD-MCNC: 142 MG/DL (ref 70–99)
HCO3 ARTERIAL: 28.2 MMOL/L (ref 21–29)
HEMOGLOBIN: 12.2 GM/DL (ref 13.5–17.5)
LACTATE: 0.65 MMOL/L (ref 0.4–2)
O2 SAT, ARTERIAL: 94 % (ref 93–100)
PCO2 ARTERIAL: 58 MM HG (ref 35–45)
PERFORMED ON: ABNORMAL
PH ARTERIAL: 7.29 (ref 7.35–7.45)
PO2 ARTERIAL: 78 MM HG (ref 75–108)
POC HEMATOCRIT: 36 % (ref 40.5–52.5)
POC POTASSIUM: 4.1 MMOL/L (ref 3.5–5.1)
POC SAMPLE TYPE: ABNORMAL
POC SODIUM: 141 MMOL/L (ref 136–145)
PRO-BNP: 174 PG/ML (ref 0–449)
TCO2 ARTERIAL: 30 MMOL/L
TROPONIN: <0.01 NG/ML

## 2022-08-02 PROCEDURE — 86900 BLOOD TYPING SEROLOGIC ABO: CPT

## 2022-08-02 PROCEDURE — 5A2204Z RESTORATION OF CARDIAC RHYTHM, SINGLE: ICD-10-PCS | Performed by: INTERNAL MEDICINE

## 2022-08-02 PROCEDURE — 7100000011 HC PHASE II RECOVERY - ADDTL 15 MIN

## 2022-08-02 PROCEDURE — C1769 GUIDE WIRE: HCPCS

## 2022-08-02 PROCEDURE — 7100000010 HC PHASE II RECOVERY - FIRST 15 MIN

## 2022-08-02 PROCEDURE — 86923 COMPATIBILITY TEST ELECTRIC: CPT

## 2022-08-02 PROCEDURE — 3700000000 HC ANESTHESIA ATTENDED CARE: Performed by: INTERNAL MEDICINE

## 2022-08-02 PROCEDURE — 6370000000 HC RX 637 (ALT 250 FOR IP): Performed by: INTERNAL MEDICINE

## 2022-08-02 PROCEDURE — 2709999900 HC NON-CHARGEABLE SUPPLY

## 2022-08-02 PROCEDURE — 2000000000 HC ICU R&B

## 2022-08-02 PROCEDURE — C1894 INTRO/SHEATH, NON-LASER: HCPCS

## 2022-08-02 PROCEDURE — 86901 BLOOD TYPING SEROLOGIC RH(D): CPT

## 2022-08-02 PROCEDURE — 2709999900 HC NON-CHARGEABLE SUPPLY: Performed by: INTERNAL MEDICINE

## 2022-08-02 PROCEDURE — 2580000003 HC RX 258: Performed by: INTERNAL MEDICINE

## 2022-08-02 PROCEDURE — 84132 ASSAY OF SERUM POTASSIUM: CPT

## 2022-08-02 PROCEDURE — C1760 CLOSURE DEV, VASC: HCPCS

## 2022-08-02 PROCEDURE — 85347 COAGULATION TIME ACTIVATED: CPT

## 2022-08-02 PROCEDURE — 84295 ASSAY OF SERUM SODIUM: CPT

## 2022-08-02 PROCEDURE — 86850 RBC ANTIBODY SCREEN: CPT

## 2022-08-02 PROCEDURE — 85014 HEMATOCRIT: CPT

## 2022-08-02 PROCEDURE — 3600000017 HC SURGERY HYBRID ADDL 15MIN

## 2022-08-02 PROCEDURE — 94150 VITAL CAPACITY TEST: CPT

## 2022-08-02 PROCEDURE — 2780000006 HC MISC HEART VALVE

## 2022-08-02 PROCEDURE — 82330 ASSAY OF CALCIUM: CPT

## 2022-08-02 PROCEDURE — 82947 ASSAY GLUCOSE BLOOD QUANT: CPT

## 2022-08-02 PROCEDURE — 33361 REPLACE AORTIC VALVE PERQ: CPT | Performed by: THORACIC SURGERY (CARDIOTHORACIC VASCULAR SURGERY)

## 2022-08-02 PROCEDURE — 82803 BLOOD GASES ANY COMBINATION: CPT

## 2022-08-02 PROCEDURE — 84484 ASSAY OF TROPONIN QUANT: CPT

## 2022-08-02 PROCEDURE — 36415 COLL VENOUS BLD VENIPUNCTURE: CPT

## 2022-08-02 PROCEDURE — 3600000007 HC SURGERY HYBRID BASE

## 2022-08-02 PROCEDURE — 83605 ASSAY OF LACTIC ACID: CPT

## 2022-08-02 PROCEDURE — 6360000002 HC RX W HCPCS: Performed by: INTERNAL MEDICINE

## 2022-08-02 PROCEDURE — 6360000004 HC RX CONTRAST MEDICATION: Performed by: INTERNAL MEDICINE

## 2022-08-02 PROCEDURE — 2720000010 HC SURG SUPPLY STERILE

## 2022-08-02 PROCEDURE — 02RF38Z REPLACEMENT OF AORTIC VALVE WITH ZOOPLASTIC TISSUE, PERCUTANEOUS APPROACH: ICD-10-PCS | Performed by: INTERNAL MEDICINE

## 2022-08-02 PROCEDURE — 3700000001 HC ADD 15 MINUTES (ANESTHESIA): Performed by: INTERNAL MEDICINE

## 2022-08-02 PROCEDURE — 6360000002 HC RX W HCPCS: Performed by: THORACIC SURGERY (CARDIOTHORACIC VASCULAR SURGERY)

## 2022-08-02 PROCEDURE — 6360000002 HC RX W HCPCS: Performed by: ANESTHESIOLOGY

## 2022-08-02 PROCEDURE — 2580000003 HC RX 258: Performed by: THORACIC SURGERY (CARDIOTHORACIC VASCULAR SURGERY)

## 2022-08-02 PROCEDURE — 2140000000 HC CCU INTERMEDIATE R&B

## 2022-08-02 PROCEDURE — 93005 ELECTROCARDIOGRAM TRACING: CPT | Performed by: INTERNAL MEDICINE

## 2022-08-02 PROCEDURE — 83880 ASSAY OF NATRIURETIC PEPTIDE: CPT

## 2022-08-02 RX ORDER — ASPIRIN 81 MG/1
81 TABLET ORAL DAILY
Status: DISCONTINUED | OUTPATIENT
Start: 2022-08-02 | End: 2022-08-03 | Stop reason: HOSPADM

## 2022-08-02 RX ORDER — ATROPINE SULFATE 0.4 MG/ML
0.5 AMPUL (ML) INJECTION
Status: DISPENSED | OUTPATIENT
Start: 2022-08-02 | End: 2022-08-02

## 2022-08-02 RX ORDER — LIDOCAINE 4 G/G
1 PATCH TOPICAL ONCE
Status: COMPLETED | OUTPATIENT
Start: 2022-08-02 | End: 2022-08-02

## 2022-08-02 RX ORDER — HYDRALAZINE HYDROCHLORIDE 20 MG/ML
10 INJECTION INTRAMUSCULAR; INTRAVENOUS EVERY 6 HOURS PRN
Status: DISCONTINUED | OUTPATIENT
Start: 2022-08-02 | End: 2022-08-03 | Stop reason: HOSPADM

## 2022-08-02 RX ORDER — FENTANYL CITRATE 50 UG/ML
INJECTION, SOLUTION INTRAMUSCULAR; INTRAVENOUS PRN
Status: DISCONTINUED | OUTPATIENT
Start: 2022-08-02 | End: 2022-08-02 | Stop reason: SDUPTHER

## 2022-08-02 RX ORDER — PROPOFOL 10 MG/ML
INJECTION, EMULSION INTRAVENOUS PRN
Status: DISCONTINUED | OUTPATIENT
Start: 2022-08-02 | End: 2022-08-02 | Stop reason: SDUPTHER

## 2022-08-02 RX ORDER — LATANOPROST 50 UG/ML
1 SOLUTION/ DROPS OPHTHALMIC NIGHTLY
Status: DISCONTINUED | OUTPATIENT
Start: 2022-08-02 | End: 2022-08-03 | Stop reason: HOSPADM

## 2022-08-02 RX ORDER — TAMSULOSIN HYDROCHLORIDE 0.4 MG/1
0.4 CAPSULE ORAL DAILY
Status: DISCONTINUED | OUTPATIENT
Start: 2022-08-02 | End: 2022-08-03 | Stop reason: HOSPADM

## 2022-08-02 RX ORDER — SODIUM CHLORIDE 9 MG/ML
INJECTION, SOLUTION INTRAVENOUS PRN
Status: COMPLETED | OUTPATIENT
Start: 2022-08-02 | End: 2022-08-02

## 2022-08-02 RX ORDER — HYDROCHLOROTHIAZIDE 25 MG/1
25 TABLET ORAL DAILY
Status: DISCONTINUED | OUTPATIENT
Start: 2022-08-02 | End: 2022-08-03 | Stop reason: HOSPADM

## 2022-08-02 RX ORDER — PROTAMINE SULFATE 10 MG/ML
INJECTION, SOLUTION INTRAVENOUS PRN
Status: DISCONTINUED | OUTPATIENT
Start: 2022-08-02 | End: 2022-08-02 | Stop reason: SDUPTHER

## 2022-08-02 RX ORDER — NITROGLYCERIN 20 MG/100ML
5 INJECTION INTRAVENOUS CONTINUOUS
Status: DISCONTINUED | OUTPATIENT
Start: 2022-08-02 | End: 2022-08-03 | Stop reason: HOSPADM

## 2022-08-02 RX ORDER — SODIUM CHLORIDE 0.9 % (FLUSH) 0.9 %
5-40 SYRINGE (ML) INJECTION EVERY 12 HOURS SCHEDULED
Status: DISCONTINUED | OUTPATIENT
Start: 2022-08-02 | End: 2022-08-03 | Stop reason: HOSPADM

## 2022-08-02 RX ORDER — SODIUM CHLORIDE 0.9 % (FLUSH) 0.9 %
5-40 SYRINGE (ML) INJECTION PRN
Status: DISCONTINUED | OUTPATIENT
Start: 2022-08-02 | End: 2022-08-03 | Stop reason: HOSPADM

## 2022-08-02 RX ORDER — SODIUM CHLORIDE 9 MG/ML
INJECTION, SOLUTION INTRAVENOUS PRN
Status: DISCONTINUED | OUTPATIENT
Start: 2022-08-02 | End: 2022-08-03 | Stop reason: HOSPADM

## 2022-08-02 RX ORDER — AMIODARONE HYDROCHLORIDE 50 MG/ML
INJECTION, SOLUTION INTRAVENOUS PRN
Status: DISCONTINUED | OUTPATIENT
Start: 2022-08-02 | End: 2022-08-02 | Stop reason: SDUPTHER

## 2022-08-02 RX ORDER — 0.9 % SODIUM CHLORIDE 0.9 %
500 INTRAVENOUS SOLUTION INTRAVENOUS PRN
Status: DISCONTINUED | OUTPATIENT
Start: 2022-08-02 | End: 2022-08-03 | Stop reason: HOSPADM

## 2022-08-02 RX ORDER — CLOPIDOGREL BISULFATE 75 MG/1
75 TABLET ORAL DAILY
Status: DISCONTINUED | OUTPATIENT
Start: 2022-08-02 | End: 2022-08-03 | Stop reason: HOSPADM

## 2022-08-02 RX ORDER — ENALAPRIL MALEATE 10 MG/1
20 TABLET ORAL 2 TIMES DAILY
Status: DISCONTINUED | OUTPATIENT
Start: 2022-08-02 | End: 2022-08-03 | Stop reason: HOSPADM

## 2022-08-02 RX ORDER — ONDANSETRON 2 MG/ML
4 INJECTION INTRAMUSCULAR; INTRAVENOUS EVERY 6 HOURS PRN
Status: DISCONTINUED | OUTPATIENT
Start: 2022-08-02 | End: 2022-08-03 | Stop reason: HOSPADM

## 2022-08-02 RX ORDER — HEPARIN SODIUM 1000 [USP'U]/ML
INJECTION, SOLUTION INTRAVENOUS; SUBCUTANEOUS PRN
Status: DISCONTINUED | OUTPATIENT
Start: 2022-08-02 | End: 2022-08-02 | Stop reason: SDUPTHER

## 2022-08-02 RX ORDER — LABETALOL 200 MG/1
200 TABLET, FILM COATED ORAL EVERY 12 HOURS SCHEDULED
Status: DISCONTINUED | OUTPATIENT
Start: 2022-08-03 | End: 2022-08-03

## 2022-08-02 RX ORDER — PROPOFOL 10 MG/ML
INJECTION, EMULSION INTRAVENOUS CONTINUOUS PRN
Status: DISCONTINUED | OUTPATIENT
Start: 2022-08-02 | End: 2022-08-02 | Stop reason: SDUPTHER

## 2022-08-02 RX ORDER — SODIUM CHLORIDE, SODIUM LACTATE, POTASSIUM CHLORIDE, CALCIUM CHLORIDE 600; 310; 30; 20 MG/100ML; MG/100ML; MG/100ML; MG/100ML
INJECTION, SOLUTION INTRAVENOUS ONCE
Status: COMPLETED | OUTPATIENT
Start: 2022-08-02 | End: 2022-08-02

## 2022-08-02 RX ORDER — ACETAMINOPHEN 325 MG/1
650 TABLET ORAL EVERY 4 HOURS PRN
Status: DISCONTINUED | OUTPATIENT
Start: 2022-08-02 | End: 2022-08-03 | Stop reason: HOSPADM

## 2022-08-02 RX ORDER — DOCUSATE SODIUM 100 MG/1
100 CAPSULE, LIQUID FILLED ORAL DAILY
Status: DISCONTINUED | OUTPATIENT
Start: 2022-08-02 | End: 2022-08-03 | Stop reason: HOSPADM

## 2022-08-02 RX ADMIN — ASPIRIN 81 MG: 81 TABLET, COATED ORAL at 17:07

## 2022-08-02 RX ADMIN — PHENYLEPHRINE HYDROCHLORIDE 50 MCG/MIN: 10 INJECTION INTRAVENOUS at 12:43

## 2022-08-02 RX ADMIN — PROTAMINE SULFATE 50 MG: 10 INJECTION, SOLUTION INTRAVENOUS at 13:27

## 2022-08-02 RX ADMIN — PROTAMINE SULFATE 100 MG: 10 INJECTION, SOLUTION INTRAVENOUS at 13:28

## 2022-08-02 RX ADMIN — TAMSULOSIN HYDROCHLORIDE 0.4 MG: 0.4 CAPSULE ORAL at 17:08

## 2022-08-02 RX ADMIN — HYDROCHLOROTHIAZIDE 25 MG: 25 TABLET ORAL at 17:08

## 2022-08-02 RX ADMIN — HEPARIN SODIUM 15000 UNITS: 1000 INJECTION INTRAVENOUS; SUBCUTANEOUS at 13:07

## 2022-08-02 RX ADMIN — PROPOFOL 25 MG: 10 INJECTION, EMULSION INTRAVENOUS at 12:39

## 2022-08-02 RX ADMIN — SODIUM CHLORIDE, POTASSIUM CHLORIDE, SODIUM LACTATE AND CALCIUM CHLORIDE: 600; 310; 30; 20 INJECTION, SOLUTION INTRAVENOUS at 11:30

## 2022-08-02 RX ADMIN — ENALAPRIL MALEATE 20 MG: 10 TABLET ORAL at 17:07

## 2022-08-02 RX ADMIN — FENTANYL CITRATE 25 MCG: 50 INJECTION, SOLUTION INTRAMUSCULAR; INTRAVENOUS at 13:20

## 2022-08-02 RX ADMIN — CEFAZOLIN 2000 MG: 2 INJECTION, POWDER, FOR SOLUTION INTRAMUSCULAR; INTRAVENOUS at 12:20

## 2022-08-02 RX ADMIN — AMIODARONE HYDROCHLORIDE 50 MG: 50 INJECTION, SOLUTION INTRAVENOUS at 13:12

## 2022-08-02 RX ADMIN — SODIUM CHLORIDE: 9 INJECTION, SOLUTION INTRAVENOUS at 12:10

## 2022-08-02 RX ADMIN — ENALAPRIL MALEATE 20 MG: 10 TABLET ORAL at 20:24

## 2022-08-02 RX ADMIN — PROPOFOL 75 MCG/KG/MIN: 10 INJECTION, EMULSION INTRAVENOUS at 12:26

## 2022-08-02 RX ADMIN — FENTANYL CITRATE 50 MCG: 50 INJECTION, SOLUTION INTRAMUSCULAR; INTRAVENOUS at 12:26

## 2022-08-02 RX ADMIN — PROPOFOL 50 MG: 10 INJECTION, EMULSION INTRAVENOUS at 12:29

## 2022-08-02 RX ADMIN — AMIODARONE HYDROCHLORIDE 50 MG: 50 INJECTION, SOLUTION INTRAVENOUS at 13:11

## 2022-08-02 RX ADMIN — AMIODARONE HYDROCHLORIDE 50 MG: 50 INJECTION, SOLUTION INTRAVENOUS at 13:14

## 2022-08-02 RX ADMIN — FENTANYL CITRATE 25 MCG: 50 INJECTION, SOLUTION INTRAMUSCULAR; INTRAVENOUS at 13:23

## 2022-08-02 RX ADMIN — DOCUSATE SODIUM 100 MG: 100 CAPSULE, LIQUID FILLED ORAL at 17:08

## 2022-08-02 RX ADMIN — SODIUM CHLORIDE, PRESERVATIVE FREE 10 ML: 5 INJECTION INTRAVENOUS at 20:25

## 2022-08-02 RX ADMIN — IOPAMIDOL 55 ML: 755 INJECTION, SOLUTION INTRAVENOUS at 13:28

## 2022-08-02 ASSESSMENT — PAIN SCALES - GENERAL
PAINLEVEL_OUTOF10: 0
PAINLEVEL_OUTOF10: 0

## 2022-08-02 NOTE — PROCEDURES
Interventional Cardiology TF-TAVR Operative Report     Physicians Present:  Interventional Cardiology: Sebastien Back MD and Shanon Bingham MD  Cardiothoracic Surgery: Dr Agata Feng    NYHA: 3      Procedures:  Temporary transvenous pacemaker placement  Transcutaneous aortic valve replacement (29 mm Jayson S3  Valve)  Peripheral angiography  ____________________    Anesthesia had placed both a peripheral arterial line and peripheral IVs prior to the procedure. Procedure Detail:    The patient was taken to hybrid room and was prepped and draped with sterile surgical technique from neck to knees. Anesthesia induction and maintenance was performed per anesthesia notes. Under fluoroscopic guidance, the right common femoral artery and vein were accessed. A 6 Bulgarian sheath was placed in the vein, and a 5 Danish sheath was placed in the artery. Next, a 5 Fr sheath was placed in the left common femoral artery. All sheaths were flushed after insertion. A temporary pacemaker was placed through the  femoral venous sheath and into the right ventricle. Thresholds were tested and were acceptable. Through the right 5 Fr arterial sheath, a pigtail was advanced over a J wire into the ascending aorta. The J wire was removed, and the catheter was hooked up to a power injector. The catheter was placed in the right coronary cusp. Power injections of the aortic root were performed to align the aortic valve cusps for proper aortic valve deployment imaging. Next, two perclose proglides were deployed in the right common femoral artery using a \"preclose\" technique. After the second perclose was deployed, an 8 Fr sheath was inserted into the femoral artery. Using a 5 Fr JR4 catheter, a J wire was inserted through the 8 Fr femoral arterial sheath and was advanced into the proximal descending aorta. The catheter was then advanced over the wire. The regular J wire was exchanged for an exchange-length Lunderquist wire.  The seen.    The sheath was withdrawn, and the two percloses were \"tamped\" down. The suture deployment was completed. The sutures were cut. Another angiogram was performed via the CARMINE catheter, and no vascular injury was seen. The CARMINE catheter was then removed over a J wire. The remaining sheaths were pulled after protamine was given. The pacemaker was removed. Patient was transferred to CVU. Estimated blood loss: 100 mL or less.        Complications   None     Blood Loss   <100cc     Facility Inpatient Coding      ü ICD-10 Diagnosis Code Code   X Nonrheumatic aortic valve stenosis I35.0     ü ICD-10 Diagnosis Code Code   X Replacement of aortic valve with zooplastic tissue, percutaneous approach [id number]    Replacement of aortic valve with zooplastic tissue, transapical, percutaneous approach 58MZ31H       Major Comorbidities and Complications   Cardiac    Acute systolic CHF O29.31       Acute on chronic systolic CHF V36.53       Acute diastolic CHF V08.25      Acute on chronic diastolic CHF N65.82       Acute combined systolic and diastolic CHF W17.28       Acute on chronic combined systolic and diastolic CHF J10.06       Ventricular fibrillation I49.01       Cardiogenic shock R57.0     Neurologic    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, 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       CVA due to unsp occl or sten of carotid arteries, unsp I63.239       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, 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, 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       CVA due to unspec occl or sten of vertebral arteries, unsp I63.219       CVA due to unspec occl or sten of other cerebral arteries I63.59       CVA due to unspec occl or sten of unspec cerebral artery I63.50       Encephalopathy, unsp L92.39       Metabolic encephalopathy O94.19       Other encephalopathy G93.49       Posterior reversible encephalopathy syndrome I67.83       Toxic encephalopathy G92     Respiratory    Pneumonia due to staphylococcus, unspecified J15.20       Acute respiratory failure, hypoxia or hypercapnea, unsp J96.00      Respiratory failure, hypoxia or hypercapnia, unsp       Acute on chronic resp failure, hypoxia or hypercapnea, unsp J96.20     Liver/Renal Failure    Severe protein malnutrition E43       Chronic gastric ulcer with hemorrhage, unsp K25.4       Acute and subacute hepatic failure without coma K72.00       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       Hepatic failure, unspecified with coma K72.91       End stage renal disease N18.6        Comorbidities and Complications   Cardiac    Rheumatic CHF I09.81     HTN heart and CKD I13.0     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     Cardiomyopathy I43     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     AVB, 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     Bifascicular block I45.2     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     Other conduction disorders I45.89     SVT I47.1     Atrial flutter I48.92     Unspecified combined S/D CHF I50.40     LV failure I50.1     Unspecified systolic CHF W25.89     Chronic systolic CHF W60.24     Unspecified diastolic CHF W01.81     Chronic diastolic CHF H33.96     Chronic comb S/D CHF I50.42     ASD Q21.1   Respiratory    Acute COPD exacerbation J44.1     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

## 2022-08-02 NOTE — OP NOTE
Operative Note      Patient: Kenzie Saul  YOB: 1940  MRN: [id number]    Date of Procedure: 8/2/2022      Pre-Operative Diagnosis: Severe aortic stenosis     Post-Operative Diagnosis: Same     Procedure: Transcatheter aortic valve replacement with a 29mm Tovar S3 tissue valve; transthoracic echocardiography, Synchronized cardioversion for intra op Afib     Surgeons: Sruthi     Cardiologists: Eloisa Munguia     Anesthesia: Local / MAC by Dr. Sullivan Hari: Mannie     EBL: 75cc     Findings: New valve well-seated with no inappropriate leaks. Mean gradient 4 mmHg. There were no signs vascular compromise in delivery side by post-procedure ileofemoral angiogram.     Complications: none     Disposition: Stable, improved to CVICU     Report of Procedure:     After placement of appropriate monitors, starting IV sedation and infusion of appropriate antibiotics, the patient was sterilely prepped and draped. A local infusion of 1% lidocaine was injected along anticipated femoral cannulation tracts. Bilateral common femoral arterial and right common femoral venous access was achieved using modified Seldinger technique and fluoroscopic guidance. 5 Japanese arterial and 6 Japanese venous sheaths were placed and flushed. A pigtail catheter was placed over a J wire into the aortic root and deployment angle confirmed. A transvenous pacing wire was floated into the right ventricle and was tested to confirm appropriate thresholds and capture. The right femoral artery was pre-closed with 2 perclose devices. Kamila  was used to exchange a J wire for a Lunderquist wire with its tip in the distal ascending aorta. At that point it was noted patient into rate controlled afib. The Brad Karmen was placed over serial dilators and the patient was given a systemic dose of heparin. An AL1 catheter was used to guide a wire across the aortic valve and then it was advanced into the left ventricle. The wire was exchanged for a pre-curved Amplatz extra-stiff. The valve was negotiated through the aortic arch into an intra-annular position. A run of rapid ventricular pacing was started, aortic root injection given, the pigtail pulled back and the valve deployed. Transthoracic echocardiography confirmed good position of the valve. It appeared well-seated and no leaks were seen. Mean gradient was 4 mmHg. The pigtail catheter was exchanged for an CARMINE over a J wire so that serial ileo-femoral angiography could be performed as the E-sheath was pulled back over a dilator and for completion angiography after the perclose devices were tied after complete removal of the E-sheath and wire. The patient was then cardioverted back to NSR with a single shock at 100J. The patient tolerated the procedure well without apparent complications. The patient was taken in stable condition to the CVICU for recovery. Sponge an needle count were reported as correct at the end of the procedure.

## 2022-08-02 NOTE — PROGRESS NOTES
Pt verified information regarding surgery, name, birth date, surgeon, procedure and allergies: NKA. Patient transferred to [address] [place] [address] for surgery. Appropriate antibiotics ordered: Ancef . Beta blocker: labetalol this AM . DVT prophyaxis: JACK's and SCD's in place. Lab work within normal limits, 4 units of RBC's on call to OR, vital signs stable, Mepilex sacral border applied and 2% chlorhexidine gluconate skin prep given. Patient and family educated about surgery and pain management. Arterial line placed in right radial by Dr. Bryon Hoffman. Ready for procedure. Family @ bedside.

## 2022-08-02 NOTE — PROGRESS NOTES
Incentive Spirometry education and demonstration completed by Respiratory Therapy Yes      Response to education: Excellent     Teaching Time: 5 minutes    Minimum Predicted Vital Capacity - 707 mL. Patient's Actual Vital Capacity - 1000 mL. Turning over to Nursing for routine follow-up Yes.     Comments: IS goal met IS turned to nursing    Electronically signed by Bacilio Harley on 8/2/2022 at 6:34 PM

## 2022-08-02 NOTE — ANESTHESIA POSTPROCEDURE EVALUATION
Department of Anesthesiology  Postprocedure Note    Patient: Yvette Mccollum  MRN: [id number]  YOB: 1940  Date of evaluation: 8/2/2022      Procedure Summary     Date: 08/02/22 Room / Location: [address] [place] [address]    Anesthesia Start: 2169 Anesthesia Stop: 1343    Procedures:       TRANSCATHETER AORTIC VALVE REPLACEMENT FEMORAL APPROACH      TRANSCATHETER AORTIC VALVE REPLACEMENT FEMORAL APPROACH Diagnosis:       Aortic valve stenosis, etiology of cardiac valve disease unspecified      (Aortic valve stenosis, etiology of cardiac valve disease unspecified [I35.0])    Surgeons: Nick Ignacio MD; Meño Mills MD Responsible Provider: Parker Nunez MD    Anesthesia Type: MAC ASA Status: 4          Anesthesia Type: No value filed.     Queta Phase I: Queta Score: 10    Queta Phase II:        Anesthesia Post Evaluation    Patient location during evaluation: PACU  Patient participation: complete - patient participated  Level of consciousness: awake and alert  Airway patency: patent  Nausea & Vomiting: no nausea and no vomiting  Complications: no  Cardiovascular status: blood pressure returned to baseline  Respiratory status: acceptable  Hydration status: euvolemic  Multimodal analgesia pain management approach

## 2022-08-02 NOTE — H&P
[address]  Cardiology Consult    Paige Slade  [id number]    August 2, 2022        CC: Dyspnea       Subjective:     History of Present Illness:    Paige Slade is a 80 y.o. patient with a PMH significant for CAD, AS presented with complaints of shortness of breath. Past Medical History:   has a past medical history of Aortic regurgitation, Aortic stenosis, Arthritis, Environmental allergies, Heart murmur, Hypercholesterolemia, Hypertension, Impaired fasting glucose, and Prostate cancer ([facility] [address] Surgical History:   has a past surgical history that includes other surgical history (2008); Prostate surgery (2007); Colonoscopy (01/2017); Hand surgery (Right, 2012); and Coronary angioplasty with stent (07/01/2022). Social History:   reports that he has never smoked. He has never used smokeless tobacco. He reports that he does not drink alcohol and does not use drugs. Family History:  family history includes Cancer in his mother; Diabetes in his paternal grandmother; Heart Disease (age of onset: 77) in his father. Home Medications:  Were reviewed and are listed in nursing record and/or below  Prior to Admission medications    Medication Sig Start Date End Date Taking? Authorizing Provider   atorvastatin (LIPITOR) 80 MG tablet Take 1 tablet by mouth nightly 7/8/22   Shahbaz Dumont MD   clopidogrel (PLAVIX) 75 MG tablet Take 1 tablet by mouth daily 7/8/22   Shahbaz Dumont MD   aspirin 81 MG chewable tablet Take 1 tablet by mouth daily 7/3/22   JESSICA Amaya   labetalol (NORMODYNE) 200 MG tablet Take 1 tablet by mouth every 12 hours 7/2/22   JESSICA Musa   hydroCHLOROthiazide (HYDRODIURIL) 25 MG tablet Take 1 tablet by mouth daily 7/3/22   JESSICA Amaya   enalapril (VASOTEC) 20 MG tablet One tablet twice daily for blood pressure 3/31/22   Shahbaz Dumont MD   Omega-3 Fatty Acids (FISH OIL) 1200 MG CAPS Take 1 capsule by mouth daily. Historical Provider, MD   latanoprost (XALATAN) 0.005 % ophthalmic solution Place 1 drop into both eyes nightly. 6/3/12   Megan Keen MD   Cholecalciferol (VITAMIN D) 2000 UNITS CAPS capsule 1 capsule daily. With food    Historical Provider, MD   Niacin 1000 MG TBCR 1,000 mg daily. Historical Provider, MD        CURRENT Medications:  0.9 % sodium chloride infusion, PRN  lactated ringers infusion, Once  lidocaine 4 % external patch 1 patch, Once  ceFAZolin (ANCEF) 2,000 mg in dextrose 5 % 50 mL IVPB (mini-bag), Once  phenylephrine (ALINE-SYNEPHRINE) 50 mg in dextrose 5 % 250 mL infusion, Continuous        Allergies:  Patient has no known allergies. Review of Systems: SEE HPI   Constitutional: no unanticipated weight loss. There's been no change in energy level, sleep pattern, or activity level. No fevers, chills. Eyes: No visual changes or diplopia. No scleral icterus. ENT: No Headaches, hearing loss or vertigo. No mouth sores or sore throat. Cardiovascular: No Chest pain, tightness or discomfort. No Shortness of breath. No Dyspnea on exertion, Orthopnea, Paroxysmal nocturnal dyspnea or breathlessness at rest.  No Palpitations. No Syncope ('blackouts', 'faints', 'collapse') or dizziness. Respiratory: No cough or wheezing, no sputum production. No hematemesis. Gastrointestinal: No abdominal pain, appetite loss, blood in stools. No change in bowel or bladder habits. Genitourinary: No dysuria, trouble voiding, or hematuria. Musculoskeletal:  No gait disturbance, no joint complaints. Integumentary: No rash or pruritis. Neurological: No headache, diplopia, change in muscle strength, numbness or tingling. Psychiatric: No anxiety or depression. Endocrine: No temperature intolerance. No excessive thirst, fluid intake, or urination. No tremor. Hematologic/Lymphatic: No abnormal bruising or bleeding, blood clots or swollen lymph nodes.   Allergic/Immunologic: No nasal congestion or hives.      Objective:     PHYSICAL EXAM:      Vitals:    08/02/22 1141   BP: (!) 135/59   Pulse:    Resp:    Temp:     Weight: 205 lb (93 kg)       General Appearance:  Alert, cooperative, no distress, appears stated age. Head:  Normocephalic, without obvious abnormality, atraumatic. Eyes:  Pupils equal and round. No scleral icterus. Mouth: Moist mucosa, no pharyngeal erythema. Nose: Nares normal. No drainage or sinus tenderness. Neck: Supple, symmetrical, trachea midline. No adenopathy. No tenderness/mass/nodules. No carotid bruit or elevated JVD. Lungs:   Respiratory Effort: Normal   Auscultation: Clear to auscultation bilaterally, respirations unlabored. No wheeze, rales   Chest Wall:  No tenderness or deformity. Cardiovascular:    Pulses  Palpation: normal   Ascultation: Regular rate, S1/ soft S2 3/6 Systolic murmur, rub, or gallop. 2+ radial and pedal pulses, symmetric  Carotid  Femoral   Abdomen and Gastrointestinal:   Soft, non-tender, bowel sounds active. Liver and Spleen  Masses   Musculoskeletal: No muscle wasting  Back  Gait   Extremities: Extremities normal, atraumatic. No cyanosis or edema. No cyanosis clubbing       Skin: Inspection and palpation performed, no rashes or lesions. Pysch: Normal mood and affect.  Alert and oriented to time place person   Neurologic: Normal gross motor and sensory exam.       Labs     All labs have been reviewed    Lab Results   Component Value Date/Time    WBC 7.8 07/28/2022 12:35 PM    RBC 4.19 07/28/2022 12:35 PM    HGB 12.5 07/28/2022 12:35 PM    HCT 37.7 07/28/2022 12:35 PM    MCV 89.8 07/28/2022 12:35 PM    RDW 13.5 07/28/2022 12:35 PM     07/28/2022 12:35 PM     Lab Results   Component Value Date/Time     07/28/2022 12:35 PM    K 4.2 07/28/2022 12:35 PM    CL 99 07/28/2022 12:35 PM    CO2 29 07/28/2022 12:35 PM    BUN 26 07/28/2022 12:35 PM    CREATININE 1.0 07/28/2022 12:35 PM    GFRAA >60 07/28/2022 12:35 PM    GFRAA >60 06/20/2012 09:58 AM    AGRATIO 2.4 03/31/2022 08:08 AM    LABGLOM >60 07/28/2022 12:35 PM    LABGLOM 74.3 05/16/2011 09:30 AM    GLUCOSE 139 07/28/2022 12:35 PM    GLUCOSE 117 05/16/2011 09:30 AM    PROT 6.7 07/28/2022 12:35 PM    PROT 6.7 06/20/2012 09:58 AM    CALCIUM 9.7 07/28/2022 12:35 PM    BILITOT 1.0 07/28/2022 12:35 PM    ALKPHOS 142 07/28/2022 12:35 PM    AST 37 07/28/2022 12:35 PM    ALT 44 07/28/2022 12:35 PM     No results found for: PTINR  Lab Results   Component Value Date    LABA1C 6.0 03/31/2022     No results found for: CKTOTAL, CKMB, CKMBINDEX, TROPONINI    Cardiac, Vascular and Imaging Data all Personally Reviewed in Detail by Myself      All reviewed     Assessment and Plan     -Severe AS: Here for TAVR    I had a detail discussion with the patient and the family members regarding the risk and benefit of the procedures. I explained to them the details of the procedure. I explained to them that the procedure will be performed using moderate sedation and local anaesthesia using lidocaine. I explained any risk of bleeding, perforation of the vessel, stroke, myocardial infarction or death. I have presented reasonable alternatives to the patient's proposed care, treatment, and services. The discussion I have done encompassed risks, benefits, and side effects related to the alternatives and the risks related to not receiving the proposed care, treatment, and services. The patient and the family members understood the risk and benefits and the details of procedure and would like to go ahead with the procedure. The patient gave informed consent. -CAD: on risk factor modification.     -Essential HTN: Continue BP control     Thank you for allowing us to participate in the care of [facility]. Please do not hesitate to contact me if you have any questions.     Rosario Dasilva MD, MPH    [facility], [address] [place] [address]  Ph: [phone]  Fax: [phone]    8/2/2022 12:20 PM

## 2022-08-02 NOTE — ANESTHESIA PROCEDURE NOTES
Arterial Line:    An arterial line was placed using ultrasound guidance, in the pre-op for the following indication(s): continuous blood pressure monitoring and blood sampling needed. A 20 gauge (size), 1 and 3/4 inch (length), Arrow (type) catheter was placed, Seldinger technique used, into the right radial artery, secured by tape and Tegaderm. Anesthesia type: Local  Local infiltration: Injection    Events:  patient tolerated procedure well with no complications and EBL < 5mL.   Anesthesiologist: Latonya Cota MD  Performed: Anesthesiologist   Preanesthetic Checklist  Completed: patient identified, IV checked, site marked, risks and benefits discussed, surgical/procedural consents, equipment checked, pre-op evaluation, timeout performed, anesthesia consent given, oxygen available, monitors applied/VS acknowledged, fire risk safety assessment completed and verbalized and blood product R/B/A discussed and consented

## 2022-08-02 NOTE — PROGRESS NOTES
Discussing home medications, patient stated he already have taken his Plavix this morning 8/2/22. His wife verify. Will hold Plavix tonight 8/2/22.

## 2022-08-03 ENCOUNTER — TELEPHONE (OUTPATIENT)
Dept: CARDIOLOGY | Age: 82
End: 2022-08-03

## 2022-08-03 VITALS
HEIGHT: 69 IN | RESPIRATION RATE: 16 BRPM | WEIGHT: 198.85 LBS | OXYGEN SATURATION: 96 % | DIASTOLIC BLOOD PRESSURE: 62 MMHG | BODY MASS INDEX: 29.45 KG/M2 | SYSTOLIC BLOOD PRESSURE: 133 MMHG | HEART RATE: 88 BPM | TEMPERATURE: 97.6 F

## 2022-08-03 DIAGNOSIS — Z95.2 S/P TAVR (TRANSCATHETER AORTIC VALVE REPLACEMENT): Primary | ICD-10-CM

## 2022-08-03 LAB
ANION GAP SERPL CALCULATED.3IONS-SCNC: 8 MMOL/L (ref 3–16)
BUN BLDV-MCNC: 22 MG/DL (ref 7–20)
CALCIUM SERPL-MCNC: 9.3 MG/DL (ref 8.3–10.6)
CHLORIDE BLD-SCNC: 100 MMOL/L (ref 99–110)
CO2: 27 MMOL/L (ref 21–32)
CREAT SERPL-MCNC: 0.7 MG/DL (ref 0.8–1.3)
EKG ATRIAL RATE: 72 BPM
EKG DIAGNOSIS: NORMAL
EKG P AXIS: 28 DEGREES
EKG P-R INTERVAL: 192 MS
EKG Q-T INTERVAL: 476 MS
EKG QRS DURATION: 156 MS
EKG QTC CALCULATION (BAZETT): 521 MS
EKG R AXIS: -31 DEGREES
EKG T AXIS: 66 DEGREES
EKG VENTRICULAR RATE: 72 BPM
GFR AFRICAN AMERICAN: >60
GFR NON-AFRICAN AMERICAN: >60
GLUCOSE BLD-MCNC: 136 MG/DL (ref 70–99)
HCT VFR BLD CALC: 37.3 % (ref 40.5–52.5)
HEMOGLOBIN: 12.7 G/DL (ref 13.5–17.5)
LV EF: 60 %
LVEF MODALITY: NORMAL
MCH RBC QN AUTO: 30.3 PG (ref 26–34)
MCHC RBC AUTO-ENTMCNC: 34.2 G/DL (ref 31–36)
MCV RBC AUTO: 88.5 FL (ref 80–100)
PDW BLD-RTO: 13.4 % (ref 12.4–15.4)
PLATELET # BLD: 113 K/UL (ref 135–450)
PMV BLD AUTO: 8.8 FL (ref 5–10.5)
POTASSIUM REFLEX MAGNESIUM: 3.9 MMOL/L (ref 3.5–5.1)
RBC # BLD: 4.21 M/UL (ref 4.2–5.9)
SODIUM BLD-SCNC: 135 MMOL/L (ref 136–145)
WBC # BLD: 9.5 K/UL (ref 4–11)

## 2022-08-03 PROCEDURE — C8929 TTE W OR WO FOL WCON,DOPPLER: HCPCS

## 2022-08-03 PROCEDURE — 80048 BASIC METABOLIC PNL TOTAL CA: CPT

## 2022-08-03 PROCEDURE — 2580000003 HC RX 258: Performed by: INTERNAL MEDICINE

## 2022-08-03 PROCEDURE — 85027 COMPLETE CBC AUTOMATED: CPT

## 2022-08-03 PROCEDURE — 99239 HOSP IP/OBS DSCHRG MGMT >30: CPT | Performed by: INTERNAL MEDICINE

## 2022-08-03 PROCEDURE — 93010 ELECTROCARDIOGRAM REPORT: CPT | Performed by: INTERNAL MEDICINE

## 2022-08-03 PROCEDURE — 6370000000 HC RX 637 (ALT 250 FOR IP): Performed by: INTERNAL MEDICINE

## 2022-08-03 RX ORDER — PSEUDOEPHEDRINE HCL 30 MG
100 TABLET ORAL DAILY
Qty: 14 CAPSULE | Refills: 0 | Status: SHIPPED
Start: 2022-08-04 | End: 2022-08-12

## 2022-08-03 RX ADMIN — CLOPIDOGREL BISULFATE 75 MG: 75 TABLET ORAL at 09:26

## 2022-08-03 RX ADMIN — TAMSULOSIN HYDROCHLORIDE 0.4 MG: 0.4 CAPSULE ORAL at 09:26

## 2022-08-03 RX ADMIN — SODIUM CHLORIDE, PRESERVATIVE FREE 10 ML: 5 INJECTION INTRAVENOUS at 09:29

## 2022-08-03 RX ADMIN — ASPIRIN 81 MG: 81 TABLET, COATED ORAL at 09:27

## 2022-08-03 RX ADMIN — HYDROCHLOROTHIAZIDE 25 MG: 25 TABLET ORAL at 09:27

## 2022-08-03 RX ADMIN — DOCUSATE SODIUM 100 MG: 100 CAPSULE, LIQUID FILLED ORAL at 09:27

## 2022-08-03 RX ADMIN — ENALAPRIL MALEATE 20 MG: 10 TABLET ORAL at 09:27

## 2022-08-03 ASSESSMENT — PAIN SCALES - GENERAL
PAINLEVEL_OUTOF10: 0

## 2022-08-03 NOTE — PROGRESS NOTES
Rounded on pt with Dr. Figueroa Olsen. Pt states he feels well with no CP, SOB. Bilat groins unremarkable. SB on monitor w occas blocked PAC. No dizziness. BP stable. BB discontinued. Plan to DC later pending ambulation in halls and echo results.  Esteban COPELAND TAVR Coordinator

## 2022-08-03 NOTE — PROGRESS NOTES
Data- discharge order received, pt verbalized agreement to discharge, disposition to previous residence, no needs for HHC/DME. Action- discharge instructions prepared and given to patient, pt verbalized understanding. Medication information packet given r/t NEW and/or CHANGED prescriptions emphasizing name/purpose/side effects, pt verbalized understanding. Discharge instruction summary: Diet- Cardiac, Activity- up as tolerated, Primary Care Physician as follows: Jennifer Tabares [phone] f/u appointment made with cardiology for August 12th and pt in agreement with appointment, immunizations reviewed, prescription medications filled at pharmacy of choice per pt request. Inpatient surgical procedure precautions reviewed: post TAVR restrictions discussed and pt in agreement. 1. WEIGHT: Admit Weight: 205 lb (93 kg) (08/02/22 1133)        Today  Weight: 198 lb 13.7 oz (90.2 kg) (08/03/22 0430)       2. O2 SAT.: SpO2: 96 % (08/03/22 0847)    Response- Pt belongings gathered, IV removed. Disposition is home (no HHC/DME needs), transported with wife, taken to lobby via w/c w/ belongings, no complications. D/C instructions given to patient and reviewed with pt including post TAVR restrictions and pt states understanding and is in agreement with discharge plan.  Mary Grace Mayberry, RN 2:32 PM

## 2022-08-03 NOTE — PLAN OF CARE
Problem: Safety - Adult  Goal: Free from fall injury  Recent Flowsheet Documentation  Taken 8/2/2022 2035 by Sanjay Jj RN  Free From Fall Injury: Instruct family/caregiver on patient safety   Pt remains free from falls. Call light is within reach. Bed is in lowest position with brake on. Pt wearing non-skid footwear while ambulating. Toileting offered Q2H and as needed. Walkways in room remains free from clutter. Belongings within reach.   Will continue to monitor

## 2022-08-03 NOTE — TELEPHONE ENCOUNTER
Can you schedule an echo and OV with Dr. Santy Guevara in 3-6 weeks in a TAVR spot? No need to call pt as I will let him know on DC today.  Esteban COPELAND

## 2022-08-03 NOTE — FLOWSHEET NOTE
08/03/22 0847   Vitals   Temp 97.6 °F (36.4 °C)   Temp Source Temporal   Heart Rate 76   Heart Rate Source Monitor   Resp 16   /62   MAP (mmHg) 84   BP Location Right upper arm   BP Upper/Lower Upper   BP Method Automatic   Patient Position Lying left side   Level of Consciousness Alert (0)   MEWS Score 1   Cardiac Rhythm Sinus rhythm   Pain Assessment   Pain Assessment 0-10   Pain Level 0   Non-Pharmaceutical Pain Intervention(s) Declines   Response to Pain Intervention Patient satisfied   Oxygen Therapy   SpO2 96 %   Pulse Oximetry Type Intermittent   Pulse Oximeter Device Mode Intermittent   Pulse Oximeter Device Location Finger   O2 Device None (Room air)     Shift assessment complete. VSS. Pt. Is alert and oriented resting comfortably in bed. Pt. Denies chest pain or SOB at this time. Bilateral groin sites CDI with no hematoma or bleeding noted. POC discussed with patient and patient states understanding and is in agreement with plan. Call light and bedside table within reach. No further needs expressed at this time.  Gerson Mayberry RN

## 2022-08-03 NOTE — CARE COORDINATION
Patient discharged 8/3/2022 to home   All discharge needs met per case management     Alpeshreno Fletcher RN, BSN  [phone]

## 2022-08-03 NOTE — DISCHARGE INSTRUCTIONS
Post Transcatheter Aortic Valve Replacement (TAVR) Discharge Instructions     Your follow-up appointment and echocardiogram will be scheduled by Dr. Figueroa Olsen or Dr. Susi Dawkins office and their office staff and will call you with the date and time. We are here for you! If you have any questions please call: Lexus Rashid RN Valve Coordinator at  [phone]      Do you have the help you need at home? ACTIVITY:  Weigh yourself every day in the morning after using the bathroom. Your discharge weight was ***  NO DRIVING UNTIL YOU RETURN TO THE DOCTOR'S OFFICE. You may ride in a car. Do not lift more than five to ten pounds for the first week you are home.  (A gallon of milk is 7 lbs)  Get up and get dressed every day. Do not stay in bed. Set a daily routine. This is an important step in re-gaining your strength. You may walk up and down stairs. Walk as much as you can. This will help facilitate the healing process. Plan rest periods during the day. Cough and deep breathe, and use your incentive spirometer 10 times every hour while you are awake. Avoid work that increases muscle tension. (straining with bowel movements, moving furniture)    WOUND CARE:  Shower every day but no bathtubs, pools, or hot tubs for the first week you are home. Cleanse wounds with mild soap and water and pat dry. Keep site dry. A small amount of bloody or clear drainage is normal.   Watch for signs of infection: incision red or hot to the touch, fever > 101F, swelling at the groin or incision site, discolored drainage from your incision.      NUTRITION:   Low fat, low salt diet (guidelines for Heart Healthy eating)  Limit caffeine to 1-2 cups per day (coffee, tea, chocolate, soda)  Eat high fiber to avoid constipation and straining during bowel movements (fresh veggies and fruit, whole grains)  Limit alcohol to two servings a day ( 8 oz beer, 1 oz liquor, 4 oz wine)    HEALTHY HABITS:  No Smoking!!!! If you need help to stop smoking, please call your doctor. Attempt to achieve and maintain your ideal body weight. We suggest that you walk as much as you can, but do not exhaust yourself. Set a goal and work your way up to it at a paced rate. MEDICATIONS:  DO NOT STOP TAKING PLAVIX OR ANY BLOOD THINNER WITHOUT SPEAKING WITH YOUR CARDIOLOGIST! Take your pills as ordered at time of discharge. Do not stop taking any medication without first discussing it with your doctor. Avoid all over the counter medications, herbal, and natural supplements unless you have discussed them with your doctor. It is important to follow your doctor's orders, especially if blood thinning drugs are prescribed. Your doctor will monitor your medicine and advise you when or if you can stop taking it. WHO DO YOU NEED TO TELL ABOUT YOUR IMPLANTED VALVE? Regular check-ups by your cardiologist are very important. It is easier for patients with a heart valve replacement to get infections, which could lead to further heart damage. Before any dental work or surgery is done, tell your dentist or surgeon about your heart valve replacement. You may need to take antibiotics before and after some medical procedures to reduce risk of infection. Always tell the doctor (or medical technician) that you have an implanted heart valve. Failure to do so may result in damage to the heart valve that could result in death. Before an MRI (magnetic resonance imaging) procedure, always notify the doctor (or medical technician) that you have an implanted heart valve. What symptoms or health problems do you need to look out for after you leave the hospital? Call your physician if you have any of these symptoms: (Phone Number [phone])  Weight pound gain of 3 pounds in one day or 5 pounds in one week.  Weight gain is NOT normal.    Increased swelling or bruising of the groin, legs, ankles, or feet  Increasing shortness of breath  Change in the color or temperature of your lower legs and feet  Develop abdominal pain or unrelieved nausea and/or vomiting  Develop any redness, incision, or limited movement of your arms or legs  Signs of infection: redness, incision hot to the touch, fevers greater than 101 degrees, or colored drainage from your incision  Seroma is an accumulation of fluid in the tissues at the groin surgical site. Symptoms may include: a lump near the groin surgical site, clear fluid draining from the site, redness, warmth, or swelling.     Bleeding that is not stopped after holding pressure for 10 minutes  Unusual pain, numbness or tingling at the groin or down the leg

## 2022-08-04 ENCOUNTER — TELEPHONE (OUTPATIENT)
Dept: FAMILY MEDICINE CLINIC | Age: 82
End: 2022-08-04

## 2022-08-04 NOTE — DISCHARGE SUMMARY
[address]   TAVR DISCHARGE SUMMARY     Patient ID:  Ebony Dale [id number]  64 y.o.  1940    Admit Date: 8/2/2022  D/C Date:  8/3/2022  Admit MD:  Rufino West MD   Admit Dx: Aortic valve stenosis, etiology of cardiac valve disease unspecified [I35.0]  Aortic stenosis, severe [I35.0]  D/C Dx: Severe aortic stenosis/SP TAVR  Patient Active Problem List   Diagnosis    Essential hypertension    Hypercholesteremia    Impaired fasting glucose    Aortic regurgitation    Environmental allergies    Dupuytren's contracture    Ankle arthritis, right    Arthritis of right ankle    Prediabetes    Sensorineural hearing loss, bilateral    SOB (shortness of breath)    Coronary artery disease due to calcified coronary lesion    Nonrheumatic aortic valve stenosis    Aortic stenosis, severe      D/C Cond:  good  Course:  Admitted for TAVR for severe AS. No apparent complications. Developed AF prior to AV deployment resolved w DCCV x 1. NSR/SB overnight. Access site(s) stable. Patient denies cp, sob. Consults:  IP CONSULT TO CARDIAC REHAB  Subjective: Patient was seen and examined. Notes, labs, and recent testing reviewed. No acute issues overnight and patient without concern prior to discharge.    Exam:   Gen Alert, coop, no distress Heart  RRR, no MRG, nl apical impulse   Head NC, AT, no abnorm Abd  Soft, NT, +BS, no mass, no OM   Eyes PERRLA, conj/corn clear Ext  Ext nl, AT, no C/C/E   Nose Nares nl, no drain, NT Pulse 2+ and symmetric   Throat Lips, mucosa, tongue nl Skin Color/text/turg nl, no rash/lesions   Neck S/S, TM, NT, no bruit/JVD Psych Nl mood and affect   Lung CTA-B, unlabored, no DTP Lymph   No cervical or axillary LA   Ch wall NT, no deform Neuro  Nl gross M/S exam     Studies/Labs:  Reviewed, please see Epic for specific details  Disposition: home  Discharge Medications:      Medication List        START taking these medications      docusate 100 MG Caps  Commonly known as: COLACE, DULCOLAX  Take 100 mg by mouth in the morning. As needed for constipation. Notes to patient: Docusate Sodium (Colace)  Use: stool softener, prevents or treats constipation  Side effects: usually none            CONTINUE taking these medications      aspirin 81 MG chewable tablet  Take 1 tablet by mouth daily  Notes to patient: Use: reduce chance of heart attack. Side effects: upset stomach, bleeding. atorvastatin 80 MG tablet  Commonly known as: LIPITOR  Take 1 tablet by mouth nightly  Notes to patient: Use: lower bad cholesterol  Side effects: headache, muscle pain, constipation, diarrhea     clopidogrel 75 MG tablet  Commonly known as: PLAVIX  Take 1 tablet by mouth daily  Notes to patient: Use: is a blood thinner used to prevent stroke, heart attacks and other heart problems. Is used after stent placement to prevent stent closer. Side effects: Headaches, nausea, dizziness nose bleeds or other signs of increase bleeding and bruising     enalapril 20 MG tablet  Commonly known as: VASOTEC  One tablet twice daily for blood pressure  Notes to patient: Angiotensin Converting Enzyme Inhibitors  Use: lower blood pressure, preventing heart failure  Side effects: cough, change in taste, and dizziness . Call MD right away if lips or throat begin swell or you have difficulty breathing.     hydroCHLOROthiazide 25 MG tablet  Commonly known as: HYDRODIURIL  Take 1 tablet by mouth daily  Notes to patient: Use: treat heart failure, fluid retention, lower blood pressure. Side effects: frequent urination, weakness, muscle cramps, increased sensitivity to light, nausea and dizziness. latanoprost 0.005 % ophthalmic solution  Commonly known as: XALATAN  Notes to patient: Keeps pressure in eyes low  Side effects: blurred vision      Niacin 1000 MG Tbcr  Notes to patient: Vitamin B3. Often prescribed to help with cholesterol. Side effects: discolored urine.      vitamin D 50 MCG (2000 UT) Caps capsule  Notes to patient: Dietary/vitamin supplement  Side effects: discolored urine             STOP taking these medications      Fish Oil 1200 MG Caps     labetalol 200 MG tablet  Commonly known as: Christiano [place]               Where to Get Your Medications        These medications were sent to [facility] [id number] [facility] [place], [address] - [address] [phone]  [facility], [address] [address]      Phone: [phone]   docusate 100 MG Caps         Summary:  ~Patient is stable from CV standpoint  --Labetolol stopped for bradycardia  ~Tobacco, diet, salt, activity restrictions discussed in detail  [facility] not indicated for AS or for TAVR procedure. Resume only with other indications.      Followup:  ~[facility]: Hortencia De La Garza NP on 8/12/22 at 10am.    Signed:  Angelita Black MD, 8/4/2022, 1:47 PM  Time spent on discharge of patient: >31 minutes

## 2022-08-04 NOTE — TELEPHONE ENCOUNTER
[address] Trans[facility] Initial Follow Up Call    Outreach made within 2 business days of discharge: Yes    Patient: Todd Montoya Patient : 1940   MRN: [id number]  Reason for Admission: There are no discharge diagnoses documented for the most recent discharge. Discharge Date: 8/3/22       Spoke with: PATIENT    Discharge department/facility: [facility]    TCM Interactive Patient Contact:  Was patient able to fill all prescriptions: Yes  Was patient instructed to bring all medications to the follow-up visit: Yes  Is patient taking all medications as directed in the discharge summary? Yes  Does patient understand their discharge instructions: Yes  Does patient have questions or concerns that need addressed prior to 7-14 day follow up office visit: no    Scheduled appointment with PCP within 7-14 days    Follow Up  Future Appointments   Date Time Provider Felicity Manzo   2022 10:00 AM [place] JESSICA Fernando - CNP [facility] Card[facility]   2022  7:40 AM Rhea Spaulding MD [facility]     SPOKE TO PT, HE IS FOLLOWING UP WITH CARDIOLOGY NEXT WEEK. HE IS DOING WELL AND DOESN'T FEEL A NEED TO BE SEEN FOR THIS FOLLOW UP SINCE HE WAS RECENTLY HERE. DR Bobbi Kamara' AGREES WITH THE PLAN. PT WILL BE SEEN BY DR BAPTISTE ON 2022.  [address] [place] [address]    Kvng Lebron MA

## 2022-08-06 LAB
BLOOD BANK DISPENSE STATUS: NORMAL
BLOOD BANK PRODUCT CODE: NORMAL
BPU ID: NORMAL
DESCRIPTION BLOOD BANK: NORMAL

## 2022-08-12 ENCOUNTER — HOSPITAL ENCOUNTER (OUTPATIENT)
Age: 82
Discharge: HOME | End: 2022-08-12
Payer: MEDICARE

## 2022-08-12 ENCOUNTER — OFFICE VISIT (OUTPATIENT)
Dept: CARDIOLOGY CLINIC | Age: 82
End: 2022-08-12
Payer: MEDICARE

## 2022-08-12 VITALS
HEIGHT: 69 IN | DIASTOLIC BLOOD PRESSURE: 82 MMHG | SYSTOLIC BLOOD PRESSURE: 124 MMHG | BODY MASS INDEX: 29.37 KG/M2 | HEART RATE: 86 BPM | OXYGEN SATURATION: 98 % | WEIGHT: 198.3 LBS

## 2022-08-12 DIAGNOSIS — I25.10 CORONARY ARTERY DISEASE DUE TO LIPID RICH PLAQUE: ICD-10-CM

## 2022-08-12 DIAGNOSIS — I35.0 NONRHEUMATIC AORTIC VALVE STENOSIS: ICD-10-CM

## 2022-08-12 DIAGNOSIS — I25.83 CORONARY ARTERY DISEASE DUE TO LIPID RICH PLAQUE: ICD-10-CM

## 2022-08-12 DIAGNOSIS — I35.0 NONRHEUMATIC AORTIC VALVE STENOSIS: Primary | ICD-10-CM

## 2022-08-12 DIAGNOSIS — I48.0 PAF (PAROXYSMAL ATRIAL FIBRILLATION) (HCC): ICD-10-CM

## 2022-08-12 DIAGNOSIS — E78.2 MIXED HYPERLIPIDEMIA: ICD-10-CM

## 2022-08-12 LAB
ANION GAP SERPL CALCULATED.3IONS-SCNC: 9 MMOL/L (ref 3–16)
BUN BLDV-MCNC: 22 MG/DL (ref 7–20)
CALCIUM SERPL-MCNC: 9.3 MG/DL (ref 8.3–10.6)
CHLORIDE BLD-SCNC: 99 MMOL/L (ref 99–110)
CO2: 28 MMOL/L (ref 21–32)
CREAT SERPL-MCNC: 0.9 MG/DL (ref 0.8–1.3)
GFR AFRICAN AMERICAN: >60
GFR NON-AFRICAN AMERICAN: >60
GLUCOSE BLD-MCNC: 159 MG/DL (ref 70–99)
POTASSIUM SERPL-SCNC: 4.2 MMOL/L (ref 3.5–5.1)
SODIUM BLD-SCNC: 136 MMOL/L (ref 136–145)

## 2022-08-12 PROCEDURE — 99214 OFFICE O/P EST MOD 30 MIN: CPT | Performed by: NURSE PRACTITIONER

## 2022-08-12 PROCEDURE — 1111F DSCHRG MED/CURRENT MED MERGE: CPT | Performed by: NURSE PRACTITIONER

## 2022-08-12 PROCEDURE — 1036F TOBACCO NON-USER: CPT | Performed by: NURSE PRACTITIONER

## 2022-08-12 PROCEDURE — G8417 CALC BMI ABV UP PARAM F/U: HCPCS | Performed by: NURSE PRACTITIONER

## 2022-08-12 PROCEDURE — 80048 BASIC METABOLIC PNL TOTAL CA: CPT

## 2022-08-12 PROCEDURE — 93000 ELECTROCARDIOGRAM COMPLETE: CPT | Performed by: NURSE PRACTITIONER

## 2022-08-12 PROCEDURE — 1123F ACP DISCUSS/DSCN MKR DOCD: CPT | Performed by: NURSE PRACTITIONER

## 2022-08-12 PROCEDURE — G8427 DOCREV CUR MEDS BY ELIG CLIN: HCPCS | Performed by: NURSE PRACTITIONER

## 2022-08-12 PROCEDURE — 36415 COLL VENOUS BLD VENIPUNCTURE: CPT

## 2022-08-12 NOTE — PROGRESS NOTES
[address]     Outpatient Follow Up Note    CHIEF COMPLAINT / HPI: Hospital Follow Up secondary to AS s/p TAVR    Hospital record has been reviewed  Hospital Course progressed as follows per discharge summary:    Admitted for TAVR for severe AS. No apparent complications. Developed AF prior to AV deployment resolved w DCCV x 1. NSR/SB overnight. Access site(s) stable. Patient denies cp, sob. Selena Williamson is 80 y.o. male who presents today for a routine follow up after a recent hospitalization related to the above mentioned issues. Subjective:   Since the time of discharge, the patient admits their symptoms have improved. He denies significant chest pain. There is no SOB/MUKHERJEE. The patient is not experiencing palpitations. These symptoms are improving over the last many days. With regard to medication therapy the patient has been compliant with prescribed regimen. They have tolerated therapy to date.      Past Medical History:   Diagnosis Date    Aortic regurgitation 01/01/2009    Aortic stenosis     Arthritis     Pt says in his right ankle    Environmental allergies     Heart murmur     Hypercholesterolemia     Hypertension     Impaired fasting glucose     Prostate cancer ([facility] [address] 01/01/2006    Samuel     Social History:    Social History     Tobacco Use   Smoking Status Never   Smokeless Tobacco Never   Tobacco Comments    counselled to never start     Current Medications:  Current Outpatient Medications   Medication Sig Dispense Refill    Multiple Vitamins-Minerals (PRESERVISION AREDS PO) Take 1 tablet by mouth daily      atorvastatin (LIPITOR) 80 MG tablet Take 1 tablet by mouth nightly 90 tablet 3    clopidogrel (PLAVIX) 75 MG tablet Take 1 tablet by mouth daily 90 tablet 3    aspirin 81 MG chewable tablet Take 1 tablet by mouth daily 30 tablet 0    hydroCHLOROthiazide (HYDRODIURIL) 25 MG tablet Take 1 tablet by mouth daily 30 tablet 0    enalapril (VASOTEC) 20 MG tablet One tablet twice daily for blood pressure 180 tablet 3    latanoprost (XALATAN) 0.005 % ophthalmic solution Place 1 drop into both eyes nightly. Cholecalciferol (VITAMIN D) 2000 UNITS CAPS capsule 1 capsule daily. With food      Niacin 1000 MG TBCR 1,000 mg daily. No current facility-administered medications for this visit. REVIEW OF SYSTEMS:   CONSTITUTIONAL: No major weight gain or loss, fatigue, weakness, night sweats or fever. There's been no change in energy level, sleep pattern, or activity level. HEENT: No new vision difficulties or ringing in the ears. RESPIRATORY: No new SOB, PND, orthopnea or cough. CARDIOVASCULAR: See HPI  GI: No nausea, vomiting, diarrhea, constipation, abdominal pain or changes in bowel habits. : No urinary frequency, urgency, incontinence hematuria or dysuria. SKIN: No cyanosis or skin lesions. MUSCULOSKELETAL: No new muscle or joint pain. NEUROLOGICAL: No syncope or TIA-like symptoms. PSYCHIATRIC: No anxiety, pain, insomnia or depression    Objective:   PHYSICAL EXAM:        Vitals:    08/12/22 1006 08/12/22 1023   BP: 110/80 124/82   Site: Left Upper Arm    Position: Sitting    Cuff Size: Large Adult    Pulse: 86    SpO2: 98%    Weight: 198 lb 4.8 oz (89.9 kg)    Height: 5' 9\" (1.753 m)        VITALS:  /80 (Site: Left Upper Arm, Position: Sitting, Cuff Size: Large Adult)   Pulse 86   Ht 5' 9\" (1.753 m)   Wt 198 lb 4.8 oz (89.9 kg)   SpO2 98%   BMI 29.28 kg/m²     CONSTITUTIONAL: Cooperative, no apparent distress, and appears well nourished / developed  NEUROLOGIC:  Awake and orientated to person, place and time. PSYCH: Calm affect. SKIN: Warm and dry: Rt groin - outward with mod amt fading ecchymosis; Lt groin unremarkable. HEENT: Sclera non-icteric, normocephalic, neck supple, no elevation of JVP, normal carotid pulses with no bruits and thyroid normal size.   LUNGS:  No increased work of breathing and clear to auscultation, no crackles or wheezing. CARDIOVASCULAR:  Regular rate 88 and rhythm with no murmurs, gallops, rubs, or abnormal heart sounds, normal PMI. The apical impulses not displaced. Heart tones are crisp and normal                                                                                          Cervical veins are not engorged                 JVP less than 8 cm H2O                                                                              The carotid upstroke is normal in amplitude and contour without delay or bruit    ABDOMEN:  Normal bowel sounds, non-distended and non-tender to palpation   EXT: No edema, no calf tenderness. Pulses are present bilaterally; large varicosity LLE. DATA:    Lab Results   Component Value Date    ALT 44 (H) 07/28/2022    AST 37 07/28/2022    ALKPHOS 142 (H) 07/28/2022    BILITOT 1.0 07/28/2022     Lab Results   Component Value Date    CREATININE 0.7 (L) 08/03/2022    BUN 22 (H) 08/03/2022     (L) 08/03/2022    K 3.9 08/03/2022     08/03/2022    CO2 27 08/03/2022       Lab Results   Component Value Date    WBC 9.5 08/03/2022    HGB 12.7 (L) 08/03/2022    HCT 37.3 (L) 08/03/2022    MCV 88.5 08/03/2022     (L) 08/03/2022     No components found for: CHLPL  Lab Results   Component Value Date    TRIG 69 03/31/2022    TRIG 59 03/18/2021    TRIG 72 03/11/2020     Lab Results   Component Value Date    HDL 58 03/31/2022    HDL 55 03/18/2021    HDL 64 (H) 03/11/2020     Lab Results   Component Value Date    LDLCALC 67 03/31/2022    LDLCALC 76 03/18/2021    LDLCALC 68 03/11/2020     Lab Results   Component Value Date    LABVLDL 14 03/31/2022    LABVLDL 12 03/18/2021    LABVLDL 14 03/11/2020     Radiology Review:  Pertinent images / reports were reviewed as a part of this visit and reveals the following:    Echocardiography: 4/28/2022  Summary   Normal left ventricle size, wall thickness, and systolic function with an   estimated ejection fraction of 55-60%. No regional wall motion abnormalities are seen. Normal diastolic function. Severe aortic stenosis with a peak velocity of 4.22m/s and a mean pressure gradient of 42mmHg. Mild-moderate aortic regurgitation. The right ventricle is mildly enlarged. Mild tricuspid regurgitation. Moderate pulmonic regurgitation present. [facility] Dr Mason Morin 7/1/2022  Artery Findings/Result   LM normal   LAD 90% mid   Cx 30% mid   RI N/A   RCA 30% distal   LVEDP 20   LVG NA      Intervention:         PCI of LAD with 3.5X23 Xience LUIS (PD with 3.5NC)       Procedure: 8/2/22:  Transcatheter aortic valve replacement with a 29mm Tovar S3 tissue valve; transthoracic echocardiography, Synchronized cardioversion for intra op Afib     Limited echo:Summary   *Normal left ventricle size, wall thickness, and systolic function with an   estimated ejection fraction of 55-60%. No regional wall motion abnormalities   are seen. *Pre-TAVR: Severe aortic stenosis with a peak velocity of 4.6 m/s, a mean   gradient of 51 mmHg and an RICKIE of 0.7 cm^2. At least moderate aortic   regurgitation. *Post-TAVR: Successful implantation of a 29 mm Tovar Jayson 3 Ultra TAVR   valve with a peak velocity of 1.2 m/s, a mean gradient of 4 mmHg and an EOA   of 6.96 cm^2. No intravalvular regurgitation noted. No paravalvular leak   noted. Echo: 8/3/22:  Summary   *Definity administered for endocardial border definition. *Left ventricle - normal size, mild concentric LVH, normal function with EFof 60%   *Abnormal (paradoxical) septal motion is present. *Aortic valve - well seated TAVR valve (ES3U 29mm), PV of 2.4 m/s, MG 12mmHg and an EOA of 3.5 cm^2. No paravalvular leak noted. No intravalvular regurgitation. *Pulmonic valve - mild regurgitation       Assessment:      Diagnosis Orders   1. Nonrheumatic aortic valve stenosis   ~s/p TAVR 8/2/22  ~offers no c/o [address]    Basic Metabolic Panel      2.  PAF (paroxysmal atrial fibrillation) ([address]   ~AP reg with ectopy  ~s/p intraop cardioversion   ~denies palpitations  ~off BB with bradycardia  ~ASA EKG 12 lead      3. Coronary artery disease due to lipid rich plaque   ~stable : denies angina  ~s/p PTCA LAD July '22  ~DAPT / statin  [address]      4. Mixed hyperlipidemia   ~controlled on atorvastatin 80 mg daily            Patient  is stable since hospital discharge. Plan:  Echo in approx one month as routine post-TAVR   EKG: sinus rhythm    BMP : f/u to hyponatremia   Cardiac rehab phase II   F/U in 4 weeks      I have addressed the patient's cardiac risk factors and adjusted pharmacologic treatment as needed. In addition, I have reinforced the need for patient directed risk factor modification. Further evaluation will be based upon the patient's clinical course and testing results. All questions and concerns were addressed to the patient/wife, Chapincito Dodge. Alternatives to  treatment were discussed. The patient  currently  is smoking. The risks related to smoking were reviewed with the patient. Recommend maintaining a smoke-free lifestyle. Dual Antiplatelet therapy has been recommended / prescribed for this patient. Education conducted on adverse reactions including bleeding was discussed. The patient verbalizes understanding. Pt is no longer on a BB : labetalol stopped d/t bradycardia  Pt is on an ace-i/ARB  Pt is on a statin      Saturated fat diet discussed  Exercise program discussed : walks occassion    Thank you for allowing to us to participate in the care of [employer].       [address]  Documentation of today's visit sent to PCP

## 2022-08-29 ENCOUNTER — HOSPITAL ENCOUNTER (OUTPATIENT)
Dept: CARDIAC REHAB | Age: 82
Setting detail: THERAPIES SERIES
Discharge: HOME | DRG: 309 | End: 2022-08-29
Payer: MEDICARE

## 2022-08-29 VITALS
RESPIRATION RATE: 16 BRPM | OXYGEN SATURATION: 96 % | WEIGHT: 195 LBS | DIASTOLIC BLOOD PRESSURE: 80 MMHG | SYSTOLIC BLOOD PRESSURE: 124 MMHG | HEIGHT: 69 IN | BODY MASS INDEX: 28.88 KG/M2 | HEART RATE: 89 BPM

## 2022-08-29 PROCEDURE — 93798 PHYS/QHP OP CAR RHAB W/ECG: CPT

## 2022-08-29 ASSESSMENT — PATIENT HEALTH QUESTIONNAIRE - PHQ9
8. MOVING OR SPEAKING SO SLOWLY THAT OTHER PEOPLE COULD HAVE NOTICED. OR THE OPPOSITE, BEING SO FIGETY OR RESTLESS THAT YOU HAVE BEEN MOVING AROUND A LOT MORE THAN USUAL: 0
2. FEELING DOWN, DEPRESSED OR HOPELESS: 0
7. TROUBLE CONCENTRATING ON THINGS, SUCH AS READING THE NEWSPAPER OR WATCHING TELEVISION: 1
3. TROUBLE FALLING OR STAYING ASLEEP: 0
6. FEELING BAD ABOUT YOURSELF - OR THAT YOU ARE A FAILURE OR HAVE LET YOURSELF OR YOUR FAMILY DOWN: 0
SUM OF ALL RESPONSES TO PHQ QUESTIONS 1-9: 1
1. LITTLE INTEREST OR PLEASURE IN DOING THINGS: 0
SUM OF ALL RESPONSES TO PHQ QUESTIONS 1-9: 1
10. IF YOU CHECKED OFF ANY PROBLEMS, HOW DIFFICULT HAVE THESE PROBLEMS MADE IT FOR YOU TO DO YOUR WORK, TAKE CARE OF THINGS AT HOME, OR GET ALONG WITH OTHER PEOPLE: 0
5. POOR APPETITE OR OVEREATING: 0
4. FEELING TIRED OR HAVING LITTLE ENERGY: 0
9. THOUGHTS THAT YOU WOULD BE BETTER OFF DEAD, OR OF HURTING YOURSELF: 0
SUM OF ALL RESPONSES TO PHQ9 QUESTIONS 1 & 2: 0
SUM OF ALL RESPONSES TO PHQ QUESTIONS 1-9: 1
SUM OF ALL RESPONSES TO PHQ QUESTIONS 1-9: 1

## 2022-08-29 ASSESSMENT — EJECTION FRACTION: EF_VALUE: 60

## 2022-08-29 NOTE — CARDIO/PULMONARY
[facility] Cardiac Rehabilitation Initial Evaluation    Brenda Schroeder            [id number]    Share medical information:  Yes -wife bambi  [phone]    Cardiac History    PTCA Stent  Valve Replacement  Arrhythmia:  at fib during TAVR  22    Physical Assessment     General Appearance   Height:  5' 9\" (175.3 cm)  Weight:  195 lb (88.5 kg)   BMI:  28.9  Skin color:  tan. Cardiovascular Assessment  BP Sittin/80 R arm  Sittin/80 (L arm)  Standin/70 (104/70R 120/80 L)  Heart rate:   89 Apical   Heart sounds: Regular S1, S2    Respiratory Assessment  Resp rate: 16    SpO2:  96 %  Quality/Effort:   unlabored  Sleep Apnea:  No  CPAP  No  Oxygen  No         Edema:  Yes nonpitting lower legs. \"R swells d/t ankle issue-arthritis. \"      Orthopedic/Exercise Limitations:  Yes --R ankle \"5\" pain on 1-10 scale. \"Continues to do activities\"  Pain:   Do you have pain?:  yes - R ankle. See above. Fall Risk Assessment     History of falling with or without injury: No  Use of ambulatory aid: No  Difficulty walking/impaired gait: No  Numbness in feet: No  Vision changes: No  Dizziness: No  Shortness of breath: No  Current medications include but not limited to: ACE, ARB, Beta  Blocker, Anticoagulant     Outpatient fall risk intervention strategies: Fall risk education provided    Abuse / Neglect  Physical/behavioral signs of abuse/neglect   No    Do you feel safe at home   Yes    Advanced Directives  Patient has Advanced Directives:  Yes  Patient given Advanced Directive pack:  Declined    Vaccinations  Influenza (annual): Yes-fall   Pneumonia:  Yes--within last 5 yrs. Pfizer Covid vaccine-\"has had 4 thru UC. Doesn't know dates. Pt here for first session of Cardiac Rehab. His heart symptom was sob especially in beginning of walking. Reviewed and discussed insurance benefits, pt v/u. Reviewed Cardiac Rehab Routine and RPE scale, pt v/u. Developed indiviudual treatment plan and goals set with patient; pt in agreement with plan and no further questions at this time. Pt wants to decrease weight 5-10 lbs by end of program by increasing exercise time 30-60 min. Gradually. R ankle issue. Decrease amount of pop, replacing it with water. Currently drinking 2 8 oz. Glasses/day. Will gradually increase toward 6-8 8 oz./day. Has felt loss d/t [employer] closing, after members for 60+ yrs. Used to help at food pantry also. Puts trust in God. Next visit scheduled for  8/31/22 1545 for 1600 start time. Then will come at 1430 until slot opens up at 1300.                  Feliz Palacios RN  8/29/2022

## 2022-08-31 ENCOUNTER — HOSPITAL ENCOUNTER (INPATIENT)
Age: 82
LOS: 2 days | Discharge: HOME | DRG: 309 | End: 2022-09-02
Attending: EMERGENCY MEDICINE | Admitting: HOSPITALIST
Payer: MEDICARE

## 2022-08-31 ENCOUNTER — TELEPHONE (OUTPATIENT)
Dept: CARDIOLOGY CLINIC | Age: 82
End: 2022-08-31

## 2022-08-31 ENCOUNTER — APPOINTMENT (OUTPATIENT)
Dept: GENERAL RADIOLOGY | Age: 82
DRG: 309 | End: 2022-08-31
Payer: MEDICARE

## 2022-08-31 ENCOUNTER — HOSPITAL ENCOUNTER (OUTPATIENT)
Dept: CARDIAC REHAB | Age: 82
Setting detail: THERAPIES SERIES
Discharge: HOME | DRG: 309 | End: 2022-08-31
Payer: MEDICARE

## 2022-08-31 DIAGNOSIS — I48.0 PAROXYSMAL ATRIAL FIBRILLATION (HCC): Primary | ICD-10-CM

## 2022-08-31 DIAGNOSIS — Z95.2 S/P TAVR (TRANSCATHETER AORTIC VALVE REPLACEMENT): ICD-10-CM

## 2022-08-31 PROBLEM — I48.91 A-FIB (HCC): Status: ACTIVE | Noted: 2022-08-31

## 2022-08-31 LAB
ANION GAP SERPL CALCULATED.3IONS-SCNC: 9 MMOL/L (ref 3–16)
BASOPHILS ABSOLUTE: 0.1 K/UL (ref 0–0.2)
BASOPHILS RELATIVE PERCENT: 1.4 %
BUN BLDV-MCNC: 27 MG/DL (ref 7–20)
CALCIUM SERPL-MCNC: 10 MG/DL (ref 8.3–10.6)
CHLORIDE BLD-SCNC: 98 MMOL/L (ref 99–110)
CO2: 26 MMOL/L (ref 21–32)
CREAT SERPL-MCNC: 1.1 MG/DL (ref 0.8–1.3)
EOSINOPHILS ABSOLUTE: 0.4 K/UL (ref 0–0.6)
EOSINOPHILS RELATIVE PERCENT: 4.7 %
GFR AFRICAN AMERICAN: >60
GFR NON-AFRICAN AMERICAN: >60
GLUCOSE BLD-MCNC: 100 MG/DL (ref 70–99)
HCT VFR BLD CALC: 41.8 % (ref 40.5–52.5)
HEMOGLOBIN: 14.5 G/DL (ref 13.5–17.5)
INR BLD: 1.13 (ref 0.87–1.14)
LYMPHOCYTES ABSOLUTE: 2.2 K/UL (ref 1–5.1)
LYMPHOCYTES RELATIVE PERCENT: 23.8 %
MCH RBC QN AUTO: 30.3 PG (ref 26–34)
MCHC RBC AUTO-ENTMCNC: 34.6 G/DL (ref 31–36)
MCV RBC AUTO: 87.5 FL (ref 80–100)
MONOCYTES ABSOLUTE: 1.5 K/UL (ref 0–1.3)
MONOCYTES RELATIVE PERCENT: 16.7 %
NEUTROPHILS ABSOLUTE: 4.9 K/UL (ref 1.7–7.7)
NEUTROPHILS RELATIVE PERCENT: 53.4 %
PDW BLD-RTO: 13.1 % (ref 12.4–15.4)
PLATELET # BLD: 174 K/UL (ref 135–450)
PMV BLD AUTO: 8.6 FL (ref 5–10.5)
POTASSIUM SERPL-SCNC: 3.9 MMOL/L (ref 3.5–5.1)
PRO-BNP: 2127 PG/ML (ref 0–449)
PROTHROMBIN TIME: 14.5 SEC (ref 11.7–14.5)
RBC # BLD: 4.78 M/UL (ref 4.2–5.9)
SODIUM BLD-SCNC: 133 MMOL/L (ref 136–145)
TROPONIN: 0.04 NG/ML
WBC # BLD: 9.2 K/UL (ref 4–11)

## 2022-08-31 PROCEDURE — 71045 X-RAY EXAM CHEST 1 VIEW: CPT

## 2022-08-31 PROCEDURE — 83880 ASSAY OF NATRIURETIC PEPTIDE: CPT

## 2022-08-31 PROCEDURE — 99285 EMERGENCY DEPT VISIT HI MDM: CPT

## 2022-08-31 PROCEDURE — 85610 PROTHROMBIN TIME: CPT

## 2022-08-31 PROCEDURE — 93005 ELECTROCARDIOGRAM TRACING: CPT | Performed by: EMERGENCY MEDICINE

## 2022-08-31 PROCEDURE — 85025 COMPLETE CBC W/AUTO DIFF WBC: CPT

## 2022-08-31 PROCEDURE — 1200000000 HC SEMI PRIVATE

## 2022-08-31 PROCEDURE — 80048 BASIC METABOLIC PNL TOTAL CA: CPT

## 2022-08-31 PROCEDURE — 84484 ASSAY OF TROPONIN QUANT: CPT

## 2022-08-31 PROCEDURE — 6370000000 HC RX 637 (ALT 250 FOR IP): Performed by: HOSPITALIST

## 2022-08-31 PROCEDURE — 2500000003 HC RX 250 WO HCPCS: Performed by: EMERGENCY MEDICINE

## 2022-08-31 PROCEDURE — 2580000003 HC RX 258: Performed by: HOSPITALIST

## 2022-08-31 PROCEDURE — 6370000000 HC RX 637 (ALT 250 FOR IP): Performed by: EMERGENCY MEDICINE

## 2022-08-31 RX ORDER — SODIUM CHLORIDE 9 MG/ML
INJECTION, SOLUTION INTRAVENOUS PRN
Status: DISCONTINUED | OUTPATIENT
Start: 2022-08-31 | End: 2022-09-02 | Stop reason: HOSPADM

## 2022-08-31 RX ORDER — ACETAMINOPHEN 650 MG/1
650 SUPPOSITORY RECTAL EVERY 6 HOURS PRN
Status: DISCONTINUED | OUTPATIENT
Start: 2022-08-31 | End: 2022-09-02 | Stop reason: HOSPADM

## 2022-08-31 RX ORDER — DILTIAZEM HYDROCHLORIDE 5 MG/ML
10 INJECTION INTRAVENOUS ONCE
Status: COMPLETED | OUTPATIENT
Start: 2022-08-31 | End: 2022-08-31

## 2022-08-31 RX ORDER — POLYETHYLENE GLYCOL 3350 17 G/17G
17 POWDER, FOR SOLUTION ORAL DAILY PRN
Status: DISCONTINUED | OUTPATIENT
Start: 2022-08-31 | End: 2022-09-02 | Stop reason: HOSPADM

## 2022-08-31 RX ORDER — ATORVASTATIN CALCIUM 80 MG/1
80 TABLET, FILM COATED ORAL NIGHTLY
Status: DISCONTINUED | OUTPATIENT
Start: 2022-08-31 | End: 2022-09-02 | Stop reason: HOSPADM

## 2022-08-31 RX ORDER — ONDANSETRON 4 MG/1
4 TABLET, ORALLY DISINTEGRATING ORAL EVERY 8 HOURS PRN
Status: DISCONTINUED | OUTPATIENT
Start: 2022-08-31 | End: 2022-09-02 | Stop reason: HOSPADM

## 2022-08-31 RX ORDER — ENALAPRIL MALEATE 10 MG/1
20 TABLET ORAL DAILY
Status: DISCONTINUED | OUTPATIENT
Start: 2022-09-01 | End: 2022-09-02

## 2022-08-31 RX ORDER — LATANOPROST 50 UG/ML
1 SOLUTION/ DROPS OPHTHALMIC NIGHTLY
Status: DISCONTINUED | OUTPATIENT
Start: 2022-08-31 | End: 2022-09-02 | Stop reason: HOSPADM

## 2022-08-31 RX ORDER — DILTIAZEM HCL/D5W 125 MG/125
2.5-15 PLASTIC BAG, INJECTION (ML) INTRAVENOUS CONTINUOUS
Status: DISCONTINUED | OUTPATIENT
Start: 2022-08-31 | End: 2022-09-01 | Stop reason: CLARIF

## 2022-08-31 RX ORDER — ACETAMINOPHEN 325 MG/1
650 TABLET ORAL EVERY 6 HOURS PRN
Status: DISCONTINUED | OUTPATIENT
Start: 2022-08-31 | End: 2022-09-02 | Stop reason: HOSPADM

## 2022-08-31 RX ORDER — SODIUM CHLORIDE 0.9 % (FLUSH) 0.9 %
5-40 SYRINGE (ML) INJECTION EVERY 12 HOURS SCHEDULED
Status: DISCONTINUED | OUTPATIENT
Start: 2022-08-31 | End: 2022-09-02 | Stop reason: HOSPADM

## 2022-08-31 RX ORDER — ONDANSETRON 2 MG/ML
4 INJECTION INTRAMUSCULAR; INTRAVENOUS EVERY 6 HOURS PRN
Status: DISCONTINUED | OUTPATIENT
Start: 2022-08-31 | End: 2022-09-02 | Stop reason: HOSPADM

## 2022-08-31 RX ORDER — SODIUM CHLORIDE 0.9 % (FLUSH) 0.9 %
5-40 SYRINGE (ML) INJECTION PRN
Status: DISCONTINUED | OUTPATIENT
Start: 2022-08-31 | End: 2022-09-02 | Stop reason: HOSPADM

## 2022-08-31 RX ADMIN — Medication 5 MG/HR: at 17:59

## 2022-08-31 RX ADMIN — Medication 10 ML: at 23:47

## 2022-08-31 RX ADMIN — APIXABAN 5 MG: 5 TABLET, FILM COATED ORAL at 18:58

## 2022-08-31 RX ADMIN — ATORVASTATIN CALCIUM 80 MG: 80 TABLET, FILM COATED ORAL at 22:17

## 2022-08-31 RX ADMIN — LATANOPROST 1 DROP: 50 SOLUTION OPHTHALMIC at 22:17

## 2022-08-31 RX ADMIN — DILTIAZEM HYDROCHLORIDE 10 MG: 5 INJECTION, SOLUTION INTRAVENOUS at 17:43

## 2022-08-31 ASSESSMENT — PAIN SCALES - GENERAL
PAINLEVEL_OUTOF10: 0
PAINLEVEL_OUTOF10: 0

## 2022-08-31 ASSESSMENT — PAIN - FUNCTIONAL ASSESSMENT: PAIN_FUNCTIONAL_ASSESSMENT: NONE - DENIES PAIN

## 2022-08-31 NOTE — ED PROVIDER NOTES
Emergency Department Encounter    Patient: Barbara Ferrer  MRN: [id number]  : 1940  Date of Evaluation: 2022  ED Provider:  Silvester Cowden, MD    Triage Chief Complaint:   Atrial Fibrillation (Pt brought over from cardiac rehab with onset of afib with RVR . No c/o's, was just getting ready to start his appointment today)    [place]:  Barbara Ferrer is a [de-identified] y.o. male that presents for evaluation of atrial fibrillation with rapid ventricular rate. He has a history of aortic stenosis and status post TAVR placement 3 weeks prior. He is on antiplatelet agents without anticoagulants. In 2022, LAD lesion of 90%, mild to moderate disease of the rest of the vessels, preserved ejection fraction. No syncope no chest pain. During his TAVR procedure he did have some transient A. fib which responded to DCCV. No dyspnea, positive except accelerated heart rate, and elevated blood pressure. He has been taken off of labetalol due to hypotension and bradycardia. Review was performed in full.   He was referred to the ER for evaluation of A. fib with RVR    ROS - see HPI, below listed is current ROS at time of my eval:  General:  No fevers, no weakness  Eyes:  no discharge  ENT:  No sore throat, no nasal congestion  Cardiovascular:  no chest pain, + palpitations  Respiratory:  No shortness of breath, no cough, no wheezing  Gastrointestinal:  No pain, no nausea, no vomiting, no diarrhea  Musculoskeletal:  No muscle pain, no joint pain  Skin:  No rash, no pruritis  Neurologic:  No speech problems, no headache, no extremity numbness, no extremity tingling, no extremity weakness  Psychiatric:  No anxiety  Genitourinary:  No dysuria, no hematuria  Endocrine:  No unexpected weight gain, no unexpected weight loss  Extremities:  no edema, no pain    Past Medical History:   Diagnosis Date    Aortic regurgitation 2009    Aortic stenosis     Arthritis     Pt says in his right ankle    Asthma     CAD (coronary artery disease)     Environmental allergies     Heart murmur     Hypercholesterolemia     Hypertension     Impaired fasting glucose     Prostate cancer ([address] 01/01/2006    Samuel     Past Surgical History:   Procedure Laterality Date    AORTIC VALVE REPLACEMENT N/A 08/02/2022    TRANSCATHETER AORTIC VALVE REPLACEMENT FEMORAL APPROACH performed by Marianna Patel MD at [address] [place] [address] N/A 08/02/2022    TRANSCATHETER AORTIC VALVE REPLACEMENT FEMORAL APPROACH performed by Humera Negro MD at [address] [place] [address]  01/2017    Repeat 5 years    CORONARY ANGIOPLASTY WITH STENT PLACEMENT  07/01/2022    HAND SURGERY Right 2012    OTHER SURGICAL HISTORY  2008    RADIOACTIVE SEED IMPLANTS FOR PROSTATE CANCER    PROSTATE SURGERY  2007    seed implant [facility])    SKIN BIOPSY      TONSILLECTOMY       Family History   Problem Relation Age of Onset    Cancer Mother         breast    Heart Disease Father 77        MI    Diabetes Paternal Grandmother      Social History     Socioeconomic History    Marital status:      Spouse name: Chel Feliciano    Number of children: 2    Years of education: 12    Highest education level: Not on file   Occupational History    Occupation: printing     Comment: Worked for [employer], [employer]   Tobacco Use    Smoking status: Never    Smokeless tobacco: Never    Tobacco comments:     counselled to never start   Vaping Use    Vaping Use: Never used   Substance and Sexual Activity    Alcohol use: No     Comment: RARE    Drug use: No    Sexual activity: Yes     Partners: Female   Other Topics Concern    Not on file   Social History Narrative    Not on file     Social Determinants of Health     Financial Resource Strain: Not on file   Food Insecurity: Not on file   Transportation Needs: Not on file   Physical Activity: Not on file   Stress: Not on file   Social Connections: Not on file   Intimate Partner Violence: Not on file   Housing Stability: Not on file     Current Facility-Administered Medications   Medication Dose Route Frequency Provider Last Rate Last Admin    dilTIAZem HCl in sodium chloride 125 mg / 125 mL infusion  2.5-15 mg/hr IntraVENous Continuous Milind Ross MD 7.5 mL/hr at 09/01/22 0337 7.5 mg/hr at 09/01/22 0337    apixaban (ELIQUIS) tablet 5 mg  5 mg Oral BID Milind Ross MD   5 mg at 08/31/22 1858    atorvastatin (LIPITOR) tablet 80 mg  80 mg Oral Nightly Milind Ross MD   80 mg at 08/31/22 2217    enalapril (VASOTEC) tablet 20 mg  20 mg Oral Daily Milind Ross MD        latanoprost (XALATAN) 0.005 % ophthalmic solution 1 drop  1 drop Both Eyes Nightly Milind Ross MD   1 drop at 08/31/22 2217    sodium chloride flush 0.9 % injection 5-40 mL  5-40 mL IntraVENous 2 times per day Carmen Hernández MD   10 mL at 08/31/22 2347    sodium chloride flush 0.9 % injection 5-40 mL  5-40 mL IntraVENous PRN Carmen Hernández MD        0.9 % sodium chloride infusion   IntraVENous PRN Milind Ross MD        ondansetron (ZOFRAN-ODT) disintegrating tablet 4 mg  4 mg Oral Q8H PRN Milind Ross MD        Or    ondansetron (ZOFRAN) injection 4 mg  4 mg IntraVENous Q6H PRN Milind Ross MD        polyethylene glycol (GLYCOLAX) packet 17 g  17 g Oral Daily PRN Carmen Hernández MD        acetaminophen (TYLENOL) tablet 650 mg  650 mg Oral Q6H PRN Milind Ross MD        Or    acetaminophen (TYLENOL) suppository 650 mg  650 mg Rectal Q6H PRN Milind Ross MD         No Known Allergies    Nursing Notes Reviewed    Physical Exam:  Triage VS:    ED Triage Vitals [08/31/22 1634]   Enc Vitals Group      BP (!) 141/89      Heart Rate (!) 125      Resp 15      Temp 97.4 °F (36.3 °C)      Temp Source Infrared      SpO2 99 %      Weight 195 lb (88.5 kg)      Height 5' 9\" (1.753 m)      Head Circumference       Peak Flow       Pain Score       Pain Loc       Pain Edu? Excl. in [address]? My pulse ox interpretation is - normal    General appearance:  No acute distress. Skin:  Warm. Dry. Eye:  Extraocular movements intact. Ears, nose, mouth and throat:  Oral mucosa moist   Neck:  Trachea midline. Extremity:  No swelling. Normal ROM     Heart:  irregular   Perfusion:  intact  Respiratory:  Lungs clear to auscultation bilaterally. Respirations nonlabored. Abdominal:  Normal bowel sounds. Soft. Nontender. Non distended. Neurological:  Alert and oriented times 3.              Psychiatric:  Appropriate    I have reviewed and interpreted all of the currently available lab results from this visit (if applicable):  Results for orders placed or performed during the hospital encounter of 08/31/22   Troponin   Result Value Ref Range    Troponin 0.04 (H) <0.01 ng/mL   Basic Metabolic Panel   Result Value Ref Range    Sodium 133 (L) 136 - 145 mmol/L    Potassium 3.9 3.5 - 5.1 mmol/L    Chloride 98 (L) 99 - 110 mmol/L    CO2 26 21 - 32 mmol/L    Anion Gap 9 3 - 16    Glucose 100 (H) 70 - 99 mg/dL    BUN 27 (H) 7 - 20 mg/dL    Creatinine 1.1 0.8 - 1.3 mg/dL    GFR Non-African American >60 >60    GFR African American >60 >60    Calcium 10.0 8.3 - 10.6 mg/dL   CBC with Auto Differential   Result Value Ref Range    WBC 9.2 4.0 - 11.0 K/uL    RBC 4.78 4.20 - 5.90 M/uL    Hemoglobin 14.5 13.5 - 17.5 g/dL    Hematocrit 41.8 40.5 - 52.5 %    MCV 87.5 80.0 - 100.0 fL    MCH 30.3 26.0 - 34.0 pg    MCHC 34.6 31.0 - 36.0 g/dL    RDW 13.1 12.4 - 15.4 %    Platelets 793 764 - 172 K/uL    MPV 8.6 5.0 - 10.5 fL    Neutrophils % 53.4 %    Lymphocytes % 23.8 %    Monocytes % 16.7 %    Eosinophils % 4.7 %    Basophils % 1.4 %    Neutrophils Absolute 4.9 1.7 - 7.7 K/uL    Lymphocytes Absolute 2.2 1.0 - 5.1 K/uL    Monocytes Absolute 1.5 (H) 0.0 - 1.3 K/uL    Eosinophils Absolute 0.4 0.0 - 0.6 K/uL    Basophils Absolute 0.1 0.0 - 0.2 K/uL   Protime-INR   Result Value Ref Range    Protime 14.5 11.7 - 14.5 sec    INR 1.13 0.87 - 1.14   Brain Natriuretic Peptide   Result Value Ref Range    Pro-BNP 2,127 (H) 0 - 449 pg/mL   EKG 12 Lead   Result Value Ref Range    Ventricular Rate 102 BPM    Atrial Rate 117 BPM    QRS Duration 94 ms    Q-T Interval 328 ms    QTc Calculation (Bazett) 427 ms    R Axis -20 degrees    T Axis 32 degrees    Diagnosis       Atrial fibrillation with rapid ventricular response with premature ventricular or aberrantly conducted complexesMinimal voltage criteria for LVH, may be normal variantAnterolateral infarct , age undeterminedAbnormal ECG      Radiographs (if obtained):  Radiologist's Report Reviewed:  No results found. EKG (if obtained): (All EKG's are interpreted by myself in the absence of a cardiologist)  Atrial fibrillation with rapid ventricular rate, occasional PVCs normal axis nonspecific ST segment change    MDM:  Presents the ER for evaluation of A. fib with RVR    1: A. fib with RVR. Diltiazem bolus and diltiazem infusion. Eliquis oral, possible echocardiogram and synchronized cardioversion if no chemical conversion  #2: LAD stent. Continue Plavix discontinue aspirin  Clinical Impression:  1. Paroxysmal atrial fibrillation (HCC)    2. S/P TAVR (transcatheter aortic valve replacement)      Disposition referral (if applicable):  No follow-up provider specified. Disposition medications (if applicable):  Current Discharge Medication List          Comment: Please note this report has been produced using speech recognition software and may contain errors related to that system including errors in grammar, punctuation, and spelling, as well as words and phrases that may be inappropriate. Efforts were made to edit the dictations.        Shari Arrieta MD  61/83/35 [id number]

## 2022-08-31 NOTE — H&P
HOSPITALISTS HISTORY AND PHYSICAL    8/31/2022 5:34 PM    Patient Information:  Stephanie Rondon is a [de-identified] y.o. male [id number]  PCP:  Frank Velasquez MD (Tel: [phone] )    Chief complaint:    Chief Complaint   Patient presents with    Atrial Fibrillation     Pt brought over from cardiac rehab with onset of afib with RVR . No c/o's, was just getting ready to start his appointment today        History of Present Illness:  Reji Hughes is a [de-identified] y.o. male who presented with complaints of A. fib with RVR new onset. Patient apparently was at cardiac rehab and started having some palpitation. Shortness of breath. Patient with recent history of TAVR. Discharge on August 5, 2022 s during during TAVR he had at he did have transient A. fib which responded well with cardioversion currently patient started having some elevated heart rates and was sent here. Patient has complex history of hypertension has been off of all the medication for a while. Denies any chest pain nothing that makes it better or worse ymptom on      REVIEW OF SYSTEMS:   Constitutional: Negative for fever,chills or night sweats  ENT: Negative for rhinorrhea, epistaxis, hoarseness, sore throat. Respiratory: Negative for shortness of breath,wheezing  Cardiovascular: Negative for chest pain, palpitations   Gastrointestinal: Negative for nausea, vomiting, diarrhea  Genitourinary: Negative for polyuria, dysuria   Hematologic/Lymphatic: Negative for bleeding tendency, easy bruising  Musculoskeletal: Negative for myalgias and arthralgias  Neurologic: Negative for confusion,dysarthria. Skin: Negative for itching,rash, good capillary refill. Psychiatric: Negative for depression,anxiety, agitation. Endocrine: Negative for polydipsia,polyuria,heat /cold intolerance.     Past Medical History:   has a past medical history of Aortic regurgitation, Aortic stenosis, Arthritis, Asthma, CAD (coronary artery disease), Environmental allergies, Heart murmur, Hypercholesterolemia, Hypertension, Impaired fasting glucose, and Prostate cancer ([facility] [address] Past Surgical History:   has a past surgical history that includes other surgical history (2008); Prostate surgery (2007); Colonoscopy (01/2017); Hand surgery (Right, 2012); Coronary angioplasty with stent (07/01/2022); Aortic valve replacement (N/A, 08/02/2022); Aortic valve replacement (N/A, 08/02/2022); skin biopsy; and Tonsillectomy. Medications:  No current facility-administered medications on file prior to encounter. Current Outpatient Medications on File Prior to Encounter   Medication Sig Dispense Refill    Multiple Vitamins-Minerals (PRESERVISION AREDS PO) Take 1 tablet by mouth daily      atorvastatin (LIPITOR) 80 MG tablet Take 1 tablet by mouth nightly 90 tablet 3    clopidogrel (PLAVIX) 75 MG tablet Take 1 tablet by mouth daily 90 tablet 3    aspirin 81 MG chewable tablet Take 1 tablet by mouth daily 30 tablet 0    hydroCHLOROthiazide (HYDRODIURIL) 25 MG tablet Take 1 tablet by mouth daily 30 tablet 0    [DISCONTINUED] labetalol (NORMODYNE) 200 MG tablet Take 1 tablet by mouth every 12 hours 60 tablet 0    enalapril (VASOTEC) 20 MG tablet One tablet twice daily for blood pressure 180 tablet 3    [DISCONTINUED] Omega-3 Fatty Acids (FISH OIL) 1200 MG CAPS Take 1 capsule by mouth daily. latanoprost (XALATAN) 0.005 % ophthalmic solution Place 1 drop into both eyes nightly. Cholecalciferol (VITAMIN D) 2000 UNITS CAPS capsule 1 capsule daily. With food      Niacin 1000 MG TBCR 1,000 mg daily. Allergies:  No Known Allergies     Social History:   reports that he has never smoked. He has never used smokeless tobacco. He reports that he does not drink alcohol and does not use drugs. Family History:  family history includes Cancer in his mother; Diabetes in his paternal grandmother; Heart Disease (age of onset: 77) in his father.  , Physical Exam:  /89   Pulse (!) 106   Temp 97.4 °F (36.3 °C) (Infrared)   Resp 15   Ht 5' 9\" (1.753 m)   Wt 195 lb (88.5 kg)   SpO2 92%   BMI 28.80 kg/m²     General appearance:  Appears comfortable. Well nourished  Eyes: Sclera clear, pupils equal  ENT: Moist mucus membranes, no thrush. Trachea midline. Cardiovascular: Regular rhythm, normal S1, S2. No murmur, gallop, rub. No edema in lower extremities  Respiratory: Clear to auscultation bilaterally, no wheeze, good inspiratory effort  Gastrointestinal: Abdomen soft, non-tender, not distended, normal bowel sounds  Musculoskeletal: No cyanosis in digits, neck supple  Neurology: Cranial nerves grossly intact. Alert and oriented in time, place and person. No speech or motor deficits  Psychiatry: Appropriate affect. Not agitated  Skin: Warm, dry, normal turgor, no rash    Labs:  CBC:   Lab Results   Component Value Date/Time    WBC 9.2 08/31/2022 05:02 PM    RBC 4.78 08/31/2022 05:02 PM    HGB 14.5 08/31/2022 05:02 PM    HCT 41.8 08/31/2022 05:02 PM    MCV 87.5 08/31/2022 05:02 PM    MCH 30.3 08/31/2022 05:02 PM    MCHC 34.6 08/31/2022 05:02 PM    RDW 13.1 08/31/2022 05:02 PM     08/31/2022 05:02 PM    MPV 8.6 08/31/2022 05:02 PM     BMP:    Lab Results   Component Value Date/Time     08/12/2022 11:24 AM    K 4.2 08/12/2022 11:24 AM    K 3.9 08/03/2022 04:35 AM    CL 99 08/12/2022 11:24 AM    CO2 28 08/12/2022 11:24 AM    BUN 22 08/12/2022 11:24 AM    CREATININE 0.9 08/12/2022 11:24 AM    CALCIUM 9.3 08/12/2022 11:24 AM    GFRAA >60 08/12/2022 11:24 AM    GFRAA >60 06/20/2012 09:58 AM    LABGLOM >60 08/12/2022 11:24 AM    LABGLOM 74.3 05/16/2011 09:30 AM    GLUCOSE 159 08/12/2022 11:24 AM    GLUCOSE 117 05/16/2011 09:30 AM       Chest Xray:   EKG:    I visualized CXR images and EKG strips     Discussed  with     Problem List  Principal Problem:    A-fib [facility])  Resolved Problems:    * No resolved hospital problems.  * Assessment/Plan:     BRIGITTE fib with RVR  -New finding  -Complex cardiac history with recent TAVR  -Patient will be started on Cardizem drip which will  -Eliquis.   Was started here  -Cardiology consult n.p.o. after midnight possible cardioversion    History of hypertension holding BP medication          Jenni Vazquez MD    8/31/2022 5:34 PM

## 2022-08-31 NOTE — TELEPHONE ENCOUNTER
Beba calling from Cardiac rehab stated that the patient is there for there first visit and his HR is 115-160 /80 patient is in A-fib and is having PVC. Patient is feeling fine. Patient is not on a blood thinner and patient has had a TAVR. Roc Nolan

## 2022-09-01 LAB
EKG ATRIAL RATE: 117 BPM
EKG DIAGNOSIS: NORMAL
EKG Q-T INTERVAL: 328 MS
EKG QRS DURATION: 94 MS
EKG QTC CALCULATION (BAZETT): 427 MS
EKG R AXIS: -20 DEGREES
EKG T AXIS: 32 DEGREES
EKG VENTRICULAR RATE: 102 BPM

## 2022-09-01 PROCEDURE — 2580000003 HC RX 258: Performed by: HOSPITALIST

## 2022-09-01 PROCEDURE — 94760 N-INVAS EAR/PLS OXIMETRY 1: CPT

## 2022-09-01 PROCEDURE — 99223 1ST HOSP IP/OBS HIGH 75: CPT | Performed by: INTERNAL MEDICINE

## 2022-09-01 PROCEDURE — 1200000000 HC SEMI PRIVATE

## 2022-09-01 PROCEDURE — 93010 ELECTROCARDIOGRAM REPORT: CPT | Performed by: INTERNAL MEDICINE

## 2022-09-01 PROCEDURE — 2500000003 HC RX 250 WO HCPCS: Performed by: HOSPITALIST

## 2022-09-01 PROCEDURE — 6370000000 HC RX 637 (ALT 250 FOR IP): Performed by: HOSPITALIST

## 2022-09-01 RX ORDER — DILTIAZEM HCL IN NACL,ISO-OSM 125 MG/125
2.5-15 PLASTIC BAG, INJECTION (ML) INTRAVENOUS CONTINUOUS
Status: DISCONTINUED | OUTPATIENT
Start: 2022-09-01 | End: 2022-09-02

## 2022-09-01 RX ADMIN — Medication 10 ML: at 20:07

## 2022-09-01 RX ADMIN — LATANOPROST 1 DROP: 50 SOLUTION OPHTHALMIC at 20:07

## 2022-09-01 RX ADMIN — ENALAPRIL MALEATE 20 MG: 10 TABLET ORAL at 07:59

## 2022-09-01 RX ADMIN — Medication 7.5 MG/HR: at 03:37

## 2022-09-01 RX ADMIN — ATORVASTATIN CALCIUM 80 MG: 80 TABLET, FILM COATED ORAL at 20:07

## 2022-09-01 RX ADMIN — APIXABAN 5 MG: 5 TABLET, FILM COATED ORAL at 18:24

## 2022-09-01 RX ADMIN — APIXABAN 5 MG: 5 TABLET, FILM COATED ORAL at 07:59

## 2022-09-01 ASSESSMENT — PAIN SCALES - GENERAL
PAINLEVEL_OUTOF10: 0

## 2022-09-01 NOTE — CARE COORDINATION
Discharge Planning Assessment  Readmission score 12%%    RN discharge planner met with patient/ (and family member) to discuss reason for admission, current living situation, and potential needs at the time of discharge    Demographics/Insurance verified Yes- Medicare    Current type of dwelling: ranch style house with one step to get in. Patient from ECF/SW confirmed with:  N/A    Living arrangements: Lives with spouse    Level of function/Support: Independent    PCP: Yasmani Clark    Last Visit to PCP: 4 weeks ago    DME:  None    Active with any community resources/agencies/skilled home care: Has had outpatient PT before    Medication compliance issues: DEnies issues    Financial issues that could impact healthcare: No        Tentative discharge plan: home     Discussed and provided facilities of choice if transition to a skilled nursing facility is required at the time of discharge- No      Discussed with patient and/or family that on the day of discharge home tentative time of discharge will be between 10 AM and noon.     Transportation at the time of discharge:  spouse will assist.

## 2022-09-01 NOTE — CONSULTS
recurrent and symptomatic episodes then ablation can be considered in future. Discussed with nursing staff. Active Hospital Problems    Diagnosis Date Noted    A-fib [facility]) [I48.91] 2022     Priority: Medium       Diagnostic studies:   proBNP   Troponin 0.04    Echo 8/3/2022     *Definity administered for endocardial border definition. *Left ventricle - normal size, mild concentric LVH, normal function with EF   of 60%   *Abnormal (paradoxical) septal motion is present. *Aortic valve - well seated TAVR valve (ES3U 29mm), PV of 2.4 m/s, MG 12   mmHg and an EOA of 3.5 cm^2. No paravalvular leak noted. No intravalvular   regurgitation. *Pulmonic valve - mild regurgitation       I independently reviewed the cardiac diagnostic studies, ECG and relevant imaging studies. Lab Results   Component Value Date    LVEF 60 2022     Lab Results   Component Value Date    TSH 2.66 2015       Physical Examination:  Vitals:    22 1232   BP:    Pulse: 83   Resp: 18   Temp:    SpO2: 96%      In: 240 [P.O.:240]  Out: 1500    Wt Readings from Last 3 Encounters:   22 195 lb 4.8 oz (88.6 kg)   22 195 lb (88.5 kg)   22 198 lb 4.8 oz (89.9 kg)     Temp  Av.9 °F (36.6 °C)  Min: 97.4 °F (36.3 °C)  Max: 98.2 °F (36.8 °C)  Pulse  Av.7  Min: 78  Max: 125  BP  Min: 105/71  Max: 150/101  SpO2  Av.2 %  Min: 92 %  Max: 99 %    Intake/Output Summary (Last 24 hours) at 2022 1335  Last data filed at 2022 1701  Gross per 24 hour   Intake 240 ml   Output 1500 ml   Net -1260 ml         I independently reviewed all cardiac tracing from cardiac telemetry. Constitutional: Oriented. No distress. Head: Normocephalic and atraumatic. Mouth/Throat: Oropharynx is clear and moist.   Eyes: Conjunctivae normal. EOM are normal.   Neck: Neck supple. No JVD present. Cardiovascular: Normal rate, regular rhythm, S1&S2.   Faint systolic murmur  Pulmonary/Chest: Bilateral respiratory sounds. No rhonchi. Abdominal: Soft. No tenderness. Musculoskeletal: No tenderness. No edema    Lymphadenopathy: Has no cervical adenopathy. Neurological: Alert and oriented. Follows command, No Gross deficit   Skin: Skin is warm, No rash noted. Psychiatric: Has a normal behavior       Scheduled Meds:   apixaban  5 mg Oral BID    atorvastatin  80 mg Oral Nightly    [Held by provider] enalapril  20 mg Oral Daily    latanoprost  1 drop Both Eyes Nightly    sodium chloride flush  5-40 mL IntraVENous 2 times per day     Continuous Infusions:   dilTIAZem HCl in sodium chloride 5 mg/hr (09/01/22 1143)    sodium chloride       PRN Meds:.sodium chloride flush, sodium chloride, ondansetron **OR** ondansetron, polyethylene glycol, acetaminophen **OR** acetaminophen     Review of System:  [x] Full ROS obtained and negative except as mentioned in HPI    Prior to Admission medications    Medication Sig Start Date End Date Taking? Authorizing Provider   Multiple Vitamins-Minerals (PRESERVISION AREDS PO) Take 1 tablet by mouth daily  Patient not taking: Reported on 8/31/2022    Historical Provider, MD   atorvastatin (LIPITOR) 80 MG tablet Take 1 tablet by mouth nightly 7/8/22   Michael Quiroga MD   clopidogrel (PLAVIX) 75 MG tablet Take 1 tablet by mouth daily 7/8/22   Michael Quiroga MD   hydroCHLOROthiazide (HYDRODIURIL) 25 MG tablet Take 1 tablet by mouth daily 7/3/22   Rukhsana Ortega APRN - CNS   labetalol (NORMODYNE) 200 MG tablet Take 1 tablet by mouth every 12 hours 7/2/22 8/3/22  JESSICA Rodriguez - CNS   enalapril (VASOTEC) 20 MG tablet One tablet twice daily for blood pressure 3/31/22   Michael Quiroga MD   Omega-3 Fatty Acids (FISH OIL) 1200 MG CAPS Take 1 capsule by mouth daily. 8/3/22  Historical Provider, MD   latanoprost (XALATAN) 0.005 % ophthalmic solution Place 1 drop into both eyes nightly. 6/3/12   Vane Nick MD   Cholecalciferol (VITAMIN D) 2000 UNITS CAPS capsule 1 capsule daily.  With food Historical Provider, MD   Niacin 1000 MG TBCR 1,000 mg daily. Historical Provider, MD       Past Medical History:   Diagnosis Date    Aortic regurgitation 01/01/2009    Aortic stenosis     Arthritis     Pt says in his right ankle    Asthma     CAD (coronary artery disease)     Environmental allergies     Heart murmur     Hypercholesterolemia     Hypertension     Impaired fasting glucose     Prostate cancer ([address] 01/01/2006    Samuel        Past Surgical History:   Procedure Laterality Date    AORTIC VALVE REPLACEMENT N/A 08/02/2022    TRANSCATHETER AORTIC VALVE REPLACEMENT FEMORAL APPROACH performed by Yousif Coto MD at [address] [facility] 08/02/2022    TRANSCATHETER AORTIC VALVE REPLACEMENT FEMORAL APPROACH performed by Charity Negro MD at [address] [place] [address]  01/2017    Repeat 5 years    CORONARY ANGIOPLASTY WITH STENT PLACEMENT  07/01/2022    HAND SURGERY Right 2012    OTHER SURGICAL HISTORY  2008    RADIOACTIVE SEED IMPLANTS FOR PROSTATE CANCER    PROSTATE SURGERY  2007    seed implant [facility])    SKIN BIOPSY      TONSILLECTOMY         No Known Allergies    Social History:  Reviewed. reports that he has never smoked. He has never used smokeless tobacco. He reports that he does not drink alcohol and does not use drugs. Family History:  Reviewed. Reviewed. No family history of SCD. Relevant and available labs, and cardiovascular diagnostics reviewed. Reviewed. Recent Labs     08/31/22  1702   *   K 3.9   CL 98*   CO2 26   BUN 27*   CREATININE 1.1     Recent Labs     08/31/22  1702   WBC 9.2   HGB 14.5   HCT 41.8   MCV 87.5        Estimated Creatinine Clearance: 59 mL/min (based on SCr of 1.1 mg/dL). No results found for: BNP    I independently reviewed all cardiac tracing from cardiac telemetry.     I independently reviewed relevant and available cardiac diagnostic tests ECG, CXR, Echo, Stress test, Device interrogation, Holter, CT scan.   Complex medical condition with multiple medical problems affecting prognosis and outcome of EP interventions  Severe exacerbation of underlying medical condition requiring hospitalization and at risk of decompensation. All questions and concerns were addressed to the patient/family. Alternatives to my treatment were discussed. I have discussed the above stated plan and the patient verbalized understanding and agreed with the plan. NOTE: This report was transcribed using voice recognition software. Every effort was made to ensure accuracy, however, inadvertent computerized transcription errors may be present.      Estefany Welsh MD, MPH  [place] [address]   Office: [phone]  Fax: [phone]

## 2022-09-01 NOTE — PROGRESS NOTES
Pharmacy to check patient copays for Eliquis/Xarelto:    Copay for patient will be: $495/month for Xarelto, Eliquis is not covered. Patient has medicare so not eligible for copay cards. Pharmacy will continue to follow the decision for anticoagulation and  the patient if appropriate.      Luis Enrique Dorman, PharmD, BCPS  Clinical Pharmacist  [id number]

## 2022-09-01 NOTE — PLAN OF CARE
Problem: Discharge Planning  Goal: Discharge to home or other facility with appropriate resources  Outcome: Progressing     Problem: Pain  Goal: Verbalizes/displays adequate comfort level or baseline comfort level  Outcome: Progressing  Note: Pain assessed Q4 and PRN. PRN pain medication available. Pt denied any pain this AM.  Will continue to monitor. Problem: Safety - Adult  Goal: Free from fall injury  Outcome: Progressing  Note: Medium fall risk per Donahue scale. Fall precautions in place, bed alarm refused by patient at this time. Pt educated on importance of using call light. Non-skid footwear on patient, belongings within reach, bed in lowest position.

## 2022-09-01 NOTE — PROGRESS NOTES
HOSPITALISTS PROGRESS NOTE    9/1/2022 8:23 AM        Name: Dean Naranjo . Admitted: 8/31/2022  Primary Care Provider: Maddie Jurado MD (Tel: [phone])      Brief Course: This [de-identified] y.o. with PMHx of hypertension, asthma, CAD, AAA s/p TAVR presented with  A. fib with RVR. Patient apparently was at cardiac rehab and started having some palpitation and shortness of breath. Of note, patient recently underwent TAVR and was on August 5, 2022. Patient did have transient A. fib during that hospital stay and responded well with cardioversion. Patient has complex history of hypertension has been off of all the medication for a while. Interval history:   Pt seen and examined today   Overnight events noted and interval ancillary notes  Remains in A. fib heart rate around 104  denied any fevers, chills, chest pain, palpitations, cough or shortness of breath    Assessment & Plan:     A. fib with RVR; converted back to NSR  Diltiazem drip discontinued and switched to p.o. Cardizem  Continue Eliquis and Plavix. Hx hypertension: Very sensitive to BP meds per chart review   Currently on p.o. Cardizem.   Monitor BP closely    Hyperlipidemia: Continue statins     DVT PPX: Eliquis  Code:Full Code      Disposition: Once medical issues resolved    Current Medications  dilTIAZem HCl in sodium chloride 125 mg / 125 mL infusion, Continuous  apixaban (ELIQUIS) tablet 5 mg, BID  atorvastatin (LIPITOR) tablet 80 mg, Nightly  enalapril (VASOTEC) tablet 20 mg, Daily  latanoprost (XALATAN) 0.005 % ophthalmic solution 1 drop, Nightly  sodium chloride flush 0.9 % injection 5-40 mL, 2 times per day  sodium chloride flush 0.9 % injection 5-40 mL, PRN  0.9 % sodium chloride infusion, PRN  ondansetron (ZOFRAN-ODT) disintegrating tablet 4 mg, Q8H PRN   Or  ondansetron (ZOFRAN) injection 4 mg, Q6H PRN  polyethylene glycol (GLYCOLAX) packet 17 g, Daily PRN  acetaminophen (TYLENOL) tablet 650 mg, Q6H PRN   Or  acetaminophen (TYLENOL) suppository 650 mg, Q6H PRN        Objective:  BP (!) 136/94   Pulse (!) 104   Temp 97.9 °F (36.6 °C) (Oral)   Resp 18   Ht 5' 9\" (1.753 m)   Wt 195 lb 4.8 oz (88.6 kg)   SpO2 96%   BMI 28.84 kg/m²     Intake/Output Summary (Last 24 hours) at 9/1/2022 8244  Last data filed at 9/1/2022 6172  Gross per 24 hour   Intake 240 ml   Output 1500 ml   Net -1260 ml      Wt Readings from Last 3 Encounters:   09/01/22 195 lb 4.8 oz (88.6 kg)   08/29/22 195 lb (88.5 kg)   08/12/22 198 lb 4.8 oz (89.9 kg)       Physical Examination:   General appearance:  No apparent distress, appears stated age and cooperative. Eyes: Sclera clear. Pupils equal.  ENT: Moist oral mucosa. Trachea midline, no adenopathy. Cardiovascular: Regular rhythm, normal S1, S2. No murmur. No edema in lower extremities  Respiratory:Not using accessory muscles. Good inspiratory effort. Clear to auscultation bilaterally, no wheeze or crackles. GI: Abdomen soft, no tenderness, not distended, normal bowel sounds  Musculoskeletal: normal ROM, No cyanosis in digits  Neurology: CN 2-12 grossly intact. No speech or motor deficits  Psych: Normal affect. Alert and oriented in time, place and person  Skin: Warm, dry, normal turgor    Labs and Tests:  CBC:   Recent Labs     08/31/22  1702   WBC 9.2   HGB 14.5        BMP:    Recent Labs     08/31/22  1702   *   K 3.9   CL 98*   CO2 26   BUN 27*   CREATININE 1.1   GLUCOSE 100*     Hepatic: No results for input(s): AST, ALT, ALB, BILITOT, ALKPHOS in the last 72 hours. XR CHEST PORTABLE   Final Result   No acute cardiopulmonary findings             Problem List  Principal Problem:    A-fib [facility])  Resolved Problems:    * No resolved hospital problems.  Moses Fernández MD   9/1/2022 8:23 AM

## 2022-09-02 ENCOUNTER — TELEPHONE (OUTPATIENT)
Dept: CARDIOLOGY CLINIC | Age: 82
End: 2022-09-02

## 2022-09-02 ENCOUNTER — HOSPITAL ENCOUNTER (OUTPATIENT)
Dept: CARDIAC REHAB | Age: 82
Setting detail: THERAPIES SERIES
End: 2022-09-02
Payer: MEDICARE

## 2022-09-02 VITALS
TEMPERATURE: 98 F | HEIGHT: 69 IN | SYSTOLIC BLOOD PRESSURE: 118 MMHG | OXYGEN SATURATION: 97 % | WEIGHT: 195.3 LBS | HEART RATE: 85 BPM | BODY MASS INDEX: 28.93 KG/M2 | RESPIRATION RATE: 14 BRPM | DIASTOLIC BLOOD PRESSURE: 80 MMHG

## 2022-09-02 PROCEDURE — 99233 SBSQ HOSP IP/OBS HIGH 50: CPT | Performed by: NURSE PRACTITIONER

## 2022-09-02 PROCEDURE — 2580000003 HC RX 258: Performed by: HOSPITALIST

## 2022-09-02 PROCEDURE — 6370000000 HC RX 637 (ALT 250 FOR IP): Performed by: HOSPITALIST

## 2022-09-02 PROCEDURE — 6370000000 HC RX 637 (ALT 250 FOR IP): Performed by: NURSE PRACTITIONER

## 2022-09-02 RX ORDER — AMOXICILLIN 500 MG/1
2000 CAPSULE ORAL ONCE
Qty: 4 CAPSULE | Refills: 2 | Status: SHIPPED | OUTPATIENT
Start: 2022-09-02 | End: 2022-09-02

## 2022-09-02 RX ORDER — DILTIAZEM HYDROCHLORIDE 120 MG/1
120 CAPSULE, COATED, EXTENDED RELEASE ORAL DAILY
Status: DISCONTINUED | OUTPATIENT
Start: 2022-09-02 | End: 2022-09-02 | Stop reason: HOSPADM

## 2022-09-02 RX ORDER — DILTIAZEM HYDROCHLORIDE 120 MG/1
120 CAPSULE, COATED, EXTENDED RELEASE ORAL DAILY
Qty: 30 CAPSULE | Refills: 1 | Status: SHIPPED | OUTPATIENT
Start: 2022-09-02 | End: 2022-09-15 | Stop reason: SDUPTHER

## 2022-09-02 RX ADMIN — DILTIAZEM HYDROCHLORIDE 120 MG: 120 CAPSULE, COATED, EXTENDED RELEASE ORAL at 10:19

## 2022-09-02 RX ADMIN — APIXABAN 5 MG: 5 TABLET, FILM COATED ORAL at 07:55

## 2022-09-02 RX ADMIN — Medication 10 ML: at 07:55

## 2022-09-02 ASSESSMENT — PAIN SCALES - GENERAL
PAINLEVEL_OUTOF10: 0

## 2022-09-02 NOTE — TELEPHONE ENCOUNTER
Per DCE, patient needs to take amoxicillin 2000 mg (4 tablets) 1 hour prior to the procedure.  Prescription sent to [facility]

## 2022-09-02 NOTE — DISCHARGE INSTRUCTIONS
Follow up with your PCP within 7-10 days of discharge. Follow up with cardiology as instructed   Take all your medications as prescribed.

## 2022-09-02 NOTE — PROGRESS NOTES
CLINICAL PHARMACY NOTE: MEDS TO BEDS    Total # of Prescriptions Filled: 2   The following medications were delivered to the patient:  Diltiazem    Eliquis 5 mg    Additional Documentation:  Delivered to Patient=Signed  Ok to be delivered per Shawnee Sherwood CPhT

## 2022-09-02 NOTE — DISCHARGE SUMMARY
Hospital Medicine Discharge Summary    Patient ID: Rebecca Dean      Patient's PCP: Luis Alberto Nascimento MD    Admit Date: 8/31/2022     Discharge Date: 9/2/2022     Admitting Physician: Saintclair Fujisawa, MD     Discharge Physician: Blanca Short MD        Active Hospital Problems    Diagnosis     A-fib [facility]) [I48.91]      Priority: [place] Hospital Course: This [de-identified] y.o. with PMHx of hypertension, CAD, AAA s/p TAVR presented with  A. fib with RVR. A fib with RVR; converted back to NSR  Diltiazem drip discontinued and switched to p.o. Cardizem per cardiology recs  Patient was instructed to continue Eliquis and Plavix and follow with cardiology as instructed     Hx hypertension: HCTZ and Vasotec discontinued. Started on p.o. Cardizem    Hx of CAD status post PCI 7/2022    Severe aortic stenosis; status post TAVR on 8/2022    Hyperlipidemia: Continue statins      Physical Exam Performed:     /80   Pulse 85   Temp 98 °F (36.7 °C) (Oral)   Resp 14   Ht 5' 9\" (1.753 m)   Wt 195 lb 4.8 oz (88.6 kg)   SpO2 97%   BMI 28.84 kg/m²     General appearance:  No apparent distress, appears stated age and cooperative. Eyes: Sclera clear. Pupils equal.  ENT: Moist oral mucosa. Trachea midline, no adenopathy. Cardiovascular: Regular rhythm, normal S1, S2. No murmur. No edema in lower extremities  Respiratory:Not usingaccessory muscles. Good inspiratory effort. Clear to auscultation bilaterally  GI: Abdomen soft, no tenderness, not distended, normal bowel sounds  Musculoskeletal: Normal ROM, No cyanosis in digits. Neurology: CN 2-12 grossly intact. No speech or motor deficits  Psych: Normal affect. Alert and oriented in time, place and person  Skin: Warm, dry, normal turgor    Consults:     IP CONSULT TO CARDIOLOGY    Disposition: Home    Condition at Discharge: Stable     Discharge Instructions/Follow-up:   Follow up with your PCP within 7-10 days of discharge.   Follow up with cardiology as instructed Take all your medications as prescribed. Discharge Medications:     Discharge Medication List as of 9/2/2022 11:37 AM             Details   dilTIAZem (CARDIZEM CD) 120 MG extended release capsule Take 1 capsule by mouth daily, Disp-30 capsule, R-1Normal      apixaban (ELIQUIS) 5 MG TABS tablet Take 1 tablet by mouth 2 times daily, Disp-60 tablet, R-1Normal                Details   Multiple Vitamins-Minerals (PRESERVISION AREDS PO) Take 1 tablet by mouth dailyHistorical Med      atorvastatin (LIPITOR) 80 MG tablet Take 1 tablet by mouth nightly, Disp-90 tablet, R-3Normal      clopidogrel (PLAVIX) 75 MG tablet Take 1 tablet by mouth daily, Disp-90 tablet, R-3Normal      latanoprost (XALATAN) 0.005 % ophthalmic solution Place 1 drop into both eyes nightly. Historical Med      Cholecalciferol (VITAMIN D) 2000 UNITS CAPS capsule 1 capsule daily. With foodHistorical Med      Niacin 1000 MG TBCR 1,000 mg daily. Historical Med           The patient was seen and examined on day of discharge and this discharge summary is in conjunction with any daily progress note from day of discharge. Time Spent on discharge is 45 minutes  in the examination, evaluation, counseling and review of medications and discharge plan. Note that greater  than 30 minutes was spent in preparing discharge papers, discussing discharge with patient, medication review, etc.     Signed:    Juanita Calvo MD   9/2/2022      Thank you Michael Quiroga MD for the opportunity to be involved in this patient's care. If you have any questions or concerns please feel free to contact me at [phone].

## 2022-09-02 NOTE — TELEPHONE ENCOUNTER
Called pt and relayed message per DCE with verbal understanding and encouraged to call office with any other questions or concerns.

## 2022-09-02 NOTE — TELEPHONE ENCOUNTER
Pt wife called pt is scheduled for a dental cleaning on 9/8, does the pt need an anabiotic before the cleaning? Does pt need to hold their blood thinners?     If pt needs antibiotic call into pharmacy below   [facility] [id number] - [id number], [facility] [address] Mateus Fernando [phone]   [address] [facility] [address]   Phone:  [phone]  Fax:  [phone]

## 2022-09-02 NOTE — PROGRESS NOTES
Data- discharge order received, pt verbalized agreement to discharge, disposition to previous residence, no needs for HHC/DME. Action- discharge instructions prepared and given to patient, pt verbalized understanding. Medication information packet given r/t NEW and/or CHANGED prescriptions emphasizing name/purpose/side effects, pt verbalized understanding. Discharge instruction summary: Diet- regular, Activity- up as tolerated, Primary Care Physician as follows: Jaron Ramirez -[phone] f/u appointment within one week, prescription medications filled in outpatient pharmacy. 1. WEIGHT: Admit Weight: 195 lb (88.5 kg) (08/31/22 1634)        Today  Weight: 195 lb 4.8 oz (88.6 kg) (09/01/22 0335)       2. O2 SAT.: SpO2: 97 % (09/02/22 1110)    Response- Pt belongings gathered, IV removed. Disposition is home (no HHC/DME needs), transported with wife, taken to lobby via w/c w/ RN, no complications.

## 2022-09-02 NOTE — PROGRESS NOTES
[facility]   Electrophysiology Progress Note   Date: 9/2/2022  Admit Date: 8/31/2022     Reason for follow up: PAF    Chief Complaint:   Chief Complaint   Patient presents with    Atrial Fibrillation     Pt brought over from cardiac rehab with onset of afib with RVR . No c/o's, was just getting ready to start his appointment today     History of Present Illness: History obtained from patient and medical record. Kelsie Sharp is a [de-identified] y.o. male who has been sent from cardiac rehab after he was found to be tachycardic with atrial fibrillation. Patient reports no complaints and was not aware of atrial fibrillation. Complex past medical history significant for CAD s/p PCI to LAD in July 22, severe aortic stenosis s/p TAVR on 8/2/2022. Reportedly he developed atrial fibrillation prior to aortic valve deployment and had cardioversion x1 which restored sinus rhythm. Patient reports no prior history of atrial arrhythmia. Reports no palpitation chest pressure or shortness of breath. 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 On admission he has been started on IV diltiazem drip. Interval Hx: Today, he is being seen for follow up. No new complaints today. No major events overnight. Denies having chest pain, palpitations, shortness of breath, orthopnea/PND, cough, or dizziness. Patient seen and examined. Clinical notes reviewed. Telemetry reviewed.     Assessment and Plan:  PAF   - Converted to SR spontaneously without recurrence   - [id number] at least 4    ~ Continue Eliquis 5 mg bid with ASA   - Consider ablation in the future dependent on recurrence and burden   - Add diltiazem for rate control, concerns for being sensitive to BP medications, will stop hydrochlorothiazide and Vasotec and he will monitor BP at home  CAD   - S/p of PCI 7/2022  Severe AS   - S/p TAVR 8/2022  Hypertension   - Hold hydrochlorothiazide due to hyponatremia per hospitalist   - Hold Vasotec and add diltiazem for rate control    - OK to return to cardiac rehab next week  - OK for discharge from EP perpsective with 2 month follow up    All pertinent information and plan of care discussed with the EP physician. Multiple medical conditions with risk of decompensation. 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 Authorizing Provider   Multiple Vitamins-Minerals (PRESERVISION AREDS PO) Take 1 tablet by mouth daily  Patient not taking: Reported on 8/31/2022  Historical Provider, MD   atorvastatin (LIPITOR) 80 MG tablet Take 1 tablet by mouth nightly  Daphney Andrea MD   clopidogrel (PLAVIX) 75 MG tablet Take 1 tablet by mouth daily  Daphney Andrea MD   hydroCHLOROthiazide (HYDRODIURIL) 25 MG tablet Take 1 tablet by mouth daily  JESSICA Sanchez - CNS   labetalol (NORMODYNE) 200 MG tablet Take 1 tablet by mouth every 12 hours  JESSICA Sanchez - CNS   enalapril (VASOTEC) 20 MG tablet One tablet twice daily for blood pressure  Daphney Andrea MD   Omega-3 Fatty Acids (FISH OIL) 1200 MG CAPS Take 1 capsule by mouth daily. Historical Provider, MD   latanoprost (XALATAN) 0.005 % ophthalmic solution Place 1 drop into both eyes nightly. Liliana Weeks MD   Cholecalciferol (VITAMIN D) 2000 UNITS CAPS capsule 1 capsule daily.  With HPI  Cardiovascular: Reviewed in HPI  Gastrointestinal: Negative for abdominal pain, N/V/D, constipation, or black/tarry stools  Genito-Urinary: Negative for hematuria  Musculoskeletal: No focal weakness  Neurological/Psych: Negative for confusion or TIA-like symptoms. No anxiety, depression, or insomnia    Physical Examination:  Vitals:    09/02/22 0745   BP: 133/86   Pulse: 80   Resp: 17   Temp: 97.8 °F (36.6 °C)   SpO2: 98%      In: 480 [P.O.:480]  Out: 500    Wt Readings from Last 3 Encounters:   09/01/22 195 lb 4.8 oz (88.6 kg)   08/29/22 195 lb (88.5 kg)   08/12/22 198 lb 4.8 oz (89.9 kg)       Intake/Output Summary (Last 24 hours) at 9/2/2022 0946  Last data filed at 9/2/2022 0539  Gross per 24 hour   Intake 480 ml   Output --   Net 480 ml     Telemetry: Personally Reviewed Normal sinus rhythm  Constitutional: Cooperative and in no apparent distress, and appears well nourished  Skin: Warm and pink; no cyanosis, bruising, or clubbing  HEENT: Symmetric and normocephalic. Conjunctiva pink with clear sclera. Mucus membranes pink and moist.   Cardiovascular: regular and rhythm. S1 & S2, negative for murmurs. Peripheral pulses 2+, capillary refill < 3 seconds. negative elevation of JVP. Respiratory: Respirations symmetric and unlabored. Lungs clear to auscultation bilaterally, no wheezing, crackles, or rhonchi  Gastrointestinal: Abdomen soft and round. Bowel sounds normoactive in all quadrants. Musculoskeletal: No focal weakness. Neurologic/Psych: Awake and orientated to person, place and time. Calm affect, appropriate mood    Pertinent labs, diagnostic, device, and imaging results reviewed as a part of this visit    Labs:    BMP:   Recent Labs     08/31/22  1702   *   K 3.9   CL 98*   CO2 26   BUN 27*   CREATININE 1.1     Estimated Creatinine Clearance: 59 mL/min (based on SCr of 1.1 mg/dL).    CBC:   Recent Labs     08/31/22  1702   WBC 9.2   HGB 14.5   HCT 41.8   MCV 87.5        Thyroid:   Lab Results   Component Value Date/Time    TSH 2.66 2015 10:15 AM     Lipids:   Lab Results   Component Value Date/Time    CHOL 139 2022 08:08 AM    HDL 58 2022 08:08 AM    TRIG 69 2022 08:08 AM     LFTS:   Lab Results   Component Value Date/Time    ALT 44 2022 12:35 PM    AST 37 2022 12:35 PM    ALKPHOS 142 2022 12:35 PM    PROT 6.7 2022 12:35 PM    PROT 6.7 2012 09:58 AM    AGRATIO 2.4 2022 08:08 AM    BILITOT 1.0 2022 12:35 PM     Cardiac Enzymes:   Lab Results   Component Value Date/Time    TROPONINI 0.04 2022 05:02 PM    TROPONINI <0.01 2022 11:45 AM     Coags:   Lab Results   Component Value Date/Time    PROTIME 14.5 2022 05:02 PM    INR 1.13 2022 05:02 PM     EC2022: Atrial fibrillation with rapid ventricular response with premature ventricular complexesMinimal voltage criteria for LVH, may be normal variantAbnormal Atrial fibrillation with rapid ventricular response with premature ventricular complexesMinimal voltage criteria for LVH, may be normal variant    ECHO:    8/3/2022   Summary   *Definity administered for endocardial border definition. *Left ventricle - normal size, mild concentric LVH, normal function with EF   of 60%   *Abnormal (paradoxical) septal motion is present. *Aortic valve - well seated TAVR valve (ES3U 29mm), PV of 2.4 m/s, MG 12   mmHg and an EOA of 3.5 cm^2. No paravalvular leak noted. No intravalvular   regurgitation. *Pulmonic valve - mild regurgitation    All questions and concerns were addressed to the patient. Alternatives to my treatment were discussed. I have discussed the above stated plan with patient and the nurse. The patient verbalized understanding and agreed with the plan. Thank you for allowing to us to participate in the care of [employer].     Bob Rosa, APRN-CNP  [facility]   Office: [phone]

## 2022-09-06 ENCOUNTER — TELEPHONE (OUTPATIENT)
Dept: FAMILY MEDICINE CLINIC | Age: 82
End: 2022-09-06

## 2022-09-06 NOTE — TELEPHONE ENCOUNTER
[facility] Transitions Initial Follow Up Call    Outreach made within 2 business days of discharge: Yes    Patient: Varghese Thomas Patient : 1940   MRN: [id number]  Reason for Admission: There are no discharge diagnoses documented for the most recent discharge. Discharge Date: 22       Spoke with: PATIENT    Discharge department/facility: [facility]    TCM Interactive Patient Contact:  Was patient able to fill all prescriptions: Yes  Was patient instructed to bring all medications to the follow-up visit: Yes  Is patient taking all medications as directed in the discharge summary?  Yes  Does patient understand their discharge instructions: Yes  Does patient have questions or concerns that need addressed prior to 7-14 day follow up office visit: no    Scheduled appointment with PCP within 7-14 days    Follow Up  Future Appointments   Date Time Provider Felicity Manzo   2022  2:30 PM [facility] CARD PULM EXERCISE 1 [address]    2022  2:30 PM [facility] CARD PULM EXERCISE [address]    2022  2:30 PM [facility] CARD PULM EXERCISE 1 [address]    2022  1:00 PM ECHO ROOM 1 [place] [facility] ECHO [facility]   2022  2:30 PM [facility] CARD PULM EXERCISE [address]    9/15/2022  8:30 AM Gerhard Ferguson MD [facility]   2022  2:30 PM [facility] CARD PULM EXERCISE 1 [facility] CARDIAC [facility]   2022  2:30 PM [facility] CARD PULM EXERCISE 1 [facility] CARDIAC [facility]   2022  2:30 PM [facility] CARD PULM EXERCISE 1 [facility] CARDIAC [facility]   2022  2:30 PM [facility] CARD PULM EXERCISE 1 [facility] CARDIAC [facility]   2022  2:30 PM [facility] CARD PULM EXERCISE 1 [facility] CARDIAC [facility]   2022  2:30 PM [facility] CARD PULM EXERCISE 1 [facility] CARDIAC [facility]   2022  7:40 AM Monalisa Ferrara MD [facility]   2022  2:30 PM [facility] CARD PULM EXERCISE 1 [address]    10/3/2022  2:30 PM [facility] CARD PULM EXERCISE [address] [place] [address]   10/5/2022  2:30 PM [facility] CARD PULM EXERCISE 1 [facility] CARDIAC [facility]   10/7/2022  2:30 PM [facility] CARD PULM EXERCISE 1 [facility] CARDIAC [facility]   10/10/2022  2:30 PM [facility] CARD PULM EXERCISE 1 [facility] CARDIAC [facility]   10/12/2022  2:30 PM [facility] CARD PULM EXERCISE 1 [facility] CARDIAC [facility]   10/14/2022  2:30 PM [facility] CARD PULM EXERCISE 1 [facility] CARDIAC [place]   10/17/2022  2:30 PM [facility] CARD PULM EXERCISE 1 [facility] CARDIAC [facility]   10/19/2022  2:30 PM [facility] CARD PULM EXERCISE 1 [facility] CARDIAC [facility]   10/21/2022  2:30 PM [facility] CARD PULM EXERCISE 1 [facility] CARDIAC [facility]   10/24/2022  2:30 PM [facility] CARD PULM EXERCISE 1 [facility] CARDIAC [facility]   10/26/2022  2:30 PM [facility] CARD PULM EXERCISE 1 [facility] CARDIAC [facility]   10/28/2022  2:30 PM [facility] CARD PULM EXERCISE 1 [facility] CARDIAC [facility]   10/31/2022  2:30 PM [facility] CARD PULM EXERCISE 1 [facility] CARDIAC [place]   11/1/2022  9:15 AM Minor Habermann, APRN - CNP [facility]   11/2/2022  2:30 PM [facility] CARD PULM EXERCISE 1 [facility] CARDIAC [facility]   11/4/2022  2:30 PM [facility] CARD PULM EXERCISE 1 [facility] CARDIAC [facility]   11/7/2022  2:30 PM [facility] CARD PULM EXERCISE 1 [facility] CARDIAC [place] [facility]   11/9/2022  2:30 PM [facility] CARD PULM EXERCISE 1 [facility] CARDIAC [place]   11/11/2022  2:30 PM [facility] CARD PULM EXERCISE 1 [facility] CARDIAC [place]   11/14/2022  2:30 PM [facility] CARD PULM EXERCISE 1 [facility] CARDIAC [place]   11/16/2022  2:30 PM [facility] CARD PULM EXERCISE 1 [facility] CARDIAC [place] [facility]   11[facility]18/2022  [facility] CARDIAC [place] [facility]     SPOKE TO PT, HE HAS A FOLLOW UP APPT ON 9/30/2022 WITH DR ARREOLA AND DOESN'T NEED TO BE SEEN RIGHT NOW. PT IS FOLLOWING UP WITH THE CARDIOLOGIST AND CARDIAC REHAB HAS BEGUN. WE DISCUSSED THE COST OF ELIQUIS AND HE WILL DISCUSS WITH THE CARDIOLOGIST AT HIS APPT. I INFORMED HIM THAT WE DO ANTICOAGULATION MONITORING AT OUR OFFICE IF HE NEEDS IT DUE TO COST OF ELIQUIS.   HE WILL LET ME KNOW IF NEEDED.   THANKS [address] [employer]      Ronaldo Holland, [address] [facility] [place]

## 2022-09-06 NOTE — TELEPHONE ENCOUNTER
Patient was admitted into the hospital on 8/31/22 for A-fib was was told to have a follow up within a week. He is seeing his heart doctor on 9/15/22. Please give him a call back. DOES HE NEED A APPOINTMENT WITH DR. BAPTISTE?

## 2022-09-07 ENCOUNTER — TELEPHONE (OUTPATIENT)
Dept: CARDIAC REHAB | Age: 82
End: 2022-09-07

## 2022-09-07 ENCOUNTER — HOSPITAL ENCOUNTER (OUTPATIENT)
Dept: CARDIAC REHAB | Age: 82
Setting detail: THERAPIES SERIES
End: 2022-09-07
Payer: MEDICARE

## 2022-09-07 ENCOUNTER — TELEPHONE (OUTPATIENT)
Dept: CARDIOLOGY CLINIC | Age: 82
End: 2022-09-07

## 2022-09-07 NOTE — TELEPHONE ENCOUNTER
IN ER FROM CARDIAC REHAB WITH AT -160'S. DISCHARGED 9/2/22. TO SEE DR. Kemi Felix 9/15 AFTER ECHO DONE 9/14.

## 2022-09-07 NOTE — TELEPHONE ENCOUNTER
Lisa Robles is needing a release to resume Cardiac Phrase 2. Please fax to: [phone]. Pt appt with DCE on 09/15.   Please advise

## 2022-09-09 ENCOUNTER — APPOINTMENT (OUTPATIENT)
Dept: CARDIAC REHAB | Age: 82
End: 2022-09-09
Payer: MEDICARE

## 2022-09-12 ENCOUNTER — APPOINTMENT (OUTPATIENT)
Dept: CARDIAC REHAB | Age: 82
End: 2022-09-12
Payer: MEDICARE

## 2022-09-12 NOTE — PROGRESS NOTES
Physician Progress Note      PATIENT:               Michael Ordaz  CSN #:                  [id number]  :                       1940  ADMIT DATE:       2022 4:32 PM  Marc Miller DATE:        2022 12:45 PM  RESPONDING  PROVIDER #:        Oscar Paulino MD          QUERY TEXT:    Patient admitted with Afib maintained on Eliquis. If possible, please document   in progress notes and discharge summary if you are evaluating and/or treating   any of the following: The medical record reflects the following:  Risk Factors: Afib maintained on Eliquis, HTN, [de-identified]years old  Clinical Indicators: Patient admitted with Afib maintained on Eliquis. 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     Query created by: Seamus Key on 2022 8:58 AM      Electronically signed by:  Oscar Paulino MD 2022 3:41 PM

## 2022-09-14 ENCOUNTER — HOSPITAL ENCOUNTER (OUTPATIENT)
Dept: NON INVASIVE DIAGNOSTICS | Age: 82
Discharge: HOME | End: 2022-09-14
Payer: MEDICARE

## 2022-09-14 ENCOUNTER — APPOINTMENT (OUTPATIENT)
Dept: CARDIAC REHAB | Age: 82
End: 2022-09-14
Payer: MEDICARE

## 2022-09-14 DIAGNOSIS — Z95.2 S/P TAVR (TRANSCATHETER AORTIC VALVE REPLACEMENT): ICD-10-CM

## 2022-09-14 PROBLEM — I48.0 PAF (PAROXYSMAL ATRIAL FIBRILLATION) (HCC): Status: ACTIVE | Noted: 2022-08-31

## 2022-09-14 PROBLEM — E78.2 MIXED HYPERLIPIDEMIA: Status: ACTIVE | Noted: 2022-09-14

## 2022-09-14 LAB
LV EF: 60 %
LVEF MODALITY: NORMAL

## 2022-09-14 PROCEDURE — 93306 TTE W/DOPPLER COMPLETE: CPT

## 2022-09-14 NOTE — PATIENT INSTRUCTIONS
Echo looks great!  Continue your current meds    You could discuss Watchman procedure with EP doctors if you would want to go off of blood thinner

## 2022-09-14 NOTE — PROGRESS NOTES
[facility]  H+P  Consult  OP Visit  FU Visit   CC HX HPI   GEN  Doing well. No new concerns. AS TAVR Ø CP, SOB. AF  Parox. Follows with EP   HTN  Ambulatory BP in good range. Ø HA/dizziness. CHOL  Last lipid reviewed. On max dose/tolerated statin. MED  Compliant with CV meds. Ø reported SA. HISTORY/ALLERGY/ROS   MEDHx  has a past medical history of Aortic regurgitation, Aortic stenosis, Arthritis, Asthma, CAD (coronary artery disease), Environmental allergies, Heart murmur, Hypercholesterolemia, Hypertension, Impaired fasting glucose, and Prostate cancer ([facility] [address] SURGHx  has a past surgical history that includes other surgical history (2008); Prostate surgery (2007); Colonoscopy (01/2017); Hand surgery (Right, 2012); Coronary angioplasty with stent (07/01/2022); Aortic valve replacement (N/A, 08/02/2022); Aortic valve replacement (N/A, 08/02/2022); skin biopsy; and Tonsillectomy. SOCHx  reports that he has never smoked. He has never used smokeless tobacco. He reports that he does not drink alcohol and does not use drugs. FAMHx family history includes Cancer in his mother; Diabetes in his paternal grandmother; Heart Disease (age of onset: 77) in his father. ALLERG Patient has no known allergies.    ROS Full ROS obtained and negative except as mentioned in HPI   MEDICATIONS   Current Outpatient Medications   Medication Sig Dispense Refill    dilTIAZem (CARDIZEM CD) 120 MG extended release capsule Take 1 capsule by mouth daily 30 capsule 1    apixaban (ELIQUIS) 5 MG TABS tablet Take 1 tablet by mouth 2 times daily 60 tablet 1    Multiple Vitamins-Minerals (PRESERVISION AREDS PO) Take 1 tablet by mouth daily (Patient not taking: Reported on 8/31/2022)      atorvastatin (LIPITOR) 80 MG tablet Take 1 tablet by mouth nightly 90 tablet 3    clopidogrel (PLAVIX) 75 MG tablet Take 1 tablet by mouth daily 90 tablet 3    latanoprost (XALATAN) 0.005 % ophthalmic solution Place 1 drop into both eyes nightly. Cholecalciferol (VITAMIN D) 2000 UNITS CAPS capsule 1 capsule daily. With food      Niacin 1000 MG TBCR 1,000 mg daily. No current facility-administered medications for this visit. PHYSICAL EXAM   Vitals BP (!) 140/70 (Site: Left Upper Arm, Position: Sitting, Cuff Size: Medium Adult)   Pulse 90   Ht 5' 9\" (1.753 m)   Wt 197 lb 4.8 oz (89.5 kg)   SpO2 98%   BMI 29.14 kg/m²     Gen Alert, coop, no distress Heart  Rrr, no mrg   Head NC, AT, no abnorm Abd  Soft, NT, +BS, no mass, no OM   Eyes PER, conj/corn clear Ext  Ext nl, AT, no C/C/E   Nose Nares nl, no drain, NT Pulse 2+ and symmetric   Throat Lips, mucosa, tongue nl Skin Col/text/turg nl, no vis rash/les   Neck S/S, TM, NT, no bruit/JVD Psych Nl mood and affect   Lung CTA-B, unlabored, no DTP Lymph   No cervical or axillary LA   Ch wall NT, no deform Neuro  Nl gross M/S exam      CODING   SCI (05667) - AS, AF, HTN, CHOL  30-39 minutes preparing to see pt including review hx, tests, consults, perf exam, , educating pt, fam, caregiver, ordering meds/tests/procedures, referring and comm with pcps and other consultants, documenting info in EMR, interpreting results and communicating to fam and coordination of pt care. SCRIBE   Nurse - [address] [place] [address] Ganesh Gale, am scribing for and in the presence of Nancy Negro MD.   Ganesh Mosqueda 09/14/22 10:08 AM    Doctor - Provider Lino Campbell is working as a scribe for and in the presence of aisha Negro MD). Working as a scribeGanesh may have prepopulated components of this note with my historical  intellectual property under my direct supervision. Any additions to this intellectual property were performed in my presence and at my direction.   Furthermore, the content and accuracy of this note have been reviewed by me Nancy Negro MD).  9/15/2022 7:17 AM   ASSESSMENT AND PLAN     *AS    Date EF Detail   Sx     Sob with exertion   DATA   Most recent TTE, [facility] reviewed personally   NYHA   II   Hx 8/22  TAVR 29 mm Tovar Ultra   TTE 6/14 4/22 8/22 9/22 55%  60%  60%  60% Mod AI  Severe AS MG 42, Mild to mod AI  TAVR MG 12  TAVR MG 12   [facility] 7/22  PCI of LAD Anisha Jones)   Plan     Doing well post TAVR.   Continue plavix for 6 months   *AFIB  Status parox   Plan Eliquis   *HTN  Status Controlled   Plan Counseled on diet/salt/exercise/weight, continue meds at doses above   *CHOL  LDL 67, 3/22   Plan Counseled on diet/exercise/weight, continue HI/MT statin   *COMPLIANCE  Status Compliant   Plan Discussed compliance with meds/diet/salt/exercise; avoid tob/alc/drugs   *FOLLOWUP  6 months

## 2022-09-15 ENCOUNTER — OFFICE VISIT (OUTPATIENT)
Dept: CARDIOLOGY CLINIC | Age: 82
End: 2022-09-15
Payer: MEDICARE

## 2022-09-15 VITALS
HEART RATE: 90 BPM | HEIGHT: 69 IN | WEIGHT: 197.3 LBS | OXYGEN SATURATION: 98 % | SYSTOLIC BLOOD PRESSURE: 140 MMHG | DIASTOLIC BLOOD PRESSURE: 70 MMHG | BODY MASS INDEX: 29.22 KG/M2

## 2022-09-15 DIAGNOSIS — I10 ESSENTIAL HYPERTENSION: ICD-10-CM

## 2022-09-15 DIAGNOSIS — E78.2 MIXED HYPERLIPIDEMIA: ICD-10-CM

## 2022-09-15 DIAGNOSIS — I35.0 NONRHEUMATIC AORTIC VALVE STENOSIS: Primary | ICD-10-CM

## 2022-09-15 DIAGNOSIS — I48.0 PAF (PAROXYSMAL ATRIAL FIBRILLATION) (HCC): ICD-10-CM

## 2022-09-15 PROCEDURE — 1036F TOBACCO NON-USER: CPT | Performed by: INTERNAL MEDICINE

## 2022-09-15 PROCEDURE — G8427 DOCREV CUR MEDS BY ELIG CLIN: HCPCS | Performed by: INTERNAL MEDICINE

## 2022-09-15 PROCEDURE — 1123F ACP DISCUSS/DSCN MKR DOCD: CPT | Performed by: INTERNAL MEDICINE

## 2022-09-15 PROCEDURE — G8417 CALC BMI ABV UP PARAM F/U: HCPCS | Performed by: INTERNAL MEDICINE

## 2022-09-15 PROCEDURE — 1111F DSCHRG MED/CURRENT MED MERGE: CPT | Performed by: INTERNAL MEDICINE

## 2022-09-15 PROCEDURE — 99214 OFFICE O/P EST MOD 30 MIN: CPT | Performed by: INTERNAL MEDICINE

## 2022-09-15 RX ORDER — DILTIAZEM HYDROCHLORIDE 120 MG/1
120 CAPSULE, COATED, EXTENDED RELEASE ORAL DAILY
Qty: 90 CAPSULE | Refills: 3 | Status: SHIPPED | OUTPATIENT
Start: 2022-09-15 | End: 2022-10-18

## 2022-09-16 ENCOUNTER — HOSPITAL ENCOUNTER (OUTPATIENT)
Dept: CARDIAC REHAB | Age: 82
Setting detail: THERAPIES SERIES
Discharge: HOME | End: 2022-09-16
Payer: MEDICARE

## 2022-09-16 ENCOUNTER — TELEPHONE (OUTPATIENT)
Dept: CARDIOLOGY CLINIC | Age: 82
End: 2022-09-16

## 2022-09-16 DIAGNOSIS — Z95.2 S/P TAVR (TRANSCATHETER AORTIC VALVE REPLACEMENT): Primary | ICD-10-CM

## 2022-09-16 NOTE — TELEPHONE ENCOUNTER
Karely Queen calling to see if it would be ok for the patient to return to cardiac rehab due to being in the Hospital. If this is ok's please place letter in [facility] and contact to let her know so that she can call the patient today to add him back onto their schedule.

## 2022-09-16 NOTE — PROGRESS NOTES
Spoke with pt regarding receiving okay from cardiologist to return to cardiac rehab. Pt will return to rehab on Monday 9/19/22.

## 2022-09-16 NOTE — TELEPHONE ENCOUNTER
Order placed in epic. If they need a letter ok to make it.  Order should be enough for patient to go to rehab

## 2022-09-19 ENCOUNTER — HOSPITAL ENCOUNTER (OUTPATIENT)
Dept: CARDIAC REHAB | Age: 82
Setting detail: THERAPIES SERIES
Discharge: HOME | End: 2022-09-19
Payer: MEDICARE

## 2022-09-19 PROCEDURE — 93798 PHYS/QHP OP CAR RHAB W/ECG: CPT

## 2022-09-21 ENCOUNTER — HOSPITAL ENCOUNTER (OUTPATIENT)
Dept: CARDIAC REHAB | Age: 82
Setting detail: THERAPIES SERIES
Discharge: HOME | End: 2022-09-21
Payer: MEDICARE

## 2022-09-21 PROCEDURE — 93798 PHYS/QHP OP CAR RHAB W/ECG: CPT

## 2022-09-23 ENCOUNTER — HOSPITAL ENCOUNTER (OUTPATIENT)
Dept: CARDIAC REHAB | Age: 82
Setting detail: THERAPIES SERIES
Discharge: HOME | End: 2022-09-23
Payer: MEDICARE

## 2022-09-23 PROCEDURE — 93798 PHYS/QHP OP CAR RHAB W/ECG: CPT

## 2022-09-26 ENCOUNTER — HOSPITAL ENCOUNTER (OUTPATIENT)
Dept: CARDIAC REHAB | Age: 82
Setting detail: THERAPIES SERIES
Discharge: HOME | End: 2022-09-26
Payer: MEDICARE

## 2022-09-26 PROCEDURE — 93798 PHYS/QHP OP CAR RHAB W/ECG: CPT

## 2022-09-28 ENCOUNTER — TELEPHONE (OUTPATIENT)
Dept: CARDIOLOGY CLINIC | Age: 82
End: 2022-09-28

## 2022-09-28 ENCOUNTER — HOSPITAL ENCOUNTER (OUTPATIENT)
Dept: CARDIAC REHAB | Age: 82
Setting detail: THERAPIES SERIES
Discharge: HOME | End: 2022-09-28
Payer: MEDICARE

## 2022-09-28 NOTE — TELEPHONE ENCOUNTER
Call[place] and spoke to Yazmin [place] [facility] and [place] started [place] PA for the patient Eliquis 5 Mg. Medication was Approved!

## 2022-09-30 ENCOUNTER — OFFICE VISIT (OUTPATIENT)
Dept: FAMILY MEDICINE CLINIC | Age: 82
End: 2022-09-30
Payer: MEDICARE

## 2022-09-30 ENCOUNTER — HOSPITAL ENCOUNTER (OUTPATIENT)
Dept: CARDIAC REHAB | Age: 82
Setting detail: THERAPIES SERIES
Discharge: HOME | End: 2022-09-30
Payer: MEDICARE

## 2022-09-30 VITALS
DIASTOLIC BLOOD PRESSURE: 82 MMHG | OXYGEN SATURATION: 99 % | WEIGHT: 194 LBS | HEART RATE: 76 BPM | SYSTOLIC BLOOD PRESSURE: 128 MMHG | HEIGHT: 69 IN | BODY MASS INDEX: 28.73 KG/M2

## 2022-09-30 DIAGNOSIS — I25.10 CORONARY ARTERY DISEASE DUE TO CALCIFIED CORONARY LESION: ICD-10-CM

## 2022-09-30 DIAGNOSIS — I35.0 AORTIC STENOSIS, SEVERE: ICD-10-CM

## 2022-09-30 DIAGNOSIS — Z23 NEED FOR INFLUENZA VACCINATION: Primary | ICD-10-CM

## 2022-09-30 DIAGNOSIS — I10 ESSENTIAL HYPERTENSION: ICD-10-CM

## 2022-09-30 DIAGNOSIS — H91.93 BILATERAL HEARING LOSS, UNSPECIFIED HEARING LOSS TYPE: ICD-10-CM

## 2022-09-30 DIAGNOSIS — R73.01 IMPAIRED FASTING GLUCOSE: ICD-10-CM

## 2022-09-30 DIAGNOSIS — I25.84 CORONARY ARTERY DISEASE DUE TO CALCIFIED CORONARY LESION: ICD-10-CM

## 2022-09-30 DIAGNOSIS — R73.03 PREDIABETES: ICD-10-CM

## 2022-09-30 DIAGNOSIS — E78.2 MIXED HYPERLIPIDEMIA: ICD-10-CM

## 2022-09-30 DIAGNOSIS — I48.0 PAF (PAROXYSMAL ATRIAL FIBRILLATION) (HCC): ICD-10-CM

## 2022-09-30 PROCEDURE — 1036F TOBACCO NON-USER: CPT | Performed by: FAMILY MEDICINE

## 2022-09-30 PROCEDURE — 99214 OFFICE O/P EST MOD 30 MIN: CPT | Performed by: FAMILY MEDICINE

## 2022-09-30 PROCEDURE — 90694 VACC AIIV4 NO PRSRV 0.5ML IM: CPT | Performed by: FAMILY MEDICINE

## 2022-09-30 PROCEDURE — G8417 CALC BMI ABV UP PARAM F/U: HCPCS | Performed by: FAMILY MEDICINE

## 2022-09-30 PROCEDURE — 93798 PHYS/QHP OP CAR RHAB W/ECG: CPT

## 2022-09-30 PROCEDURE — 1123F ACP DISCUSS/DSCN MKR DOCD: CPT | Performed by: FAMILY MEDICINE

## 2022-09-30 PROCEDURE — 1111F DSCHRG MED/CURRENT MED MERGE: CPT | Performed by: FAMILY MEDICINE

## 2022-09-30 PROCEDURE — G0008 ADMIN INFLUENZA VIRUS VAC: HCPCS | Performed by: FAMILY MEDICINE

## 2022-09-30 PROCEDURE — G8428 CUR MEDS NOT DOCUMENT: HCPCS | Performed by: FAMILY MEDICINE

## 2022-09-30 NOTE — PROGRESS NOTES
murmur     Hypercholesterolemia     Hypertension     Impaired fasting glucose     Prostate cancer ([address] 01/01/2006    Samuel     Past Surgical History:   Procedure Laterality Date    AORTIC VALVE REPLACEMENT N/A 08/02/2022    TRANSCATHETER AORTIC VALVE REPLACEMENT FEMORAL APPROACH performed by Caleb Harper MD at [address] [place] [address] N/A 08/02/2022    TRANSCATHETER AORTIC VALVE REPLACEMENT FEMORAL APPROACH performed by Richard Jurado MD at [address] [place] [address] Dr  01/2017    Repeat 5 years    CORONARY ANGIOPLASTY WITH STENT PLACEMENT  07/01/2022    HAND SURGERY Right 2012    OTHER SURGICAL HISTORY  2008    RADIOACTIVE SEED IMPLANTS FOR PROSTATE CANCER    PROSTATE SURGERY  2007    seed implant [facility])    SKIN BIOPSY      TONSILLECTOMY       Admission on 08/31/2022, Discharged on 09/02/2022   Component Date Value Ref Range Status    Ventricular Rate 08/31/2022 102  BPM Final    Atrial Rate 08/31/2022 117  BPM Final    QRS Duration 08/31/2022 94  ms Final    Q-T Interval 08/31/2022 328  ms Final    QTc Calculation (Bazett) 08/31/2022 427  ms Final    R Axis 08/31/2022 -20  degrees Final    T Axis 08/31/2022 32  degrees Final    Diagnosis 08/31/2022 Atrial fibrillation with rapid ventricular response with premature ventricular complexesMinimal voltage criteria for LVH, may be normal variantAbnormal ECGConfirmed by Shari Hancock ([id number]) on 9/1/2022 5:47:38 PM   Final    Troponin 08/31/2022 0.04 (A)  <0.01 ng/mL Final    Methodology by Troponin T    Sodium 08/31/2022 133 (A)  136 - 145 mmol/L Final    Potassium 08/31/2022 3.9  3.5 - 5.1 mmol/L Final    Chloride 08/31/2022 98 (A)  99 - 110 mmol/L Final    CO2 08/31/2022 26  21 - 32 mmol/L Final    Anion Gap 08/31/2022 9  3 - 16 Final    Glucose 08/31/2022 100 (A)  70 - 99 mg/dL Final    BUN 08/31/2022 27 (A)  7 - 20 mg/dL Final    Creatinine 08/31/2022 1.1  0.8 - 1.3 mg/dL Final    GFR Non- 08/31/2022 >60  >60 Final Comment: >60 mL/min/1.73m2 EGFR, calc. for ages 25 and older using the  MDRD formula (not corrected for weight), is valid for stable  renal function. GFR  08/31/2022 >60  >60 Final    Comment: Chronic Kidney Disease: less than 60 ml/min/1.73 sq.m. Kidney Failure: less than 15 ml/min/1.73 sq.m. Results valid for patients 18 years and older. Calcium 08/31/2022 10.0  8.3 - 10.6 mg/dL Final    WBC 08/31/2022 9.2  4.0 - 11.0 K/uL Final    RBC 08/31/2022 4.78  4.20 - 5.90 M/uL Final    Hemoglobin 08/31/2022 14.5  13.5 - 17.5 g/dL Final    Hematocrit 08/31/2022 41.8  40.5 - 52.5 % Final    MCV 08/31/2022 87.5  80.0 - 100.0 fL Final    MCH 08/31/2022 30.3  26.0 - 34.0 pg Final    MCHC 08/31/2022 34.6  31.0 - 36.0 g/dL Final    RDW 08/31/2022 13.1  12.4 - 15.4 % Final    Platelets 54/33/2020 174  135 - 450 K/uL Final    MPV 08/31/2022 8.6  5.0 - 10.5 fL Final    Neutrophils % 08/31/2022 53.4  % Final    Lymphocytes % 08/31/2022 23.8  % Final    Monocytes % 08/31/2022 16.7  % Final    Eosinophils % 08/31/2022 4.7  % Final    Basophils % 08/31/2022 1.4  % Final    Neutrophils Absolute 08/31/2022 4.9  1.7 - 7.7 K/uL Final    Lymphocytes Absolute 08/31/2022 2.2  1.0 - 5.1 K/uL Final    Monocytes Absolute 08/31/2022 1.5 (A)  0.0 - 1.3 K/uL Final    Eosinophils Absolute 08/31/2022 0.4  0.0 - 0.6 K/uL Final    Basophils Absolute 08/31/2022 0.1  0.0 - 0.2 K/uL Final    Protime 08/31/2022 14.5  11.7 - 14.5 sec Final    Comment: Effective 5-25-22 09:00am EST  Please note reference ranges have  changed for PT and INR Testing. INR 08/31/2022 1.13  0.87 - 1.14 Final    Comment: Effective 5/25/22 at 09:00am EST    Normal: 0.87 - 1.14  Therapeutic: 2.0 - 3.0  Pros.  Valve: 2.5 - 3.5  AMI: 2.0 - 3.0      Pro-BNP 08/31/2022 2,127 (A)  0 - 449 pg/mL Final    Comment: Methodology by NT-proBNP    An age-independent cutoff point of 300 pg/ml has a 98%  negative predictive value excluding acute heart failure. Values exceeding the age-related cutoff values (450 pg/mL if  age<50, 900 if 50-75 and 1800 if >75) has 90% sensitivity and  84% specificity for diagnosing acute HF. In patients with  renal compromise (eGFR<60) values greater than 1200pg/ml have  a diagnostic sensitivity and specificity of 89% and 72% for  acute HF. Family History   Problem Relation Age of Onset    Cancer Mother         breast    Heart Disease Father 77        MI    Diabetes Paternal Grandmother      Current Outpatient Medications   Medication Sig Dispense Refill    dilTIAZem (CARDIZEM CD) 120 MG extended release capsule Take 1 capsule by mouth daily 90 capsule 3    apixaban (ELIQUIS) 5 MG TABS tablet Take 1 tablet by mouth 2 times daily 60 tablet 5    Multiple Vitamins-Minerals (PRESERVISION AREDS PO) Take 1 tablet by mouth daily      atorvastatin (LIPITOR) 80 MG tablet Take 1 tablet by mouth nightly 90 tablet 3    clopidogrel (PLAVIX) 75 MG tablet Take 1 tablet by mouth daily 90 tablet 3    latanoprost (XALATAN) 0.005 % ophthalmic solution Place 1 drop into both eyes nightly. Cholecalciferol (VITAMIN D) 2000 UNITS CAPS capsule 1 capsule daily. With food      Niacin 1000 MG TBCR 1,000 mg daily. No current facility-administered medications for this visit. No Known Allergies    Review of Systems    Objective:  /82 (Site: Left Upper Arm, Position: Sitting, Cuff Size: Medium Adult)   Pulse 76   Ht 5' 9\" (1.753 m)   Wt 194 lb (88 kg)   SpO2 99%   BMI 28.65 kg/m²     BP Readings from Last 3 Encounters:   09/30/22 128/82   09/15/22 (!) 140/70   09/02/22 118/80       Pulse Readings from Last 3 Encounters:   09/30/22 76   09/15/22 90   09/02/22 85       Wt Readings from Last 3 Encounters:   09/30/22 194 lb (88 kg)   09/15/22 197 lb 4.8 oz (89.5 kg)   09/01/22 195 lb 4.8 oz (88.6 kg)       Physical Exam  Constitutional:       General: He is not in acute distress. Appearance: He is well-developed.    Eyes:

## 2022-09-30 NOTE — PROGRESS NOTES
Immunizations Administered       Name Date Dose Route    Influenza, FLUAD, (age 72 y+), Adjuvanted, 0.5mL 9/30/2022 0.5 mL Intramuscular    Site: Deltoid- Left    Lot: [id number]    NDC: 72780-850-44

## 2022-10-03 ENCOUNTER — APPOINTMENT (OUTPATIENT)
Dept: CARDIAC REHAB | Age: 82
End: 2022-10-03
Payer: MEDICARE

## 2022-10-03 ENCOUNTER — HOSPITAL ENCOUNTER (OUTPATIENT)
Dept: CARDIAC REHAB | Age: 82
Setting detail: THERAPIES SERIES
Discharge: HOME | End: 2022-10-03
Payer: MEDICARE

## 2022-10-03 PROCEDURE — 93798 PHYS/QHP OP CAR RHAB W/ECG: CPT

## 2022-10-05 ENCOUNTER — APPOINTMENT (OUTPATIENT)
Dept: CARDIAC REHAB | Age: 82
End: 2022-10-05
Payer: MEDICARE

## 2022-10-05 ENCOUNTER — HOSPITAL ENCOUNTER (OUTPATIENT)
Dept: CARDIAC REHAB | Age: 82
Setting detail: THERAPIES SERIES
Discharge: HOME | End: 2022-10-05
Payer: MEDICARE

## 2022-10-05 PROCEDURE — 93798 PHYS/QHP OP CAR RHAB W/ECG: CPT

## 2022-10-07 ENCOUNTER — APPOINTMENT (OUTPATIENT)
Dept: CARDIAC REHAB | Age: 82
End: 2022-10-07
Payer: MEDICARE

## 2022-10-07 ENCOUNTER — HOSPITAL ENCOUNTER (OUTPATIENT)
Dept: CARDIAC REHAB | Age: 82
Setting detail: THERAPIES SERIES
Discharge: HOME | End: 2022-10-07
Payer: MEDICARE

## 2022-10-07 PROCEDURE — 93798 PHYS/QHP OP CAR RHAB W/ECG: CPT

## 2022-10-10 ENCOUNTER — HOSPITAL ENCOUNTER (OUTPATIENT)
Dept: CARDIAC REHAB | Age: 82
Setting detail: THERAPIES SERIES
Discharge: HOME | End: 2022-10-10
Payer: MEDICARE

## 2022-10-10 ENCOUNTER — APPOINTMENT (OUTPATIENT)
Dept: CARDIAC REHAB | Age: 82
End: 2022-10-10
Payer: MEDICARE

## 2022-10-10 PROCEDURE — 93798 PHYS/QHP OP CAR RHAB W/ECG: CPT

## 2022-10-12 ENCOUNTER — HOSPITAL ENCOUNTER (OUTPATIENT)
Dept: CARDIAC REHAB | Age: 82
Setting detail: THERAPIES SERIES
Discharge: HOME | End: 2022-10-12
Payer: MEDICARE

## 2022-10-12 ENCOUNTER — APPOINTMENT (OUTPATIENT)
Dept: CARDIAC REHAB | Age: 82
End: 2022-10-12
Payer: MEDICARE

## 2022-10-12 PROCEDURE — 93798 PHYS/QHP OP CAR RHAB W/ECG: CPT

## 2022-10-14 ENCOUNTER — APPOINTMENT (OUTPATIENT)
Dept: CARDIAC REHAB | Age: 82
End: 2022-10-14
Payer: MEDICARE

## 2022-10-14 ENCOUNTER — TELEPHONE (OUTPATIENT)
Dept: FAMILY MEDICINE CLINIC | Age: 82
End: 2022-10-14

## 2022-10-14 ENCOUNTER — HOSPITAL ENCOUNTER (OUTPATIENT)
Dept: CARDIAC REHAB | Age: 82
Setting detail: THERAPIES SERIES
Discharge: HOME | End: 2022-10-14
Payer: MEDICARE

## 2022-10-14 PROCEDURE — 93798 PHYS/QHP OP CAR RHAB W/ECG: CPT

## 2022-10-14 NOTE — TELEPHONE ENCOUNTER
Pt is suppose to have a lesion removed and we did not have the medication in stock at that time. Pt wants to know if we have the equipment in yet? Please call pt     We can wait until Tuesday when Dr Irwin Mccormack is back in the office.

## 2022-10-17 ENCOUNTER — APPOINTMENT (OUTPATIENT)
Dept: CARDIAC REHAB | Age: 82
End: 2022-10-17
Payer: MEDICARE

## 2022-10-17 ENCOUNTER — PATIENT MESSAGE (OUTPATIENT)
Dept: FAMILY MEDICINE CLINIC | Age: 82
End: 2022-10-17

## 2022-10-17 ENCOUNTER — HOSPITAL ENCOUNTER (OUTPATIENT)
Dept: CARDIAC REHAB | Age: 82
Setting detail: THERAPIES SERIES
Discharge: HOME | End: 2022-10-17
Payer: MEDICARE

## 2022-10-17 PROCEDURE — 93798 PHYS/QHP OP CAR RHAB W/ECG: CPT

## 2022-10-17 RX ORDER — LISINOPRIL AND HYDROCHLOROTHIAZIDE 20; 12.5 MG/1; MG/1
1 TABLET ORAL DAILY
Qty: 30 TABLET | Refills: 1 | Status: SHIPPED | OUTPATIENT
Start: 2022-10-17

## 2022-10-17 NOTE — TELEPHONE ENCOUNTER
From: Efraín Long  To: Dr. Bledsoe Mirza: 10/17/2022 2:56 PM EDT  Subject: Blood Pressure    At my last visit on Sept. 30 you increased my Diltiazem by one pill (total of 2 per day). This has not seemed to help, so what should I do next? Blood pressure readings have been high at home, this morning's was 150/92 and 160/99. At rehab today it ranged between 168/92 on arrival and 144/80 after exercise.     Noelle

## 2022-10-18 ENCOUNTER — PATIENT MESSAGE (OUTPATIENT)
Dept: FAMILY MEDICINE CLINIC | Age: 82
End: 2022-10-18

## 2022-10-18 RX ORDER — DILTIAZEM HYDROCHLORIDE 240 MG/1
240 CAPSULE, COATED, EXTENDED RELEASE ORAL DAILY
Qty: 90 CAPSULE | Refills: 1 | Status: SHIPPED | OUTPATIENT
Start: 2022-10-18 | End: 2023-01-16

## 2022-10-18 NOTE — TELEPHONE ENCOUNTER
From: Ibrahima Coates  To: Dr. Sim Alberts: 10/18/2022 10:54 AM EDT  Subject: Medication    My prescription for Diltiazem ER 120mg was [facility] a [facility] day supply [facility] but since the dose was doubled will not last until refill due. Would you send a prescription for supply at new dose, with refills.

## 2022-10-19 ENCOUNTER — APPOINTMENT (OUTPATIENT)
Dept: CARDIAC REHAB | Age: 82
End: 2022-10-19
Payer: MEDICARE

## 2022-10-19 ENCOUNTER — HOSPITAL ENCOUNTER (OUTPATIENT)
Dept: CARDIAC REHAB | Age: 82
Setting detail: THERAPIES SERIES
Discharge: HOME | End: 2022-10-19
Payer: MEDICARE

## 2022-10-19 PROCEDURE — 93798 PHYS/QHP OP CAR RHAB W/ECG: CPT

## 2022-10-20 NOTE — PROGRESS NOTES
[address]   Electrophysiology  JESSICA Power-CNP  Attending EP: Dr. Jaden Sanchez    Date: 11/3/2022  I had the privilege of visiting Karen Frederick in the office. Chief Complaint:   Chief Complaint   Patient presents with    Atrial Fibrillation    Hypertension    Hyperlipidemia    Coronary Artery Disease    Follow-Up from Hospital     History of Present Illness: History obtained from patient and medical record. Karen Frederick is [de-identified] y.o. male with a past medical history of DM, CAD s/p PCI (7/22), AS s/p TAVR (8/22), and PAF. -Interval history: Today, Karen Frederick is being seen for routine follow up. His wife is present for the visit. He is doing well. He feels great since his TAVR and stent. He is currently completing cardiac rehab. He has a few sessions left. Pt remains in sinus rhythm on EKG today. He denies any known recurrence of AF, although he states he was asymptomatic during his last episode. We discussed means of ambulatory HR monitoring. His eliquis is >$500 a month and he has questions about alternatives    Denies having chest pain, palpitations, shortness of breath, orthopnea/PND, cough, or dizziness at the time of this visit. With regard to medication therapy the patient has been compliant with prescribed regimen. They have tolerated therapy to date. Allergies:  No Known Allergies    Home Medications:  Prior to Visit Medications    Medication Sig Taking?  Authorizing Provider   dilTIAZem (CARDIZEM CD) 240 MG extended release capsule Take 1 capsule by mouth daily Yes Gerson Herrmann MD   lisinopril-hydroCHLOROthiazide (PRINZIDE;ZESTORETIC) 20-12.5 MG per tablet Take 1 tablet by mouth daily Yes Gerson Herrmann MD   apixaban (ELIQUIS) 5 MG TABS tablet Take 1 tablet by mouth 2 times daily Yes Natacha Wolf MD   Multiple Vitamins-Minerals (PRESERVISION AREDS PO) Take 1 tablet by mouth daily Yes Historical Provider, MD   atorvastatin (LIPITOR) 80 MG tablet Take 1 tablet by mouth nightly Yes Ange Burkett MD   clopidogrel (PLAVIX) 75 MG tablet Take 1 tablet by mouth daily Yes Ange Burkett MD   latanoprost (XALATAN) 0.005 % ophthalmic solution Place 1 drop into both eyes nightly. Yes Mariano Pickering MD   Cholecalciferol (VITAMIN D) 2000 UNITS CAPS capsule 1 capsule daily. With food Yes Historical Provider, MD   Niacin 1000 MG TBCR 1,000 mg daily. Yes Historical Provider, MD   labetalol (NORMODYNE) 200 MG tablet Take 1 tablet by mouth every 12 hours  JESSICA Sanchez   hydroCHLOROthiazide (HYDRODIURIL) 25 MG tablet Take 1 tablet by mouth daily  JESSICA Sanchez   enalapril (VASOTEC) 20 MG tablet One tablet twice daily for blood pressure  Ange Burkett MD   Omega-3 Fatty Acids (FISH OIL) 1200 MG CAPS Take 1 capsule by mouth daily. Historical Provider, MD      Past Medical History:  Past Medical History:   Diagnosis Date    Aortic regurgitation 01/01/2009    Aortic stenosis     Arthritis     Pt says in his right ankle    Asthma     CAD (coronary artery disease)     Environmental allergies     Heart murmur     Hypercholesterolemia     Hypertension     Impaired fasting glucose     Prostate cancer ([facility] [address] 01/01/2006    Samuel     Past Surgical History:    has a past surgical history that includes other surgical history (2008); Prostate surgery (2007); Colonoscopy (01/2017); Hand surgery (Right, 2012); Coronary angioplasty with stent (07/01/2022); Aortic valve replacement (N/A, 08/02/2022); Aortic valve replacement (N/A, 08/02/2022); skin biopsy; and Tonsillectomy. Social History:  Personally Reviewed. reports that he has never smoked. He has never used smokeless tobacco. He reports that he does not drink alcohol and does not use drugs. Family History:  Personally Reviewed. family history includes Cancer in his mother; Diabetes in his paternal grandmother; Heart Disease (age of onset: 77) in his father.      Review of Systems:  Constitutional: Negative for fever, night sweats, chills, weight changes, or weakness  Skin: Negative for rash, dry skin, pruritus, bruising, bleeding, blood clots, or changes in skin pigment  HEENT: Negative for vision changes, ringing in the ears, sore throat, dysphagia, or swollen lymph nodes  Respiratory: Reviewed in HPI  Cardiovascular: Reviewed in HPI  Gastrointestinal: Negative for abdominal pain, N/V/D, constipation, or black/tarry stools  Genito-Urinary: Negative for dysuria, incontinence, urgency, or hematuria  Musculoskeletal: Negative for joint swelling, muscle pain, or injuries  Neurological/Psych: Negative for confusion, seizures, dizziness, headaches, balance issues or TIA-like symptoms. No anxiety, depression, or insomnia    Physical Examination:  Vitals:    11/03/22 1306   BP: 128/72   Pulse: 94   SpO2: 98%      Wt Readings from Last 3 Encounters:   11/03/22 193 lb 6.4 oz (87.7 kg)   09/30/22 194 lb (88 kg)   09/15/22 197 lb 4.8 oz (89.5 kg)     Constitutional: Cooperative and in no apparent distress, and appears well nourished  Skin: Warm and pink; no pallor, cyanosis, bruising, or clubbing  HEENT: Symmetric and normocephalic. PERRL, EOM intact. Conjunctiva pink with clear sclera. Mucus membranes pink and moist. Teeth intact. Thyroid smooth without nodules or goiter  Respiratory: Respirations symmetric and unlabored. Lungs clear to auscultation bilaterally, no wheezing, rhonchi, or crackles  Cardiovascular:  Regular rate and rhythm. S1/S2 present without murmurs, rubs, or gallops. Peripheral pulses 2+, capillary refill < 3 seconds. No elevation of JVP. No peripheral edema  Gastrointestinal: Abdomen soft and round. Bowel sounds normoactive in all quadrants without tenderness or masses. Musculoskeletal: Bilateral upper and lower extremity strength 5/5 with full ROM. Neurological/Psych: Awake and orientated to person, place and time. Calm affect, appropriate mood.      Pertinent labs, diagnostic, device, and imaging results reviewed as a part of this visit    LABS    CBC:   Lab Results   Component Value Date    WBC 9.2 2022    HGB 14.5 2022    HCT 41.8 2022    MCV 87.5 2022     2022     BMP:   Lab Results   Component Value Date    CREATININE 1.1 2022    BUN 27 (H) 2022     (L) 2022    K 3.9 2022    CL 98 (L) 2022    CO2 26 2022     Estimated Creatinine Clearance: 58 mL/min (based on SCr of 1.1 mg/dL). Thyroid:   Lab Results   Component Value Date    TSH 2.66 2015     Lipid Panel:   Lab Results   Component Value Date/Time    CHOL 139 2022 08:08 AM    HDL 58 2022 08:08 AM    TRIG 69 2022 08:08 AM     LFTs:  Lab Results   Component Value Date    ALT 44 (H) 2022    AST 37 2022    ALKPHOS 142 (H) 2022    BILITOT 1.0 2022     Coags:   Lab Results   Component Value Date    PROTIME 14.5 2022    INR 1.13 2022       EC/3/2022 (Personally Interpreted)  - NSR with PAC. LBBB. Rate 86, , QTc 431    Echo:    *Left ventricle - normal size, mild concentric LVH, normal function with EF   of 60%   *Aortic valve - well seated TAVR valve (29mm ES3U), PV of 2.47 m/s, MPG of   12 mmHg, and EOA of 4.26 cm2. No paravalvular or intravalvular leaks seen. *Tricuspid valve - mild regurgitation with PASP of 31mmHg    GXT: None    Cath:   Artery Findings/Result  LM normal  LAD 90% mid  Cx 30% mid  RI N/A  RCA 30% distal  LVEDP 20  LVG NA     Intervention:         PCI of LAD with 3.5X23 Xience LUIS (PD with 3.5NC)     Post Cath Dx:       CAD as above    Assessment: 1. Paroxysmal Atrial Fibrillation  - Currently in NSR  - Continue cardizem 240 mg QD  - [id number] score:4; [id number] Vasc score and anticoagulation discussed. High risk for stroke and thromboembolism. Anticoagulation is recommended. Risk of bleeding was discussed.  ~ On Eliquis 5 mg BID.  Discussed alternatives to eliquis, including Xarelto and coumadin. He will check with his insurance to ensure he is not in the donut hole and to determine the long term cost, then let us know if he would like to switch anti-coagulants. Recommend he stay on [facility] given risks of silent AF    - Afib risk factors including age, HTN, obesity, inactivity and BLADIMIR were discussed with patient. Risk factor modification recommended      - At this point, he has had a single occurrence of AF. He would like to continue ambulatory monitoring. We discussed means of ambulatory pulse monitoring and s/s of AF. He will call if they were to occur so he may come into the office for an EKG to verify. Will consider holter at next visit    2. LBBB   - Intermittent on EKGs  - Monitor for worsening conduction. Discussed s/s to monitor for and to call if they occur so he can come in for an EKG    3. CAD  - S/p PCI to LAD (7/22)  - Stable  - No complaints of angina  - Continue Plavix, ACE, and statin    ~ No BB ? Consider adding in future    4. Aortic Stenosis   - S/p TAVR (8/22)   - Followed by Dr. Shonda Luna    5. HTN  - Controlled: Goal <140/80  - Continue current medications  - Encouraged to monitor and log BP readings at home, then bring log to next visit  - Discussed importance of low sodium diet, weight control and exercise    Plan:  No changes  Call office if any issues  Check with insurance on long term pricing of eliquis and possible alternatives    F/U: Follow-up with Dr. Shonda Luna as scheduled and EP in 8 months  -Call [facility] at [phone] with any questions    Diet & Exercise: The patient is counseled to follow a low salt diet to assure blood pressure remains controlled for cardiovascular risk factor modification  The patient is counseled to avoid excess caffeine, and energy drinks as this may exacerbated ectopy and arrhythmia  The patient is counseled to lose weight to control cardiovascular risk factors  Exercise program discussed:  To improve overall cardiovascular health, the patient is instructed to increase cardiovascular related activities with a goal of 150 min/week of moderate level activity or 10,000 steps per day. Encouraged to perform as much activity as tolerated    I have addressed the patient's cardiac risk factors and adjusted pharmacologic treatment as needed. In addition, I have reinforced the need for patient directed risk factor modification. I independently reviewed the ECG    All questions and concerns were addressed with the patient. Alternatives to treatment were discussed. Thank you for allowing to us to participate in the care of Renetta Kala    Time  30 minutes spent preparing to see patient including reviewing patient history/prior tests/prior consults, performing a medical exam, counseling and educating patient/family/caregiver, ordering medications/tests/procedures, referring and communicating with PCPs and other pertinent consultants, documenting information in the EMR, independently interpreting results and communicating to family and coordination of patient care.     JESSICA Vaughan-SANDRA  [facility]   Office: [phone]

## 2022-10-21 ENCOUNTER — HOSPITAL ENCOUNTER (OUTPATIENT)
Dept: CARDIAC REHAB | Age: 82
Setting detail: THERAPIES SERIES
Discharge: HOME | End: 2022-10-21
Payer: MEDICARE

## 2022-10-21 ENCOUNTER — APPOINTMENT (OUTPATIENT)
Dept: CARDIAC REHAB | Age: 82
End: 2022-10-21
Payer: MEDICARE

## 2022-10-21 PROCEDURE — 93798 PHYS/QHP OP CAR RHAB W/ECG: CPT

## 2022-10-24 ENCOUNTER — HOSPITAL ENCOUNTER (OUTPATIENT)
Dept: CARDIAC REHAB | Age: 82
Setting detail: THERAPIES SERIES
Discharge: HOME | End: 2022-10-24
Payer: MEDICARE

## 2022-10-24 ENCOUNTER — APPOINTMENT (OUTPATIENT)
Dept: CARDIAC REHAB | Age: 82
End: 2022-10-24
Payer: MEDICARE

## 2022-10-24 PROCEDURE — 93798 PHYS/QHP OP CAR RHAB W/ECG: CPT

## 2022-10-26 ENCOUNTER — HOSPITAL ENCOUNTER (OUTPATIENT)
Dept: CARDIAC REHAB | Age: 82
Setting detail: THERAPIES SERIES
Discharge: HOME | End: 2022-10-26
Payer: MEDICARE

## 2022-10-26 ENCOUNTER — APPOINTMENT (OUTPATIENT)
Dept: CARDIAC REHAB | Age: 82
End: 2022-10-26
Payer: MEDICARE

## 2022-10-26 PROCEDURE — 93798 PHYS/QHP OP CAR RHAB W/ECG: CPT

## 2022-10-28 ENCOUNTER — APPOINTMENT (OUTPATIENT)
Dept: CARDIAC REHAB | Age: 82
End: 2022-10-28
Payer: MEDICARE

## 2022-10-28 ENCOUNTER — HOSPITAL ENCOUNTER (OUTPATIENT)
Dept: CARDIAC REHAB | Age: 82
Setting detail: THERAPIES SERIES
Discharge: HOME | End: 2022-10-28
Payer: MEDICARE

## 2022-10-28 PROCEDURE — 93798 PHYS/QHP OP CAR RHAB W/ECG: CPT

## 2022-10-31 ENCOUNTER — APPOINTMENT (OUTPATIENT)
Dept: CARDIAC REHAB | Age: 82
End: 2022-10-31
Payer: MEDICARE

## 2022-10-31 ENCOUNTER — HOSPITAL ENCOUNTER (OUTPATIENT)
Dept: CARDIAC REHAB | Age: 82
Setting detail: THERAPIES SERIES
Discharge: HOME | End: 2022-10-31
Payer: MEDICARE

## 2022-10-31 PROCEDURE — 93798 PHYS/QHP OP CAR RHAB W/ECG: CPT

## 2022-11-02 ENCOUNTER — HOSPITAL ENCOUNTER (OUTPATIENT)
Dept: CARDIAC REHAB | Age: 82
Setting detail: THERAPIES SERIES
Discharge: HOME | End: 2022-11-02
Payer: MEDICARE

## 2022-11-02 PROCEDURE — 93798 PHYS/QHP OP CAR RHAB W/ECG: CPT

## 2022-11-03 ENCOUNTER — OFFICE VISIT (OUTPATIENT)
Dept: CARDIOLOGY CLINIC | Age: 82
End: 2022-11-03
Payer: MEDICARE

## 2022-11-03 VITALS
SYSTOLIC BLOOD PRESSURE: 128 MMHG | OXYGEN SATURATION: 98 % | HEART RATE: 84 BPM | BODY MASS INDEX: 29.31 KG/M2 | WEIGHT: 193.4 LBS | HEIGHT: 68 IN | DIASTOLIC BLOOD PRESSURE: 72 MMHG

## 2022-11-03 DIAGNOSIS — Z09 HOSPITAL DISCHARGE FOLLOW-UP: ICD-10-CM

## 2022-11-03 DIAGNOSIS — I48.0 PAF (PAROXYSMAL ATRIAL FIBRILLATION) (HCC): Primary | ICD-10-CM

## 2022-11-03 DIAGNOSIS — I35.0 NONRHEUMATIC AORTIC VALVE STENOSIS: ICD-10-CM

## 2022-11-03 DIAGNOSIS — I10 ESSENTIAL HYPERTENSION: ICD-10-CM

## 2022-11-03 DIAGNOSIS — I25.10 CORONARY ARTERY DISEASE DUE TO CALCIFIED CORONARY LESION: ICD-10-CM

## 2022-11-03 DIAGNOSIS — I35.0 AORTIC STENOSIS, SEVERE: ICD-10-CM

## 2022-11-03 DIAGNOSIS — E78.2 MIXED HYPERLIPIDEMIA: ICD-10-CM

## 2022-11-03 DIAGNOSIS — I25.84 CORONARY ARTERY DISEASE DUE TO CALCIFIED CORONARY LESION: ICD-10-CM

## 2022-11-03 PROCEDURE — 93000 ELECTROCARDIOGRAM COMPLETE: CPT | Performed by: NURSE PRACTITIONER

## 2022-11-03 PROCEDURE — 3074F SYST BP LT 130 MM HG: CPT | Performed by: NURSE PRACTITIONER

## 2022-11-03 PROCEDURE — 1123F ACP DISCUSS/DSCN MKR DOCD: CPT | Performed by: NURSE PRACTITIONER

## 2022-11-03 PROCEDURE — 3078F DIAST BP <80 MM HG: CPT | Performed by: NURSE PRACTITIONER

## 2022-11-03 PROCEDURE — G8427 DOCREV CUR MEDS BY ELIG CLIN: HCPCS | Performed by: NURSE PRACTITIONER

## 2022-11-03 PROCEDURE — G8417 CALC BMI ABV UP PARAM F/U: HCPCS | Performed by: NURSE PRACTITIONER

## 2022-11-03 PROCEDURE — 99214 OFFICE O/P EST MOD 30 MIN: CPT | Performed by: NURSE PRACTITIONER

## 2022-11-03 PROCEDURE — G8484 FLU IMMUNIZE NO ADMIN: HCPCS | Performed by: NURSE PRACTITIONER

## 2022-11-03 PROCEDURE — 1036F TOBACCO NON-USER: CPT | Performed by: NURSE PRACTITIONER

## 2022-11-03 PROCEDURE — 1111F DSCHRG MED/CURRENT MED MERGE: CPT | Performed by: NURSE PRACTITIONER

## 2022-11-04 ENCOUNTER — HOSPITAL ENCOUNTER (OUTPATIENT)
Dept: CARDIAC REHAB | Age: 82
Setting detail: THERAPIES SERIES
Discharge: HOME | End: 2022-11-04
Payer: MEDICARE

## 2022-11-04 PROCEDURE — 93798 PHYS/QHP OP CAR RHAB W/ECG: CPT

## 2022-11-07 ENCOUNTER — HOSPITAL ENCOUNTER (OUTPATIENT)
Dept: CARDIAC REHAB | Age: 82
Setting detail: THERAPIES SERIES
Discharge: HOME | End: 2022-11-07
Payer: MEDICARE

## 2022-11-07 PROCEDURE — 93798 PHYS/QHP OP CAR RHAB W/ECG: CPT

## 2022-11-09 ENCOUNTER — HOSPITAL ENCOUNTER (OUTPATIENT)
Dept: CARDIAC REHAB | Age: 82
Setting detail: THERAPIES SERIES
Discharge: HOME | End: 2022-11-09
Payer: MEDICARE

## 2022-11-09 PROCEDURE — 93798 PHYS/QHP OP CAR RHAB W/ECG: CPT

## 2022-11-11 ENCOUNTER — HOSPITAL ENCOUNTER (OUTPATIENT)
Dept: CARDIAC REHAB | Age: 82
Setting detail: THERAPIES SERIES
End: 2022-11-11
Payer: MEDICARE

## 2022-11-14 ENCOUNTER — APPOINTMENT (OUTPATIENT)
Dept: CARDIAC REHAB | Age: 82
End: 2022-11-14
Payer: MEDICARE

## 2022-11-16 ENCOUNTER — APPOINTMENT (OUTPATIENT)
Dept: CARDIAC REHAB | Age: 82
End: 2022-11-16
Payer: MEDICARE

## 2022-11-18 ENCOUNTER — APPOINTMENT (OUTPATIENT)
Dept: CARDIAC REHAB | Age: 82
End: 2022-11-18
Payer: MEDICARE

## 2022-11-21 ENCOUNTER — APPOINTMENT (OUTPATIENT)
Dept: CARDIAC REHAB | Age: 82
End: 2022-11-21
Payer: MEDICARE

## 2022-11-23 ENCOUNTER — APPOINTMENT (OUTPATIENT)
Dept: CARDIAC REHAB | Age: 82
End: 2022-11-23
Payer: MEDICARE

## 2022-11-28 ENCOUNTER — APPOINTMENT (OUTPATIENT)
Dept: CARDIAC REHAB | Age: 82
End: 2022-11-28
Payer: MEDICARE

## 2022-11-30 ENCOUNTER — APPOINTMENT (OUTPATIENT)
Dept: CARDIAC REHAB | Age: 82
End: 2022-11-30
Payer: MEDICARE

## 2022-12-02 ENCOUNTER — TELEPHONE (OUTPATIENT)
Dept: FAMILY MEDICINE CLINIC | Age: 82
End: 2022-12-02

## 2022-12-02 NOTE — TELEPHONE ENCOUNTER
LAST VISIT 09/30/2022 WITH DR Oropeza Osgood, NEXT VISIT 01/04/2023 WITH DR BAPTISTE. [address] [facility].

## 2022-12-02 NOTE — TELEPHONE ENCOUNTER
----- Message from Dee Alberto sent at 11/30/2022 12:15 PM EST -----  Subject: Refill Request    QUESTIONS  Name of Medication? lisinopril-hydroCHLOROthiazide (PRINZIDE;ZESTORETIC)   20-12.5 MG per tablet  Patient-reported dosage and instructions? 20-12.5mg (1 tablet per day)  How many days do you have left? 18  Preferred Pharmacy? [id number]  Pharmacy phone number (if available)? [phone]  Additional Information for Provider? Patient had to reschedule his 12/2   appt due to being positive for COVID. He did reschedule for 1/4/23 @   8:40a. He will not have enough medication before then. Please advise. TY  ---------------------------------------------------------------------------  --------------  CALL BACK INFO  What is the best way for the office to contact you? OK to leave message on   voicemail  Preferred Call Back Phone Number? [phone]  ---------------------------------------------------------------------------  --------------  SCRIPT ANSWERS  Relationship to Patient?  Self

## 2022-12-13 RX ORDER — LISINOPRIL AND HYDROCHLOROTHIAZIDE 20; 12.5 MG/1; MG/1
TABLET ORAL
Qty: 30 TABLET | Refills: 1 | Status: SHIPPED | OUTPATIENT
Start: 2022-12-13

## 2022-12-19 ENCOUNTER — TELEPHONE (OUTPATIENT)
Dept: FAMILY MEDICINE CLINIC | Age: 82
End: 2022-12-19

## 2022-12-19 NOTE — TELEPHONE ENCOUNTER
PATIENT CALLED BACK AND SCHEDULED A FOLLOW UP WITH Marvin RESTREPO Wednesday, AND PATIENT ADVISED TO STAY HYDRATED AND TAKE IMMODIUM OTC UNTIL HIS APPT, PATIENT VOICED UNDERSTANDING AND AGREED WITH THE PLAN, HE DID NOT WANT TO GO TO [facility] .  SC

## 2022-12-19 NOTE — TELEPHONE ENCOUNTER
PATIENT CALLED THE NURSE TRIAGE LINE AND C/O SYMPTOMS OF WEAKNESS, HAVING COVID AT THE BEGINNING OF THE MONTH, HAVING DIARRHEA, LOST 20 POUNDS SINCE THE BEGINNING OF THE MONTH, HE WOULD EAT AND THEN HIS STOMACH WOULD RUMBLE AND HE WOULD HAVE TO GET TO THE BATHROOM. I PLACED PATIENT ON HOLD AND WENT TO SPEAK TO LYNNETTE. BASED ON THE SYMPTOMS OF THE PATIENT, LYNNETTE DECIDED IT WOULD BE BETTER TO HAVE PATIENT SEEN AT THE ER. I ADVISED PATIENT OF THIS, HE DID NOT SEEM TOO CONCERNED TO GO TO THE HOSPITAL, HE STATES HE DOES NOT FEEL BAD AND JUST WANTS TO KNOW WHAT TO DO ABOUT HIS STOMACH? HE DENIES ANY NAUSEA OR VOMITING, NO PAIN, JUST RUMBLING AFTER HE EATS, AND DIARRHEA?  SC

## 2022-12-20 RX ORDER — LISINOPRIL AND HYDROCHLOROTHIAZIDE 20; 12.5 MG/1; MG/1
TABLET ORAL
Qty: 30 TABLET | Refills: 1 | OUTPATIENT
Start: 2022-12-20

## 2022-12-20 NOTE — TELEPHONE ENCOUNTER
lisinopril-hydroCHLOROthiazide (PRINZIDE;ZESTORETIC) 20-12.5 MG per tablet 30 tablet 1 12/13/2022     Sig: TAKE ONE TABLET BY MOUTH DAILY    Sent to pharmacy as: Lisinopril-hydroCHLOROthiazide 20-12.5 MG Oral Tablet (PRINZIDE;ZESTORETIC)    Cosign for Ordering: Accepted by Sallie Gilbert MD on 12/13/2022 12:27 PM    E-Prescribing Status: Receipt confirmed by pharmacy (12/13/2022 25:60 AM EST)    DUPLICATE REQUEST

## 2022-12-21 ENCOUNTER — OFFICE VISIT (OUTPATIENT)
Dept: FAMILY MEDICINE CLINIC | Age: 82
End: 2022-12-21
Payer: MEDICARE

## 2022-12-21 VITALS
DIASTOLIC BLOOD PRESSURE: 66 MMHG | OXYGEN SATURATION: 97 % | BODY MASS INDEX: 25.91 KG/M2 | SYSTOLIC BLOOD PRESSURE: 110 MMHG | HEIGHT: 68 IN | HEART RATE: 90 BPM | WEIGHT: 171 LBS

## 2022-12-21 DIAGNOSIS — E78.00 HYPERCHOLESTEREMIA: ICD-10-CM

## 2022-12-21 DIAGNOSIS — I50.22 CHRONIC SYSTOLIC (CONGESTIVE) HEART FAILURE (HCC): ICD-10-CM

## 2022-12-21 DIAGNOSIS — I10 ESSENTIAL HYPERTENSION: ICD-10-CM

## 2022-12-21 DIAGNOSIS — R73.03 PREDIABETES: ICD-10-CM

## 2022-12-21 DIAGNOSIS — R63.4 WEIGHT LOSS: Primary | ICD-10-CM

## 2022-12-21 DIAGNOSIS — I25.10 CORONARY ARTERY DISEASE DUE TO CALCIFIED CORONARY LESION: ICD-10-CM

## 2022-12-21 DIAGNOSIS — I25.84 CORONARY ARTERY DISEASE DUE TO CALCIFIED CORONARY LESION: ICD-10-CM

## 2022-12-21 DIAGNOSIS — R15.2 RECTAL URGENCY: ICD-10-CM

## 2022-12-21 LAB
A/G RATIO: 1.2 (ref 1.1–2.2)
ALBUMIN SERPL-MCNC: 3.4 G/DL (ref 3.4–5)
ALP BLD-CCNC: 284 U/L (ref 40–129)
ALT SERPL-CCNC: 49 U/L (ref 10–40)
ANION GAP SERPL CALCULATED.3IONS-SCNC: 14 MMOL/L (ref 3–16)
AST SERPL-CCNC: 50 U/L (ref 15–37)
BILIRUB SERPL-MCNC: 0.8 MG/DL (ref 0–1)
BUN BLDV-MCNC: 23 MG/DL (ref 7–20)
CALCIUM SERPL-MCNC: 10.6 MG/DL (ref 8.3–10.6)
CHLORIDE BLD-SCNC: 87 MMOL/L (ref 99–110)
CO2: 26 MMOL/L (ref 21–32)
CREAT SERPL-MCNC: 1 MG/DL (ref 0.8–1.3)
GFR SERPL CREATININE-BSD FRML MDRD: >60 ML/MIN/{1.73_M2}
GLUCOSE BLD-MCNC: 513 MG/DL (ref 70–99)
POTASSIUM SERPL-SCNC: 3.9 MMOL/L (ref 3.5–5.1)
SODIUM BLD-SCNC: 127 MMOL/L (ref 136–145)
TOTAL PROTEIN: 6.3 G/DL (ref 6.4–8.2)
TSH REFLEX: 1.81 UIU/ML (ref 0.27–4.2)

## 2022-12-21 PROCEDURE — G8417 CALC BMI ABV UP PARAM F/U: HCPCS | Performed by: FAMILY MEDICINE

## 2022-12-21 PROCEDURE — 3074F SYST BP LT 130 MM HG: CPT | Performed by: FAMILY MEDICINE

## 2022-12-21 PROCEDURE — 1036F TOBACCO NON-USER: CPT | Performed by: FAMILY MEDICINE

## 2022-12-21 PROCEDURE — 36415 COLL VENOUS BLD VENIPUNCTURE: CPT | Performed by: FAMILY MEDICINE

## 2022-12-21 PROCEDURE — G8427 DOCREV CUR MEDS BY ELIG CLIN: HCPCS | Performed by: FAMILY MEDICINE

## 2022-12-21 PROCEDURE — 1123F ACP DISCUSS/DSCN MKR DOCD: CPT | Performed by: FAMILY MEDICINE

## 2022-12-21 PROCEDURE — 3078F DIAST BP <80 MM HG: CPT | Performed by: FAMILY MEDICINE

## 2022-12-21 PROCEDURE — G8484 FLU IMMUNIZE NO ADMIN: HCPCS | Performed by: FAMILY MEDICINE

## 2022-12-21 PROCEDURE — 99214 OFFICE O/P EST MOD 30 MIN: CPT | Performed by: FAMILY MEDICINE

## 2022-12-21 NOTE — PROGRESS NOTES
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 Tolerated pizza recently - was fine driving back from [place]  Did okay last night but comes and goes   Feels a little weak from weight loss  Grandkids here from [facility] - brought covid w/ them. Not really pain - roars/ rolls -no pain- very loose -not a whole  Happening 3-4x/ day. Drinking more gatorade/ water. Eats yogurt in am, bowl of cereal - watching to see how responds - happens couple hours after eating - more he drinks - worse sxs  Drinks coffee in am but cut out oj  Appetite down from not eating well, but coming back.       BP Readings from Last 3 Encounters:   12/21/22 110/66   11/03/22 128/72   09/30/22 128/82     Pulse Readings from Last 3 Encounters:   12/21/22 90   11/03/22 84   09/30/22 76     Wt Readings from Last 3 Encounters:   12/21/22 171 lb (77.6 kg)   11/03/22 193 lb 6.4 oz (87.7 kg)   09/30/22 194 lb (88 kg)            Patient Active Problem List    Diagnosis Date Noted    Essential hypertension 05/16/2011    Hypercholesteremia 05/16/2011    Mixed hyperlipidemia 09/14/2022    PAF (paroxysmal atrial fibrillation) ([facility] [address] 08/31/2022    Aortic stenosis, severe 08/02/2022    Nonrheumatic aortic valve stenosis 07/27/2022    Coronary artery disease due to calcified coronary lesion     SOB (shortness of breath)     Aortic regurgitation     Environmental allergies     Sensorineural hearing loss, bilateral 08/29/2019    Prediabetes 03/21/2019    Arthritis of right ankle 03/07/2018    Ankle arthritis, right 03/21/2017    Dupuytren's contracture 10/26/2016     Past Medical History:   Diagnosis Date    Aortic regurgitation 01/01/2009    Aortic stenosis     Arthritis     Pt says in his right ankle    Asthma     CAD (coronary artery disease)     Environmental allergies     Heart murmur     Hypercholesterolemia     Hypertension     Impaired fasting glucose     Prostate cancer ([facility] [address] 01/01/2006    Samuel     Past Surgical History:   Procedure Laterality Date    AORTIC VALVE REPLACEMENT N/A 08/02/2022    TRANSCATHETER AORTIC VALVE REPLACEMENT FEMORAL APPROACH performed by Buster Meyers MD at [address] [place] [address] N/A 08/02/2022    TRANSCATHETER AORTIC VALVE REPLACEMENT FEMORAL APPROACH performed by Homer Negro MD at [address] [place] [address]  01/2017    Repeat 5 years    CORONARY ANGIOPLASTY WITH STENT PLACEMENT  07/01/2022    HAND SURGERY Right 2012    OTHER SURGICAL HISTORY  2008    RADIOACTIVE SEED IMPLANTS FOR PROSTATE CANCER    PROSTATE SURGERY  2007    seed implant [facility])    [facility] Outpatient Visit on 09/14/2022   Component Date Value Ref Range Status    Left Ventricular Ejection Fraction 09/14/2022 60   Final-Edited    LVEF MODALITY 09/14/2022 ECHO   Final-Edited     Family History   Problem Relation Age of Onset    Cancer Mother         breast    Heart Disease Father 77        MI    Diabetes Paternal Grandmother      Current Outpatient Medications   Medication Sig Dispense Refill    lisinopril-hydroCHLOROthiazide (PRINZIDE;ZESTORETIC) 20-12.5 MG per tablet TAKE ONE TABLET BY MOUTH DAILY 30 tablet 1    apixaban (ELIQUIS) 5 MG TABS tablet Take 1 tablet by mouth 2 times daily 28 tablet 0    dilTIAZem (CARDIZEM CD) 240 MG extended release capsule Take 1 capsule by mouth daily 90 capsule 1    Multiple Vitamins-Minerals (PRESERVISION AREDS PO) Take 1 tablet by mouth daily      atorvastatin (LIPITOR) 80 MG tablet Take 1 tablet by mouth nightly 90 tablet 3    clopidogrel (PLAVIX) 75 MG tablet Take 1 tablet by mouth daily 90 tablet 3    latanoprost (XALATAN) 0.005 % ophthalmic solution Place 1 drop into both eyes nightly. Cholecalciferol (VITAMIN D) 2000 UNITS CAPS capsule 1 capsule daily. With food      Niacin 1000 MG TBCR 1,000 mg daily. No current facility-administered medications for this visit. No Known Allergies    Review of Systems    Objective:  /66 (Site: Left Upper Arm, Position: Sitting, Cuff Size: Medium Adult)   Pulse 90   Ht 5' 8\" (1.727 m)   Wt 171 lb (77.6 kg)   SpO2 97%   BMI 26.00 kg/m²     BP Readings from Last 3 Encounters:   12/21/22 110/66   11/03/22 128/72   09/30/22 128/82       Pulse Readings from Last 3 Encounters:   12/21/22 90   11/03/22 84   09/30/22 76       Wt Readings from Last 3 Encounters:   12/21/22 171 lb (77.6 kg)   11/03/22 193 lb 6.4 oz (87.7 kg)   09/30/22 194 lb (88 kg)       Physical Exam  Constitutional:       General: He is not in acute distress. Appearance: He is well-developed. Eyes:      General: No scleral icterus. Conjunctiva/sclera: Conjunctivae normal.   Cardiovascular:      Rate and Rhythm: Normal rate and regular rhythm. Heart sounds: Normal heart sounds. No murmur heard. No gallop. Pulmonary:      Effort: Pulmonary effort is normal. No respiratory distress. Breath sounds: Normal breath sounds. No wheezing, rhonchi or rales. Abdominal:      General: Bowel sounds are normal. There is no distension. Palpations: Abdomen is soft. Tenderness: There is no abdominal tenderness.       Comments: Diastasis recti o/w no mass/ ttp   Musculoskeletal: General: No swelling. Skin:     Findings: No erythema or rash. Neurological:      Mental Status: He is alert. Assessment/Plan:      Diagnosis Orders   1. Weight loss        2. Chronic systolic (congestive) heart failure        3. Rectal urgency        4. Essential hypertension        5. Hypercholesteremia        6. Prediabetes        7. Coronary artery disease due to calcified coronary lesion        Check labs  Monitor weight - bland diet and gradually increase w/ probiotics encouraged  Gradually increase fiber  Gradually increase activity  Consider cheese, yogurt, nuts, peanut butter  and/ or boost/ ensure supplement for period of time  Hydration d/w pt  Role of covid infection dw /pt - doubt role for medication in symptoms.   D/w pt colonoscopy and f/u dr. Queen Guzman regarding repeat colonoscopy  Doing well CV  Bp controlled on prinizide/ diltiazem  On plavix/ eliquis per cardio  F/u if worsening / persistent Carlos Person MD, MD  12/21/2022  11:28 AM

## 2022-12-22 LAB
ESTIMATED AVERAGE GLUCOSE: 366.6 MG/DL
HBA1C MFR BLD: 14.4 %

## 2022-12-29 RX ORDER — BLOOD-GLUCOSE METER
1 KIT MISCELLANEOUS DAILY
Qty: 1 KIT | Refills: 0 | Status: SHIPPED | OUTPATIENT
Start: 2022-12-29

## 2022-12-29 RX ORDER — LANCETS 30 GAUGE
1 EACH MISCELLANEOUS DAILY
Qty: 100 EACH | Refills: 3 | Status: SHIPPED | OUTPATIENT
Start: 2022-12-29

## 2022-12-29 RX ORDER — GLUCOSAMINE HCL/CHONDROITIN SU 500-400 MG
CAPSULE ORAL
Qty: 100 STRIP | Refills: 3 | Status: SHIPPED | OUTPATIENT
Start: 2022-12-29

## 2023-01-04 ENCOUNTER — OFFICE VISIT (OUTPATIENT)
Dept: FAMILY MEDICINE CLINIC | Age: 83
End: 2023-01-04
Payer: MEDICARE

## 2023-01-04 VITALS
BODY MASS INDEX: 26.46 KG/M2 | WEIGHT: 174 LBS | HEART RATE: 90 BPM | SYSTOLIC BLOOD PRESSURE: 102 MMHG | OXYGEN SATURATION: 99 % | DIASTOLIC BLOOD PRESSURE: 60 MMHG

## 2023-01-04 DIAGNOSIS — Z79.4 TYPE 2 DIABETES MELLITUS WITH HYPERGLYCEMIA, WITH LONG-TERM CURRENT USE OF INSULIN (HCC): ICD-10-CM

## 2023-01-04 DIAGNOSIS — E11.65 TYPE 2 DIABETES MELLITUS WITH HYPERGLYCEMIA, WITH LONG-TERM CURRENT USE OF INSULIN (HCC): ICD-10-CM

## 2023-01-04 DIAGNOSIS — E11.9 NEW ONSET TYPE 2 DIABETES MELLITUS (HCC): Primary | ICD-10-CM

## 2023-01-04 DIAGNOSIS — L98.9 SKIN LESION OF FACE: ICD-10-CM

## 2023-01-04 DIAGNOSIS — I50.22 CHRONIC SYSTOLIC (CONGESTIVE) HEART FAILURE (HCC): ICD-10-CM

## 2023-01-04 DIAGNOSIS — I48.0 PAF (PAROXYSMAL ATRIAL FIBRILLATION) (HCC): ICD-10-CM

## 2023-01-04 PROCEDURE — 3078F DIAST BP <80 MM HG: CPT | Performed by: FAMILY MEDICINE

## 2023-01-04 PROCEDURE — G8417 CALC BMI ABV UP PARAM F/U: HCPCS | Performed by: FAMILY MEDICINE

## 2023-01-04 PROCEDURE — G8484 FLU IMMUNIZE NO ADMIN: HCPCS | Performed by: FAMILY MEDICINE

## 2023-01-04 PROCEDURE — 3074F SYST BP LT 130 MM HG: CPT | Performed by: FAMILY MEDICINE

## 2023-01-04 PROCEDURE — 1123F ACP DISCUSS/DSCN MKR DOCD: CPT | Performed by: FAMILY MEDICINE

## 2023-01-04 PROCEDURE — 99214 OFFICE O/P EST MOD 30 MIN: CPT | Performed by: FAMILY MEDICINE

## 2023-01-04 PROCEDURE — G8427 DOCREV CUR MEDS BY ELIG CLIN: HCPCS | Performed by: FAMILY MEDICINE

## 2023-01-04 PROCEDURE — 1036F TOBACCO NON-USER: CPT | Performed by: FAMILY MEDICINE

## 2023-01-04 ASSESSMENT — PATIENT HEALTH QUESTIONNAIRE - PHQ9
2. FEELING DOWN, DEPRESSED OR HOPELESS: 0
SUM OF ALL RESPONSES TO PHQ QUESTIONS 1-9: 0
SUM OF ALL RESPONSES TO PHQ9 QUESTIONS 1 & 2: 0
SUM OF ALL RESPONSES TO PHQ QUESTIONS 1-9: 0
1. LITTLE INTEREST OR PLEASURE IN DOING THINGS: 0
SUM OF ALL RESPONSES TO PHQ QUESTIONS 1-9: 0
SUM OF ALL RESPONSES TO PHQ QUESTIONS 1-9: 0

## 2023-01-04 NOTE — PROGRESS NOTES
[facility] Medicine  Clinic Note    Date: 1/4/2023                                               Subjective:     Chief Complaint   Patient presents with    Hypertension     HTN ROUTINE FOLLOW UP     Diabetes     New dx of diabetes      HPI  Recent a1c 14.4% - d/w pt's wife results and soliqua started  Mild elevation lft's, Na - 127 -no acidosis , nl gfr but bs 513 w/ normal tsh 1.8  Gi symptoms doing better overall. - feeling better - weight up a few pounds  Trouble w/ glucose meter. Cataract surgery planned - no retinopathy.   Doing okay w / medication - has remained at 15 units since starting and bs running down from 300's to 287 this am  Has a lot of questions regarding diet  Also question about spot on face - unable to get removed 2/2 shortage of lidocaine w/ epi        BP Readings from Last 3 Encounters:   01/04/23 102/60   12/21/22 110/66   11/03/22 128/72     Pulse Readings from Last 3 Encounters:   01/04/23 90   12/21/22 90   11/03/22 84     Wt Readings from Last 3 Encounters:   01/04/23 174 lb (78.9 kg)   12/21/22 171 lb (77.6 kg)   11/03/22 193 lb 6.4 oz (87.7 kg)            Patient Active Problem List    Diagnosis Date Noted    Essential hypertension 05/16/2011    Hypercholesteremia 05/16/2011    Chronic systolic (congestive) heart failure 12/21/2022    Mixed hyperlipidemia 09/14/2022    PAF (paroxysmal atrial fibrillation) ([facility] [address] 08/31/2022    Aortic stenosis, severe 08/02/2022    Nonrheumatic aortic valve stenosis 07/27/2022    Coronary artery disease due to calcified coronary lesion     SOB (shortness of breath)     Aortic regurgitation     Environmental allergies     Sensorineural hearing loss, bilateral 08/29/2019    Prediabetes 03/21/2019    Arthritis of right ankle 03/07/2018    Ankle arthritis, right 03/21/2017    Dupuytren's contracture 10/26/2016     Past Medical History:   Diagnosis Date    Aortic regurgitation 01/01/2009    Aortic stenosis     Arthritis     Pt says in his right ankle Asthma     CAD (coronary artery disease)     Environmental allergies     Heart murmur     Hypercholesterolemia     Hypertension     Impaired fasting glucose     Prostate cancer ([address] 01/01/2006    Samuel     Past Surgical History:   Procedure Laterality Date    AORTIC VALVE REPLACEMENT N/A 08/02/2022    TRANSCATHETER AORTIC VALVE REPLACEMENT FEMORAL APPROACH performed by Sharon Berkowitz MD at [address] [place] [address] N/A 08/02/2022    TRANSCATHETER AORTIC VALVE REPLACEMENT FEMORAL APPROACH performed by Willian Negro MD at [address] [place] [address]   01/2017    Repeat 5 years    CORONARY ANGIOPLASTY WITH STENT PLACEMENT  07/01/2022    HAND SURGERY Right 2012    OTHER SURGICAL HISTORY  2008    RADIOACTIVE SEED IMPLANTS FOR PROSTATE CANCER    PROSTATE SURGERY  2007    seed implant [facility])    SKIN BIOPSY      TONSILLECTOMY       Office Visit on 12/21/2022   Component Date Value Ref Range Status    TSH 12/21/2022 1.81  0.27 - 4.20 uIU/mL Final    Hemoglobin A1C 12/21/2022 14.4  See comment % Final    Comment: Comment:  Diagnosis of Diabetes: > or = 6.5%  Increased risk of diabetes (Prediabetes): 5.7-6.4%  Glycemic Control: Nonpregnant Adults: <7.0%                    Pregnant: <6.0%        eAG 12/21/2022 366.6  mg/dL Final    Sodium 12/21/2022 127 (A)  136 - 145 mmol/L Final    Potassium 12/21/2022 3.9  3.5 - 5.1 mmol/L Final    Chloride 12/21/2022 87 (A)  99 - 110 mmol/L Final    CO2 12/21/2022 26  21 - 32 mmol/L Final    Anion Gap 12/21/2022 14  3 - 16 Final    Glucose 12/21/2022 513 (A)  70 - 99 mg/dL Final    BUN 12/21/2022 23 (A)  7 - 20 mg/dL Final    Creatinine 12/21/2022 1.0  0.8 - 1.3 mg/dL Final    Est, Glom Filt Rate 12/21/2022 >60  >60 Final    Comment: Pediatric calculator link  [employer].at. org/professionals/kdoqi/gfr_calculatorped  Effective Oct 3, 2022  These results are not intended for use in patients  <25years of age.   eGFR results are calculated without  a race factor using the 2021 CKD-EPI equation. Careful  clinical correlation is recommended, particularly when  comparing to results calculated using previous equations. The CKD-EPI equation is less accurate in patients with  extremes of muscle mass, extra-renal metabolism of  creatinine, excessive creatinine ingestion, or following  therapy that affects renal tubular secretion. Calcium 12/21/2022 10.6  8.3 - 10.6 mg/dL Final    Total Protein 12/21/2022 6.3 (A)  6.4 - 8.2 g/dL Final    Albumin 12/21/2022 3.4  3.4 - 5.0 g/dL Final    Albumin/Globulin Ratio 12/21/2022 1.2  1.1 - 2.2 Final    Total Bilirubin 12/21/2022 0.8  0.0 - 1.0 mg/dL Final    Alkaline Phosphatase 12/21/2022 284 (A)  40 - 129 U/L Final    ALT 12/21/2022 49 (A)  10 - 40 U/L Final    AST 12/21/2022 50 (A)  15 - 37 U/L Final     Family History   Problem Relation Age of Onset    Cancer Mother         breast    Heart Disease Father 77        MI    Diabetes Paternal Grandmother      Current Outpatient Medications   Medication Sig Dispense Refill    Insulin Glargine-Lixisenatide 100-33 UNT-MCG/ML SOPN 15-30 units sc daily 15 Adjustable Dose Pre-filled Pen Syringe 2    glucose monitoring (FREESTYLE FREEDOM) kit 1 kit by Does not apply route daily 1 kit 0    blood glucose monitor strips Test 1 times a day & as needed for symptoms of irregular blood glucose. Dispense sufficient amount for indicated testing frequency plus additional to accommodate PRN testing needs.  100 strip 3    Lancets MISC 1 each by Does not apply route daily Use 1-3x/ day as directed 100 each 3    lisinopril-hydroCHLOROthiazide (PRINZIDE;ZESTORETIC) 20-12.5 MG per tablet TAKE ONE TABLET BY MOUTH DAILY 30 tablet 1    apixaban (ELIQUIS) 5 MG TABS tablet Take 1 tablet by mouth 2 times daily 28 tablet 0    dilTIAZem (CARDIZEM CD) 240 MG extended release capsule Take 1 capsule by mouth daily 90 capsule 1    Multiple Vitamins-Minerals (PRESERVISION AREDS PO) Take 1 tablet by mouth daily atorvastatin (LIPITOR) 80 MG tablet Take 1 tablet by mouth nightly 90 tablet 3    clopidogrel (PLAVIX) 75 MG tablet Take 1 tablet by mouth daily 90 tablet 3    latanoprost (XALATAN) 0.005 % ophthalmic solution Place 1 drop into both eyes nightly. Cholecalciferol (VITAMIN D) 2000 UNITS CAPS capsule 1 capsule daily. With food      Niacin 1000 MG TBCR 1,000 mg daily. No current facility-administered medications for this visit. No Known Allergies    Review of Systems    Objective:  /60 (Site: Left Upper Arm, Position: Sitting, Cuff Size: Medium Adult)   Pulse 90   Wt 174 lb (78.9 kg)   SpO2 99%   BMI 26.46 kg/m²     BP Readings from Last 3 Encounters:   01/04/23 102/60   12/21/22 110/66   11/03/22 128/72       Pulse Readings from Last 3 Encounters:   01/04/23 90   12/21/22 90   11/03/22 84       Wt Readings from Last 3 Encounters:   01/04/23 174 lb (78.9 kg)   12/21/22 171 lb (77.6 kg)   11/03/22 193 lb 6.4 oz (87.7 kg)       Physical Exam  Vitals and nursing note reviewed. Constitutional:       Appearance: He is well-developed. HENT:      Head: Normocephalic and atraumatic. Eyes:      General: No scleral icterus. Conjunctiva/sclera: Conjunctivae normal.   Pulmonary:      Effort: Pulmonary effort is normal.   Skin:     General: Skin is warm and dry. Comments: Small raised papule right cheek w/ eschar   Neurological:      Mental Status: He is alert and oriented to person, place, and time. Assessment/Plan:      Diagnosis Orders   1. New onset type 2 diabetes mellitus ([address]        2. Type 2 diabetes mellitus with hyperglycemia, with long-term current use of insulin (HCC)        3. Chronic systolic (congestive) heart failure ([address]        4. PAF (paroxysmal atrial fibrillation) ([address]        5.  Skin lesion of face          D/w pt option of removing facial lesion - bx as possible bcc but will need to hold eliquis 24-48 hours since no epinephrine - will check w/ cardiologist about this  D/w pt and wife soliqua adjustment 1-2units every 2-3 days to goal fasting bs   Hypoglycemic precautions, eye exams (done recently), foot care d/w pt and wife  Micro/ macrocomplications and tx options d/w pt   Will cont soliqua for now as tolerating well  Reduce simple carbs d/w pt in detail and information provided  Recheck a1c/ cmp again 3/30  F/u cardio routinely  O/w f/u prn  Marcy Turner MD, MD  1/4/2023  8:54 AM

## 2023-01-10 ENCOUNTER — PROCEDURE VISIT (OUTPATIENT)
Dept: AUDIOLOGY | Age: 83
End: 2023-01-10
Payer: MEDICARE

## 2023-01-10 ENCOUNTER — OFFICE VISIT (OUTPATIENT)
Dept: ENT CLINIC | Age: 83
End: 2023-01-10

## 2023-01-10 VITALS
BODY MASS INDEX: 25.62 KG/M2 | TEMPERATURE: 97.1 F | HEIGHT: 69 IN | SYSTOLIC BLOOD PRESSURE: 108 MMHG | DIASTOLIC BLOOD PRESSURE: 71 MMHG | WEIGHT: 173 LBS | HEART RATE: 77 BPM | OXYGEN SATURATION: 98 %

## 2023-01-10 DIAGNOSIS — H90.3 SENSORINEURAL HEARING LOSS (SNHL) OF BOTH EARS: Primary | ICD-10-CM

## 2023-01-10 DIAGNOSIS — H61.23 BILATERAL IMPACTED CERUMEN: ICD-10-CM

## 2023-01-10 PROCEDURE — 92557 COMPREHENSIVE HEARING TEST: CPT | Performed by: AUDIOLOGIST

## 2023-01-10 NOTE — PROGRESS NOTES
[address] VISIT      Patient Name: [address] Record Number:  [id number]  Primary Care Physician:  Ange Burkett MD    ChiefComplaint     Chief Complaint   Patient presents with    Ear Problem     Ear cleaning , hearing eval after       History of Present Illness     Saad Pierce is an [de-identified] y.o. male previously seen for bilateral sensorineural hearing loss, asymmetrical sensorineural hearing loss, bilateral impacted cerumen. Last seen 6/13/2022. Interval History:   He had his recent cardiac procedures and is doing well now wants to look into his hearing loss. No recent hearing changes since his last visit but has chronic bilateral hearing loss.     Past Medical History     Past Medical History:   Diagnosis Date    Aortic regurgitation 01/01/2009    Aortic stenosis     Arthritis     Pt says in his right ankle    Asthma     CAD (coronary artery disease)     Environmental allergies     Heart murmur     Hypercholesterolemia     Hypertension     Impaired fasting glucose     Prostate cancer ([address] 01/01/2006    Samuel       Past Surgical History     Past Surgical History:   Procedure Laterality Date    AORTIC VALVE REPLACEMENT N/A 08/02/2022    TRANSCATHETER AORTIC VALVE REPLACEMENT FEMORAL APPROACH performed by Ciara Larkin MD at [facility] 08/02/2022    TRANSCATHETER AORTIC VALVE REPLACEMENT FEMORAL APPROACH performed by Willian Negro MD at [address] [place] [address]   01/2017    Repeat 5 years    CORONARY ANGIOPLASTY WITH STENT PLACEMENT  07/01/2022    HAND SURGERY Right 2012    OTHER SURGICAL HISTORY  2008    RADIOACTIVE SEED IMPLANTS FOR PROSTATE CANCER    PROSTATE SURGERY  2007    seed implant [facility])    SKIN BIOPSY      TONSILLECTOMY         Family History     Family History   Problem Relation Age of Onset    Cancer Mother         breast    Heart Disease Father 77        MI Diabetes Paternal Grandmother        Social History     Social History     Tobacco Use    Smoking status: Never    Smokeless tobacco: Never    Tobacco comments:     counselled to never start   Vaping Use    Vaping Use: Never used   Substance Use Topics    Alcohol use: No     Comment: RARE    Drug use: No        Allergies     No Known Allergies    Medications     Current Outpatient Medications   Medication Sig Dispense Refill    Insulin Glargine-Lixisenatide 100-33 UNT-MCG/ML SOPN 15-30 units sc daily 15 Adjustable Dose Pre-filled Pen Syringe 2    glucose monitoring (FREESTYLE FREEDOM) kit 1 kit by Does not apply route daily 1 kit 0    blood glucose monitor strips Test 1 times a day & as needed for symptoms of irregular blood glucose. Dispense sufficient amount for indicated testing frequency plus additional to accommodate PRN testing needs. 100 strip 3    Lancets MISC 1 each by Does not apply route daily Use 1-3x/ day as directed 100 each 3    lisinopril-hydroCHLOROthiazide (PRINZIDE;ZESTORETIC) 20-12.5 MG per tablet TAKE ONE TABLET BY MOUTH DAILY 30 tablet 1    apixaban (ELIQUIS) 5 MG TABS tablet Take 1 tablet by mouth 2 times daily 28 tablet 0    dilTIAZem (CARDIZEM CD) 240 MG extended release capsule Take 1 capsule by mouth daily 90 capsule 1    Multiple Vitamins-Minerals (PRESERVISION AREDS PO) Take 1 tablet by mouth daily      atorvastatin (LIPITOR) 80 MG tablet Take 1 tablet by mouth nightly 90 tablet 3    clopidogrel (PLAVIX) 75 MG tablet Take 1 tablet by mouth daily 90 tablet 3    latanoprost (XALATAN) 0.005 % ophthalmic solution Place 1 drop into both eyes nightly. Niacin 1000 MG TBCR 1,000 mg daily. Cholecalciferol (VITAMIN D) 2000 UNITS CAPS capsule 1 capsule daily. With food (Patient not taking: Reported on 1/10/2023)       No current facility-administered medications for this visit.        Review of Systems     REVIEW OF SYSTEM: See HPI above    PhysicalExam     Vitals:    01/10/23 1037   BP: 108/71   Pulse: 77   Temp: 97.1 °F (36.2 °C)   TempSrc: Temporal   SpO2: 98%   Weight: 173 lb (78.5 kg)   Height: 5' 9\" (1.753 m)       PHYSICAL EXAM  /71   Pulse 77   Temp 97.1 °F (36.2 °C) (Temporal)   Ht 5' 9\" (1.753 m)   Wt 173 lb (78.5 kg)   SpO2 98%   BMI 25.55 kg/m²     GENERAL: No acute distress, alert and oriented. EYES: EOMI, Anti-icteric. NOSE: On anterior rhinoscopy there is no epistaxis, nasal mucosa moist and normal appearing, no purulent drainage. EARS: Normal external appearance; on portable otomicroscopy with cerumen impaction, see procedure note below. FACE: HB 1/6 bilaterally, symmetric appearing, sensation equal bilaterally  ORAL CAVITY: No masses or lesions visualized or palpated, uvula is midline, moist mucous membranes, no oropharyngeal masses or oropharyngeal obstruction  NECK: Normal range of motion, no thyromegaly, trachea is midline, no palpable lymphadenopathy or neck masses, no crepitus  CHEST: Normal respiratory effort, breathing comfortably, no retractions  SKIN: No rashes, normal appearing skin, no evidence of skin lesions/tumors  NEURO: Cranial Nerves 2, 3, 4, 5, 6, 7, 11, 12 grossly intact bilaterally     I have performed a head and neck physical exam personally or was physically present during the key or critical portions of the service. Procedure     Procedure: Binocular otomicroscopy with debridement of cerumen impaction    Pre-op: Cerumen impaction of the bilateral external auditory canals.    Post op: Same  Procedure : Binocular otomicroscopy with debridement of cerumen of bilateral external auditory canals  Surgeon: Dr. Bandar Copeland DO  Estimated Blood Loss: None    Description of Procedure:    After obtaining verbal consent, the patient was placed in the examination chair in the reclined position.      -Right ear: External auditory canal with occluding cerumen limiting visualization of the tympanic membrane which was removed with use of #7 and #5 Filipino suction, alligator forceps, and cerumen loop curet.  After successful removal of cerumen, the right tympanic membrane was visualized and without significant retractions or cholesteatoma, no middle ear effusions.   -Left ear: External auditory canal with occluding cerumen limiting visualization of the tympanic membrane which was removed with use of #7 and #5 french suction, alligator forceps, and cerumen loop curet.  After successful removal of cerumen, the left tympanic membrane was visualized and without significant retractions or cholesteatoma, no middle ear effusions.     * Patient tolerated the procedure well with no complications    Data/Imaging Review     Media Information  Document Information    [facility] Clinical:  Audiology   revised audiogram/tympanogram 6/13/2022 06/13/2022   Attached To:   Procedure visit on 6/13/22 with Joanna Salinas     Source Information    Joanna Salinas  [facility]       Assessment and Plan     1. Sensorineural hearing loss (SNHL) of both ears  - Would likely benefit from amplification with hearing aids.  Provided with hearing aid clearance form as well as copy of audiogram.  - Encouraged loud noise avoidance and wearing of hearing protection when exposed.  - Recommend surveillance of hearing with comprehensive audiological evaluation in 1-2 years.     2. Bilateral impacted cerumen  - Cerumen debrided in the office today  - Avoid Q-tip and self instrumentation of ears  - Hydrogen peroxide or Debrox (OTC) drops as needed or once every other week to help break up and soften wax  - Follow-up as needed for repeat debridement    Follow-Up     Return if symptoms worsen or fail to improve.      DO Al Coyne [facility]  Department of Otolaryngology/Head and Neck Surgery  1/10/23    Medical Decision Making:  The following items were considered in medical decision making:  Independent review of images  Review / order clinical  lab tests  Review / order radiology tests  Decision to obtain old records      This note was generated completely or in part utilizing Dragon dictation speech recognition software. Occasionally, words are mistranscribed and despite editing, the text may contain inaccuracies due to incorrect word recognition. If further clarification is needed please contact the office at [phone].

## 2023-01-10 NOTE — PROGRESS NOTES
[address] [place] [address]   Division of Audiology/Otolaryngology    1/10/2023     Patient name: Familia Molina  Primary Care Physician: Jesus Leonardo MD   Medical Record Number: [id number]     Familia Molina   1940, [de-identified] y.o. male   [id number]       Referring Provider: Evelia Underwood DO  Referral Type: In an order in [address] [place] [address]    Reason for Visit: Evaluation of the cause of disorders of hearing, tinnitus, or balance. ADULT AUDIOLOGIC EVALUATION                    Familia Molina is a [de-identified] y.o. male seen today, 1/10/2023, for audiologic evaluation. Patient was seen by referring provider Evelia Underwood DO  for ear cleaning of both ears prior to today's visit. Patient was alone. AUDIOLOGIC AND OTHER PERTINENT MEDICAL HISTORY:      Familia Mloina presents with history of decreased hearing. Patient states hearing has declined from prior audiogram done in June 2022. He notes increased difficulty with clarity of speech while conversing with others. [address] [facility] for atrial fibrillation. No other otologic factors noted. IMPRESSIONS:      Results are consistent with sensorineural hearing loss of right ear with excellent word recognition and normal middle ear function. Mild to severe sensorineural loss of left ear with excellent word recognition and normal middle ear function. Hearing loss is significant enough to result in difficulty understanding speech in most listening environments. Patient is a candidate for amplification, recommended hearing aid evaluation. Patient to follow medical recommendations per  Evelia Underwood DO.    ASSESSMENT AND FINDINGS:     Otoscopy revealed: Visible tympanic membrane bilaterally    Reliability: Good   Transducer: Inserts (checked with headphones)     RIGHT EAR:  Hearing Sensitivity: Mild to severe sensorineural hearing loss.   Speech Recognition Threshold: 45 dB HL  Word Recognition: Excellent (%), based on NU-6   Tympanometry: Could not maintain seal  Acoustic Reflexes: Ipsilateral: Could not maintain seal.     LEFT EAR:  Hearing Sensitivity: Mild to severe sensorineural hearing loss. Speech Recognition Threshold: 35 dB HL  Word Recognition: Excellent (%), based on NU-6   Tympanometry: Could not maintain seal  Acoustic Reflexes: Ipsilateral: Could not maintain seal.     NOTE: An asymmetry of > 10 dB is present at 3000 Hz in left ear  NOTE: Slight decrease in left ear thresholds, otherwise remained stable        Prior audiogram 6/13/2022    COUNSELING:      Reviewed purpose of testing completed today, general auditory system function, and results obtained today. The following items are recommended based on patient report and results from today's appointment:   - Continue medical follow-up with Ninfa Sidhu DO.   - Retest hearing as medically indicated and/or sooner if a change in hearing is noted. - If desired, schedule a Hearing Aid Evaluation (HAE) appointment to discuss hearing aid options. Chart CC'd to: Ninfa Sidhu DO      Degree of   Hearing Sensitivity dB Range   Within Normal Limits (WNL) 0 - 20   Mild 20 - 40   Moderate 40 - 55   Moderately-Severe 55 - 70   Severe 70 - 90   Profound 90 +        RECOMMENDATIONS:        Ninfa Sidhu DO to medically advise. Comprehensive audiological evaluation in 1-2 years.      Joanna Palmer  Audiologist    Electronically signed by Joanna Palmer on 1/10/23 at 11:46 AM.

## 2023-01-12 RX ORDER — LISINOPRIL AND HYDROCHLOROTHIAZIDE 20; 12.5 MG/1; MG/1
TABLET ORAL
Qty: 30 TABLET | Refills: 1 | Status: SHIPPED | OUTPATIENT
Start: 2023-01-12

## 2023-01-26 RX ORDER — DILTIAZEM HYDROCHLORIDE 240 MG/1
CAPSULE, COATED, EXTENDED RELEASE ORAL
Qty: 90 CAPSULE | Refills: 0 | Status: SHIPPED | OUTPATIENT
Start: 2023-01-26

## 2023-02-22 RX ORDER — LISINOPRIL AND HYDROCHLOROTHIAZIDE 20; 12.5 MG/1; MG/1
TABLET ORAL
Qty: 90 TABLET | Refills: 2 | Status: SHIPPED | OUTPATIENT
Start: 2023-02-22

## 2023-03-03 DIAGNOSIS — M19.071 ARTHRITIS OF RIGHT ANKLE: ICD-10-CM

## 2023-03-06 RX ORDER — NAPROXEN 500 MG/1
TABLET ORAL
Qty: 60 TABLET | Refills: 0 | OUTPATIENT
Start: 2023-03-06

## 2023-03-14 RX ORDER — LISINOPRIL AND HYDROCHLOROTHIAZIDE 20; 12.5 MG/1; MG/1
TABLET ORAL
Qty: 30 TABLET | OUTPATIENT
Start: 2023-03-14

## 2023-03-14 NOTE — TELEPHONE ENCOUNTER
LV 1/4/23 WITH DR BAPTISTE FOR [facility] [place] 3/21/23  lisinopril-hydroCHLOROthiazide (PRINZIDE;ZESTORETIC) 20-12.5 MG per tablet 90 tablet 2 2/22/2023     Sig: TAKE ONE TABLET BY MOUTH DAILY    Sent to pharmacy as: Lisinopril-hydroCHLOROthiazide 20-12.5 MG Oral Tablet (PRINZIDE;ZESTORETIC)    Cosign for Ordering: Accepted by Merary Jimenez MD on 2/22/2023  6:19 PM    E-Prescribing Status: Receipt confirmed by pharmacy (2/22/2023 10:07 AM EST)    90 DAY SUPPLY X 2 REFILLS SENT TO [facility]  ON 2/22/23

## 2023-03-15 NOTE — PATIENT INSTRUCTIONS
Ok to hold Eliquis for 48 hours prior to derm procedure.  Also take a dose of antibiotic prior to the derm procedure

## 2023-03-15 NOTE — PROGRESS NOTES
[address]  H+P  Consult  OP Visit  FU Visit   CC/HPI   CC Followup visit for cardiac conditions detailed in assessment and plan below. Intervention PCI, TAVR   General Doing well. No new concerns. Cardiac Sx -CP, -SOB, -dizziness, -syncope, -edema, -orthopnea, -pnd   HISTORY/ALLERGY/ROS   MEDHx  has a past medical history of Aortic regurgitation, Aortic stenosis, Arthritis, Asthma, CAD (coronary artery disease), Environmental allergies, Heart murmur, Hypercholesterolemia, Hypertension, Impaired fasting glucose, and Prostate cancer ([facility] [address] SURGHx  has a past surgical history that includes other surgical history (2008); Prostate surgery (2007); Colonoscopy (01/2017); Hand surgery (Right, 2012); Coronary angioplasty with stent (07/01/2022); Aortic valve replacement (N/A, 08/02/2022); Aortic valve replacement (N/A, 08/02/2022); skin biopsy; and Tonsillectomy. SOCHx  reports that he has never smoked. He has never used smokeless tobacco. He reports that he does not drink alcohol and does not use drugs. FAMHx family history includes Cancer in his mother; Diabetes in his paternal grandmother; Heart Disease (age of onset: 77) in his father. ALLERG Patient has no known allergies. ROS Full ROS obtained and negative except as mentioned in HPI   MEDICATIONS   Current Outpatient Medications   Medication Sig Dispense Refill    lisinopril-hydroCHLOROthiazide (PRINZIDE;ZESTORETIC) 20-12.5 MG per tablet TAKE ONE TABLET BY MOUTH DAILY 90 tablet 2    dilTIAZem (CARDIZEM CD) 240 MG extended release capsule TAKE 1 CAPSULE EVERY DAY 90 capsule 0    Insulin Glargine-Lixisenatide 100-33 UNT-MCG/ML SOPN 15-30 units sc daily 15 Adjustable Dose Pre-filled Pen Syringe 2    glucose monitoring (FREESTYLE FREEDOM) kit 1 kit by Does not apply route daily 1 kit 0    blood glucose monitor strips Test 1 times a day & as needed for symptoms of irregular blood glucose.  Dispense sufficient amount for indicated testing frequency plus additional to accommodate PRN testing needs. 100 strip 3    Lancets MISC 1 each by Does not apply route daily Use 1-3x/ day as directed 100 each 3    apixaban (ELIQUIS) 5 MG TABS tablet Take 1 tablet by mouth 2 times daily 28 tablet 0    Multiple Vitamins-Minerals (PRESERVISION AREDS PO) Take 1 tablet by mouth daily      atorvastatin (LIPITOR) 80 MG tablet Take 1 tablet by mouth nightly 90 tablet 3    clopidogrel (PLAVIX) 75 MG tablet Take 1 tablet by mouth daily 90 tablet 3    latanoprost (XALATAN) 0.005 % ophthalmic solution Place 1 drop into both eyes nightly. Cholecalciferol (VITAMIN D) 2000 UNITS CAPS capsule 1 capsule daily. With food (Patient not taking: Reported on 1/10/2023)      Niacin 1000 MG TBCR 1,000 mg daily. No current facility-administered medications for this visit. PHYSICAL EXAM   Vitals /70 (Site: Right Upper Arm, Position: Sitting, Cuff Size: Medium Adult)   Pulse 72   Ht 5' 9\" (1.753 m)   Wt 170 lb (77.1 kg)   SpO2 97%   BMI 25.10 kg/m²    Gen Alert, coop, no distress Heart  RRR, no MRG   Head NC, AT, no abnorm Abd  Soft, NT, +BS, no mass, no OM   Eyes PER, conj/corn clear Ext  Ext nl, AT, no C/C/E   Nose Nares nl, no drain, NT Pulse 2+ and symmetric   Throat Lips, mucosa, tongue nl Skin Col/text/turg nl, no vis rash/les   Neck S/S, TM, NT, no bruit/JVD Psych Nl mood and affect   Lung CTA-B, unlabored, no DTP Lymph   No cervical or axillary LA   Ch wall NT, no deform Neuro  Nl gross M/S exam      COMPLIANCE   Discussed and counseled on diet, exercise, weight loss, smoking, alcohol, drugs. All questions answered. CODING   SCI (23078) - 30-39 mins spent reviewing hx/tests/consults, performing exam, counseling/educating, ordering meds/tests/procedures, referring/communicating w/PCP/consultants, documenting in EMR, interpreting results, communicating medical information and plan with family.    [address]  [employer] deacon Langford scribing for and in the presence of Dannis Hashimoto, MD.   Signed, Davina Mejia RN. 3/15/2023 2:26 PM    Doctor Davina Mejia is working as a scribe for and in the presence of me Dannis Hashimoto, MD). Working as a scribe, Davina Mejia may have prepopulated components of this note with my historical  intellectual property under my direct supervision. Any additions to this intellectual property were performed in my presence and at my direction. Furthermore, the content and accuracy of this note have been reviewed by me Dannis Hashimoto, MD).    ASSESSMENT AND PLAN   *AS/CAD    Date EF Detail   Sx     As above   NYHA     II   Hx 8/22   TAVR 29 mm Tovar Ultra   TTE 6/14 4/22 8/22 9/22 55%  60%  60%  60% Mod AI  Severe AS MG 42, Mild to mod AI  TAVR MG 12  TAVR MG 12   C 7/22   PCI of LAD Carlosmelissa Buck)   Plan     Doing well post TAVR  Stop plavix, start asa 81   *AFIB  Status parox   Plan Eliquis   *HTN  Status Controlled   Plan Continue current antihypertensives at doses above   *CHOL  LDL 67, 3/22   Plan Counseled on diet/exercise/weight, continue HI/MT statin   *FOLLOWUP  12 months

## 2023-03-16 ENCOUNTER — OFFICE VISIT (OUTPATIENT)
Dept: CARDIOLOGY CLINIC | Age: 83
End: 2023-03-16
Payer: MEDICARE

## 2023-03-16 VITALS
BODY MASS INDEX: 25.18 KG/M2 | DIASTOLIC BLOOD PRESSURE: 70 MMHG | SYSTOLIC BLOOD PRESSURE: 124 MMHG | OXYGEN SATURATION: 97 % | HEIGHT: 69 IN | WEIGHT: 170 LBS | HEART RATE: 72 BPM

## 2023-03-16 DIAGNOSIS — Z95.2 S/P TAVR (TRANSCATHETER AORTIC VALVE REPLACEMENT): ICD-10-CM

## 2023-03-16 DIAGNOSIS — I35.0 AORTIC VALVE STENOSIS, NONRHEUMATIC: Primary | ICD-10-CM

## 2023-03-16 DIAGNOSIS — I10 ESSENTIAL HYPERTENSION: ICD-10-CM

## 2023-03-16 DIAGNOSIS — I25.10 CORONARY ARTERY DISEASE DUE TO LIPID RICH PLAQUE: ICD-10-CM

## 2023-03-16 DIAGNOSIS — I25.83 CORONARY ARTERY DISEASE DUE TO LIPID RICH PLAQUE: ICD-10-CM

## 2023-03-16 DIAGNOSIS — I48.0 PAF (PAROXYSMAL ATRIAL FIBRILLATION) (HCC): ICD-10-CM

## 2023-03-16 DIAGNOSIS — E78.00 HYPERCHOLESTEROLEMIA: ICD-10-CM

## 2023-03-16 PROCEDURE — 1123F ACP DISCUSS/DSCN MKR DOCD: CPT | Performed by: INTERNAL MEDICINE

## 2023-03-16 PROCEDURE — G8427 DOCREV CUR MEDS BY ELIG CLIN: HCPCS | Performed by: INTERNAL MEDICINE

## 2023-03-16 PROCEDURE — G8417 CALC BMI ABV UP PARAM F/U: HCPCS | Performed by: INTERNAL MEDICINE

## 2023-03-16 PROCEDURE — 3074F SYST BP LT 130 MM HG: CPT | Performed by: INTERNAL MEDICINE

## 2023-03-16 PROCEDURE — 1036F TOBACCO NON-USER: CPT | Performed by: INTERNAL MEDICINE

## 2023-03-16 PROCEDURE — G8484 FLU IMMUNIZE NO ADMIN: HCPCS | Performed by: INTERNAL MEDICINE

## 2023-03-16 PROCEDURE — 3078F DIAST BP <80 MM HG: CPT | Performed by: INTERNAL MEDICINE

## 2023-03-16 PROCEDURE — 99214 OFFICE O/P EST MOD 30 MIN: CPT | Performed by: INTERNAL MEDICINE

## 2023-03-16 RX ORDER — ASPIRIN 81 MG/1
81 TABLET ORAL DAILY
Qty: 90 TABLET | Refills: 2 | Status: SHIPPED | OUTPATIENT
Start: 2023-03-16

## 2023-03-16 RX ORDER — AMOXICILLIN 500 MG/1
CAPSULE ORAL
Qty: 4 CAPSULE | Refills: 3 | Status: SHIPPED | OUTPATIENT
Start: 2023-03-16

## 2023-03-17 NOTE — PATIENT INSTRUCTIONS

## 2023-03-21 ENCOUNTER — OFFICE VISIT (OUTPATIENT)
Dept: FAMILY MEDICINE CLINIC | Age: 83
End: 2023-03-21
Payer: MEDICARE

## 2023-03-21 ENCOUNTER — TELEPHONE (OUTPATIENT)
Dept: CARDIOLOGY CLINIC | Age: 83
End: 2023-03-21

## 2023-03-21 VITALS
HEART RATE: 68 BPM | DIASTOLIC BLOOD PRESSURE: 68 MMHG | HEIGHT: 69 IN | SYSTOLIC BLOOD PRESSURE: 102 MMHG | BODY MASS INDEX: 25.33 KG/M2 | OXYGEN SATURATION: 98 % | WEIGHT: 171 LBS

## 2023-03-21 DIAGNOSIS — I48.0 PAF (PAROXYSMAL ATRIAL FIBRILLATION) (HCC): ICD-10-CM

## 2023-03-21 DIAGNOSIS — I50.22 CHRONIC SYSTOLIC (CONGESTIVE) HEART FAILURE (HCC): ICD-10-CM

## 2023-03-21 DIAGNOSIS — E11.65 TYPE 2 DIABETES MELLITUS WITH HYPERGLYCEMIA, UNSPECIFIED WHETHER LONG TERM INSULIN USE (HCC): ICD-10-CM

## 2023-03-21 DIAGNOSIS — H25.013 CORTICAL AGE-RELATED CATARACT OF BOTH EYES: Primary | ICD-10-CM

## 2023-03-21 DIAGNOSIS — I35.0 AORTIC STENOSIS, SEVERE: ICD-10-CM

## 2023-03-21 DIAGNOSIS — I10 ESSENTIAL HYPERTENSION: ICD-10-CM

## 2023-03-21 DIAGNOSIS — E78.00 HYPERCHOLESTEREMIA: ICD-10-CM

## 2023-03-21 LAB — HBA1C MFR BLD: 7.1 %

## 2023-03-21 PROCEDURE — 1036F TOBACCO NON-USER: CPT | Performed by: FAMILY MEDICINE

## 2023-03-21 PROCEDURE — G8484 FLU IMMUNIZE NO ADMIN: HCPCS | Performed by: FAMILY MEDICINE

## 2023-03-21 PROCEDURE — G8427 DOCREV CUR MEDS BY ELIG CLIN: HCPCS | Performed by: FAMILY MEDICINE

## 2023-03-21 PROCEDURE — 3074F SYST BP LT 130 MM HG: CPT | Performed by: FAMILY MEDICINE

## 2023-03-21 PROCEDURE — 83036 HEMOGLOBIN GLYCOSYLATED A1C: CPT | Performed by: FAMILY MEDICINE

## 2023-03-21 PROCEDURE — 99214 OFFICE O/P EST MOD 30 MIN: CPT | Performed by: FAMILY MEDICINE

## 2023-03-21 PROCEDURE — G8417 CALC BMI ABV UP PARAM F/U: HCPCS | Performed by: FAMILY MEDICINE

## 2023-03-21 PROCEDURE — 1123F ACP DISCUSS/DSCN MKR DOCD: CPT | Performed by: FAMILY MEDICINE

## 2023-03-21 PROCEDURE — 3078F DIAST BP <80 MM HG: CPT | Performed by: FAMILY MEDICINE

## 2023-03-21 RX ORDER — DILTIAZEM HYDROCHLORIDE 240 MG/1
240 CAPSULE, COATED, EXTENDED RELEASE ORAL DAILY
Qty: 90 CAPSULE | Refills: 3 | Status: SHIPPED | OUTPATIENT
Start: 2023-03-21 | End: 2023-06-19

## 2023-03-21 NOTE — TELEPHONE ENCOUNTER
Anam Hyde is requesting OV notes from 3/16 apt and the latest EKG Tracing. Please fax to [phone]. Please advise.

## 2023-03-21 NOTE — PROGRESS NOTES
off plavix -on asa  Cont lipitor  Soliqua for dm control -much improved from 14 to 7.1%  Diet/ activity d/w pt  Routine eye check/ feet care encouraged  Clear/ low risk w/ present bs control for cataract surgery    Pre-Operative Risk assessment using 2014 ACC/AHA guidelines     Emergent procedure No  Active Cardiac Condition No (decompensated HF, Arrhythmia, MI <3 weeks, severe valve disease)  Risk Level of Procedure Low Risk (endoscopy, superficial skin, breast, ambulatory, or cataract, etc.)  Revised Cardiac Risk Index Risk factors: History of heart failure  Diabetic treated with insulin  Measurement of Exercise Tolerance before Surgery >4 Yes    According to the 2014 ACC/AHA pre-operative risk assessment guidelines Catarchito Bruce is a low risk for major cardiac complications during a low risk procedure and may continue as planned. Specific medication recommendations are listed below. Medications recommended to continue should be taken with a sip of water even when NPO.      Further recommendations from consultants: cleared by cardio 3/16  Abx prior to derm/ dental procedure    Medication Recommendations:  D/w eye doctor holding eliquis/ asa combination 2 days prior  O/w hold vitamins/ supplements 1 week prior to surgery    F/u 3 months for Tamika Rivera MD, MD  3/21/2023  10:30 AM

## 2023-03-22 RX ORDER — APIXABAN 5 MG/1
TABLET, FILM COATED ORAL
Qty: 60 TABLET | OUTPATIENT
Start: 2023-03-22

## 2023-05-01 ENCOUNTER — PATIENT MESSAGE (OUTPATIENT)
Dept: FAMILY MEDICINE CLINIC | Age: 83
End: 2023-05-01

## 2023-05-01 NOTE — TELEPHONE ENCOUNTER
From: Juno Griggs  To: Dr. Castillo Head: 5/1/2023 1:45 PM EDT  Subject: Question re: 5/4 appointment    I have an appointment on   Thurs. 5/4 for removal and biopsy of a lesion on my face. I'm checking to see if Lidocaine is still available. If not, I was told by Dr. Varun Rodriguez to hold 2 doses of Eloquis prior to the procedure, if necessary. Please let me know.  Deanna Marshall

## 2023-05-04 ENCOUNTER — PROCEDURE VISIT (OUTPATIENT)
Dept: FAMILY MEDICINE CLINIC | Age: 83
End: 2023-05-04

## 2023-05-04 VITALS — BODY MASS INDEX: 25.25 KG/M2 | DIASTOLIC BLOOD PRESSURE: 76 MMHG | HEIGHT: 69 IN | SYSTOLIC BLOOD PRESSURE: 120 MMHG

## 2023-05-04 DIAGNOSIS — L98.9 SKIN LESION OF FACE: Primary | ICD-10-CM

## 2023-05-04 NOTE — PROGRESS NOTES
Subjective:      Patient ID: Azeb Baca is a [de-identified] y.o. male. HPI  Hx of skin cancer w/ moh's procedure in past by dr. Kurt Mars for forehead lesion  Spot on cheek for some time - scabbing and not getting better - unable to get biopsy done for some time 2/2 shortage of lido w/ epi  Plan to do 3mm punch bx to determine if cancer as patient did not stop eliquis/ asa - pt aware will need to stop prior to anything additional    Review of Systems    Objective:   Physical Exam        Assessment / Plan:          Diagnosis Orders   1. Skin lesion of face  Surgical Pathology         Risks of procedure including healing delay, bleed, scar, pain, allergic rxn d/w pt   Betadine prep then 1 cc 2% lidocaine w/ epi local.  3mm punch done at border of lesion. Specimen excised and sent to path  Silver nitrate/ pressure for hemostasis  Bacitracin/ bandaid applied  Wound care dw/ pt   F/u pending path / sooner if problems/ sx of infection.

## 2023-06-09 RX ORDER — ATORVASTATIN CALCIUM 80 MG/1
TABLET, FILM COATED ORAL
Qty: 90 TABLET | Refills: 0 | Status: SHIPPED | OUTPATIENT
Start: 2023-06-09

## 2023-06-19 PROBLEM — I44.7 LBBB (LEFT BUNDLE BRANCH BLOCK): Status: ACTIVE | Noted: 2023-06-19

## 2023-06-20 SDOH — HEALTH STABILITY: PHYSICAL HEALTH: ON AVERAGE, HOW MANY MINUTES DO YOU ENGAGE IN EXERCISE AT THIS LEVEL?: 30 MIN

## 2023-06-20 SDOH — ECONOMIC STABILITY: FOOD INSECURITY: WITHIN THE PAST 12 MONTHS, YOU WORRIED THAT YOUR FOOD WOULD RUN OUT BEFORE YOU GOT MONEY TO BUY MORE.: NEVER TRUE

## 2023-06-20 SDOH — ECONOMIC STABILITY: HOUSING INSECURITY
IN THE LAST 12 MONTHS, WAS THERE A TIME WHEN YOU DID NOT HAVE A STEADY PLACE TO SLEEP OR SLEPT IN A SHELTER (INCLUDING NOW)?: NO

## 2023-06-20 SDOH — ECONOMIC STABILITY: INCOME INSECURITY: HOW HARD IS IT FOR YOU TO PAY FOR THE VERY BASICS LIKE FOOD, HOUSING, MEDICAL CARE, AND HEATING?: NOT HARD AT ALL

## 2023-06-20 SDOH — HEALTH STABILITY: PHYSICAL HEALTH: ON AVERAGE, HOW MANY DAYS PER WEEK DO YOU ENGAGE IN MODERATE TO STRENUOUS EXERCISE (LIKE A BRISK WALK)?: 2 DAYS

## 2023-06-20 SDOH — ECONOMIC STABILITY: FOOD INSECURITY: WITHIN THE PAST 12 MONTHS, THE FOOD YOU BOUGHT JUST DIDN'T LAST AND YOU DIDN'T HAVE MONEY TO GET MORE.: NEVER TRUE

## 2023-06-20 ASSESSMENT — PATIENT HEALTH QUESTIONNAIRE - PHQ9
SUM OF ALL RESPONSES TO PHQ QUESTIONS 1-9: 0
2. FEELING DOWN, DEPRESSED OR HOPELESS: 0
1. LITTLE INTEREST OR PLEASURE IN DOING THINGS: 0
SUM OF ALL RESPONSES TO PHQ9 QUESTIONS 1 & 2: 0
SUM OF ALL RESPONSES TO PHQ QUESTIONS 1-9: 0

## 2023-06-20 ASSESSMENT — LIFESTYLE VARIABLES
HOW OFTEN DO YOU HAVE A DRINK CONTAINING ALCOHOL: 2-4 TIMES A MONTH
HOW MANY STANDARD DRINKS CONTAINING ALCOHOL DO YOU HAVE ON A TYPICAL DAY: 1 OR 2
HOW OFTEN DO YOU HAVE SIX OR MORE DRINKS ON ONE OCCASION: 1
HOW OFTEN DO YOU HAVE A DRINK CONTAINING ALCOHOL: 3
HOW MANY STANDARD DRINKS CONTAINING ALCOHOL DO YOU HAVE ON A TYPICAL DAY: 1

## 2023-06-23 ENCOUNTER — OFFICE VISIT (OUTPATIENT)
Dept: FAMILY MEDICINE CLINIC | Age: 83
End: 2023-06-23

## 2023-06-23 VITALS
SYSTOLIC BLOOD PRESSURE: 120 MMHG | WEIGHT: 170 LBS | DIASTOLIC BLOOD PRESSURE: 78 MMHG | BODY MASS INDEX: 25.18 KG/M2 | HEART RATE: 60 BPM | OXYGEN SATURATION: 99 % | HEIGHT: 69 IN

## 2023-06-23 DIAGNOSIS — I25.84 CORONARY ARTERY DISEASE DUE TO CALCIFIED CORONARY LESION: ICD-10-CM

## 2023-06-23 DIAGNOSIS — I35.0 AORTIC STENOSIS, SEVERE: ICD-10-CM

## 2023-06-23 DIAGNOSIS — H90.3 SENSORINEURAL HEARING LOSS, BILATERAL: ICD-10-CM

## 2023-06-23 DIAGNOSIS — E78.2 MIXED HYPERLIPIDEMIA: ICD-10-CM

## 2023-06-23 DIAGNOSIS — Z91.09 ENVIRONMENTAL ALLERGIES: ICD-10-CM

## 2023-06-23 DIAGNOSIS — I25.10 CORONARY ARTERY DISEASE DUE TO CALCIFIED CORONARY LESION: ICD-10-CM

## 2023-06-23 DIAGNOSIS — D68.69 SECONDARY HYPERCOAGULABLE STATE (HCC): ICD-10-CM

## 2023-06-23 DIAGNOSIS — I48.0 PAF (PAROXYSMAL ATRIAL FIBRILLATION) (HCC): ICD-10-CM

## 2023-06-23 DIAGNOSIS — Z00.00 MEDICARE ANNUAL WELLNESS VISIT, SUBSEQUENT: ICD-10-CM

## 2023-06-23 DIAGNOSIS — E55.9 VITAMIN D DEFICIENCY: ICD-10-CM

## 2023-06-23 DIAGNOSIS — E11.65 TYPE 2 DIABETES MELLITUS WITH HYPERGLYCEMIA, UNSPECIFIED WHETHER LONG TERM INSULIN USE (HCC): Primary | ICD-10-CM

## 2023-06-23 DIAGNOSIS — E78.00 HYPERCHOLESTEREMIA: ICD-10-CM

## 2023-06-23 DIAGNOSIS — H25.012 CORTICAL AGE-RELATED CATARACT OF LEFT EYE: ICD-10-CM

## 2023-06-23 DIAGNOSIS — E87.1 HYPONATREMIA: ICD-10-CM

## 2023-06-23 DIAGNOSIS — M72.0 DUPUYTREN'S CONTRACTURE: ICD-10-CM

## 2023-06-23 DIAGNOSIS — I50.22 CHRONIC SYSTOLIC (CONGESTIVE) HEART FAILURE (HCC): ICD-10-CM

## 2023-06-23 DIAGNOSIS — Z85.46 HISTORY OF PROSTATE CANCER: ICD-10-CM

## 2023-06-23 PROBLEM — R73.03 PREDIABETES: Status: RESOLVED | Noted: 2019-03-21 | Resolved: 2023-06-23

## 2023-06-23 LAB — HBA1C MFR BLD: 5.6 %

## 2023-06-23 RX ORDER — DULAGLUTIDE 1.5 MG/.5ML
1.5 INJECTION, SOLUTION SUBCUTANEOUS WEEKLY
Qty: 2 ML | Refills: 5 | Status: SHIPPED | OUTPATIENT
Start: 2023-06-23

## 2023-06-23 NOTE — PATIENT INSTRUCTIONS
Preventing Falls: Care Instructions    Talk to your doctor about the medicines you take. Ask if any of them increase the risk of falls and whether they can be changed or stopped. Try to exercise regularly. It can help improve your strength and balance. This can help lower your risk of falling. Practice fall safety and prevention. Wear low-heeled shoes that fit well and give your feet good support. Talk to your doctor if you have foot problems that make this hard. Carry a cellphone or wear a medical alert device that you can use to call for help. Use stepladders instead of chairs to reach high objects. Don't climb if you're at risk for falls. Ask for help, if needed. Wear the correct eyeglasses, if you need them. Make your home safer. Remove rugs, cords, clutter, and furniture from walkways. Keep your house well lit. Use night-lights in hallways and bathrooms. Install and use sturdy handrails on stairways. Wear nonskid footwear, even inside. Don't walk barefoot or in socks without shoes. Be safe outside. Use handrails, curb cuts, and ramps whenever possible. Keep your hands free by using a shoulder bag or backpack. Try to walk in well-lit areas. Watch out for uneven ground, changes in pavement, and debris. Be careful in the winter. Walk on the grass or gravel when sidewalks are slippery. Use de-icer on steps and walkways. Add non-slip devices to shoes. Put grab bars and nonskid mats in your shower or tub and near the toilet. Try to use a shower chair or bath bench when bathing. Get into a tub or shower by putting in your weaker leg first. Get out with your strong side first. Have a phone or medical alert device in the bathroom with you. Where can you learn more? Go to http://www.mejía.com/ and enter G117 to learn more about \"Preventing Falls: Care Instructions. \"  Current as of: November 9, 2022               Content Version: 13.7  © 7155-6428 Healthwise

## 2023-06-23 NOTE — PROGRESS NOTES
atraumatic  Neck: supple and non-tender without mass, no thyromegaly or thyroid nodules, no cervical lymphadenopathy  Pulmonary/Chest: clear to auscultation bilaterally- no wheezes, rales or rhonchi, normal air movement, no respiratory distress  Cardiovascular: normal rate, regular rhythm, normal S1 and S2, + murmurs, rubs, clicks, or gallops, distal pulses intact, no carotid bruits  Abdomen: soft, non-tender, non-distended, normal bowel sounds, no masses or organomegaly  Extremities: no cyanosis, clubbing or edema  Musculoskeletal: normal range of motion, no joint swelling, deformity or tenderness       No Known Allergies  Prior to Visit Medications    Medication Sig Taking? Authorizing Provider   atorvastatin (LIPITOR) 80 MG tablet TAKE 1 TABLET EVERY NIGHT Yes Bashir Vo MD   Insulin Glargine-Lixisenatide 100-33 UNT-MCG/ML SOPN 30-40 units sc daily Yes Bashir Vo MD   Insulin Pen Needle 32G X 4 MM MISC 1 each by Does not apply route daily Yes Bashir Vo MD   apixaban (ELIQUIS) 5 MG TABS tablet Take 1 tablet by mouth 2 times daily Yes Bashir Vo MD   aspirin EC 81 MG EC tablet Take 1 tablet by mouth daily Yes Fabiana Rojas MD   lisinopril-hydroCHLOROthiazide (PRINZIDE;ZESTORETIC) 20-12.5 MG per tablet TAKE ONE TABLET BY MOUTH DAILY Yes Bashir Vo MD   glucose monitoring (FREESTYLE FREEDOM) kit 1 kit by Does not apply route daily Yes Bashir Vo MD   blood glucose monitor strips Test 1 times a day & as needed for symptoms of irregular blood glucose. Dispense sufficient amount for indicated testing frequency plus additional to accommodate PRN testing needs.  Yes Bashir Vo MD   Lancets MISC 1 each by Does not apply route daily Use 1-3x/ day as directed Yes Bashir Vo MD   Multiple Vitamins-Minerals (PRESERVISION AREDS PO) Take 1 tablet by mouth daily Yes Historical Provider, MD   latanoprost (XALATAN) 0.005 % ophthalmic solution Place 1 drop into both eyes nightly Yes Jeffry Ocasio MD

## 2023-07-10 ENCOUNTER — PROCEDURE VISIT (OUTPATIENT)
Dept: SURGERY | Age: 83
End: 2023-07-10
Payer: MEDICARE

## 2023-07-10 VITALS — HEART RATE: 58 BPM | SYSTOLIC BLOOD PRESSURE: 142 MMHG | DIASTOLIC BLOOD PRESSURE: 76 MMHG

## 2023-07-10 DIAGNOSIS — C44.319 BASAL CELL CARCINOMA OF RIGHT CHEEK: Primary | ICD-10-CM

## 2023-07-10 PROCEDURE — 17312 MOHS ADDL STAGE: CPT | Performed by: DERMATOLOGY

## 2023-07-10 PROCEDURE — 17311 MOHS 1 STAGE H/N/HF/G: CPT | Performed by: DERMATOLOGY

## 2023-07-10 PROCEDURE — 12053 INTMD RPR FACE/MM 5.1-7.5 CM: CPT | Performed by: DERMATOLOGY

## 2023-07-10 NOTE — PROGRESS NOTES
PRE-PROCEDURE SCREENING    Pacemaker/ICD: No  Difficulty with numbing in the past: No  Local Anesthesia Reaction/passing out: No  Latex or adhesive allergy:  No  Bleeding/Clotting Disorders: No  Anticoagulant Therapy: Yes, eliquis and asa 81 mg - valve replaced  Joint prosthesis: No  Artificial Heart Valve: Yes, aortic valve  Stroke or Seizures: No  Organ Transplant or Lymphoma: No  Immunosuppression: No  Respiratory Problems: No

## 2023-07-10 NOTE — PROGRESS NOTES
MOHS PROCEDURE NOTE    PHYSICIAN:  Daniel Corrigan. Alex Ward MD, Who operated in two distinct and integrated capacities as the surgeon removing the tissue and as the pathologist examining the tissue. ASSISTANT: Ada Nixon RN, Brii Mosley, RN, Vinicio Mahajan RN     REFERRING PROVIDER:  Manuel Brandon    PREOPERATIVE DIAGNOSIS: Nodular Basal Cell Carcinoma     SPECIFIC MOHS INDICATIONS:  size, location, and need for tissue conservation    AUC SCORIN    POSTOPERATIVE DIAGNOSIS: SAME    LOCATION:Face (right cheek)    OPERATIVE PROCEDURE:  MOHS MICROGRAPHIC SURGERY    RECONSTRUCTION OF DEFECT: Intermediate layered closure    PREOPERATIVE SIZE: 14x10 MM    DEFECT SIZE: 26x17 MM    LENGTH OF REPAIRED WOUND/SIZE OF FLAP/SIZE OF GRAFT:  13 MM    ANESTHESIA:  13mL 1% lidocaine with epinephrine 1:100,000 buffered. EBL:  MINIMAL    DURATION OF PROCEDURE:  2 HOURS 15 MINUTES    POSTOPERATIVE OBSERVATION: 1 HOUR    SPECIMENS:  SEE MOHS MAP    COMPLICATIONS:  NONE    DESCRIPTION OF PROCEDURE:  The patient was given a mirror, as appropriate, and the biopsy site was identified, marked with a surgical marking pen, and verified by the patient. Options for treatment were discussed and the patient was informed that Mohs surgery was the selected treatment based on its lower recurrence rate, given the features listed above, as compared to other treatment modalities such as excision, radiation, or curettage, and agreed with this treatment plan. Risks and benefits including bruising, swelling, bleeding, infection, nerve injury, recurrence, and scarring were discussed with the patient prior to the procedure and a written consent detailing these and other risks was reviewed with the patient and signed. There was a time out for person and procedure verification. The surgical site was prepped with an antiseptic solution. Application of an antiseptic solution was repeated before each surgical stage.       Stage I:  The

## 2023-07-11 ENCOUNTER — TELEPHONE (OUTPATIENT)
Dept: ADMINISTRATIVE | Age: 83
End: 2023-07-11

## 2023-07-11 ENCOUNTER — TELEPHONE (OUTPATIENT)
Dept: SURGERY | Age: 83
End: 2023-07-11

## 2023-07-11 NOTE — TELEPHONE ENCOUNTER
THE plan is asking if the patient is going to be on Trulicity and Soliqua  at the same time? Please advise. If this requires a response please respond to the pool. [facility] [place]). thank you.

## 2023-07-11 NOTE — TELEPHONE ENCOUNTER
The patient was in the office on 7/10/23 for mohs located on the face( right cheek) with [place] repair. The patient tolerated the procedure well and left the office in good condition. Pain level on post-operative day 1:  Mild, did not need to take anything    Any bleeding episode that required pressure to be held, bandage change or a call to the office or MD?  no     Any other issues?:  no    A post-operative telephone call was placed at 4:25pm in order to check on the patient's recovery process. The patient reported doing well and had no complaints other than those listed above, if any. All of the patient's questions were answered.

## 2023-07-12 NOTE — TELEPHONE ENCOUNTER
Submitted PA for [place]  Via Count includes the [facility]  Key: [id number] STATUS:  [address] [place] 12/31/2023. Follow up done daily; if no response in three days we will refax for status check. If another three days goes by with no response we will call the insurance for status. If this requires a response please respond to the pool. [facility] [place]). Please advise patient thank you.

## 2023-07-18 ENCOUNTER — OFFICE VISIT (OUTPATIENT)
Dept: CARDIOLOGY CLINIC | Age: 83
End: 2023-07-18
Payer: MEDICARE

## 2023-07-18 ENCOUNTER — HOSPITAL ENCOUNTER (OUTPATIENT)
Dept: NON INVASIVE DIAGNOSTICS | Age: 83
Discharge: HOME | End: 2023-07-18
Payer: MEDICARE

## 2023-07-18 VITALS
OXYGEN SATURATION: 96 % | HEART RATE: 64 BPM | WEIGHT: 168.6 LBS | SYSTOLIC BLOOD PRESSURE: 114 MMHG | HEIGHT: 69 IN | BODY MASS INDEX: 24.97 KG/M2 | DIASTOLIC BLOOD PRESSURE: 64 MMHG

## 2023-07-18 DIAGNOSIS — Z95.2 S/P TAVR (TRANSCATHETER AORTIC VALVE REPLACEMENT): ICD-10-CM

## 2023-07-18 DIAGNOSIS — I48.0 PAF (PAROXYSMAL ATRIAL FIBRILLATION) (HCC): Primary | ICD-10-CM

## 2023-07-18 DIAGNOSIS — I49.3 PVC (PREMATURE VENTRICULAR CONTRACTION): ICD-10-CM

## 2023-07-18 DIAGNOSIS — I35.0 AORTIC VALVE STENOSIS, NONRHEUMATIC: ICD-10-CM

## 2023-07-18 DIAGNOSIS — I35.0 AORTIC STENOSIS, SEVERE: ICD-10-CM

## 2023-07-18 DIAGNOSIS — I10 ESSENTIAL HYPERTENSION: ICD-10-CM

## 2023-07-18 DIAGNOSIS — I44.7 LBBB (LEFT BUNDLE BRANCH BLOCK): ICD-10-CM

## 2023-07-18 DIAGNOSIS — I50.22 CHRONIC SYSTOLIC (CONGESTIVE) HEART FAILURE (HCC): ICD-10-CM

## 2023-07-18 LAB
LV EF: 58 %
LVEF MODALITY: NORMAL

## 2023-07-18 PROCEDURE — G8420 CALC BMI NORM PARAMETERS: HCPCS | Performed by: NURSE PRACTITIONER

## 2023-07-18 PROCEDURE — 99214 OFFICE O/P EST MOD 30 MIN: CPT | Performed by: NURSE PRACTITIONER

## 2023-07-18 PROCEDURE — 3074F SYST BP LT 130 MM HG: CPT | Performed by: NURSE PRACTITIONER

## 2023-07-18 PROCEDURE — G8427 DOCREV CUR MEDS BY ELIG CLIN: HCPCS | Performed by: NURSE PRACTITIONER

## 2023-07-18 PROCEDURE — 1123F ACP DISCUSS/DSCN MKR DOCD: CPT | Performed by: NURSE PRACTITIONER

## 2023-07-18 PROCEDURE — 3078F DIAST BP <80 MM HG: CPT | Performed by: NURSE PRACTITIONER

## 2023-07-18 PROCEDURE — 1036F TOBACCO NON-USER: CPT | Performed by: NURSE PRACTITIONER

## 2023-07-18 PROCEDURE — 93306 TTE W/DOPPLER COMPLETE: CPT

## 2023-07-18 PROCEDURE — 93000 ELECTROCARDIOGRAM COMPLETE: CPT | Performed by: NURSE PRACTITIONER

## 2023-08-31 RX ORDER — BLOOD-GLUCOSE METER
EACH MISCELLANEOUS
Qty: 100 STRIP | Refills: 3 | Status: SHIPPED | OUTPATIENT
Start: 2023-08-31

## 2023-09-22 ENCOUNTER — TELEPHONE (OUTPATIENT)
Dept: FAMILY MEDICINE CLINIC | Age: 83
End: 2023-09-22

## 2023-09-22 NOTE — TELEPHONE ENCOUNTER
----- Message from Guanakito Turcios sent at 9/22/2023  2:53 PM EDT -----  Subject: Appointment Request    Reason for Call: Established Patient Appointment needed: Flu Shot    QUESTIONS    Reason for appointment request? No appointments available during search     Additional Information for Provider?  Pt is requesting to schedule a Flu   shot.  ---------------------------------------------------------------------------  --------------  Rigo LANE  [phone]; OK to leave message on voicemail  ---------------------------------------------------------------------------  --------------  SCRIPT ANSWERS

## 2023-09-26 ENCOUNTER — NURSE ONLY (OUTPATIENT)
Dept: FAMILY MEDICINE CLINIC | Age: 83
End: 2023-09-26
Payer: MEDICARE

## 2023-09-26 DIAGNOSIS — Z23 NEED FOR IMMUNIZATION AGAINST INFLUENZA: Primary | ICD-10-CM

## 2023-09-26 PROCEDURE — 90694 VACC AIIV4 NO PRSRV 0.5ML IM: CPT | Performed by: NURSE PRACTITIONER

## 2023-09-26 PROCEDURE — G0008 ADMIN INFLUENZA VIRUS VAC: HCPCS | Performed by: NURSE PRACTITIONER

## 2023-10-02 RX ORDER — LISINOPRIL AND HYDROCHLOROTHIAZIDE 20; 12.5 MG/1; MG/1
TABLET ORAL
Qty: 90 TABLET | Refills: 0 | Status: SHIPPED | OUTPATIENT
Start: 2023-10-02

## 2023-11-27 RX ORDER — LANCETS 30 GAUGE
1 EACH MISCELLANEOUS DAILY
Qty: 100 EACH | Refills: 3 | Status: CANCELLED | OUTPATIENT
Start: 2023-11-27

## 2023-11-28 ENCOUNTER — TELEPHONE (OUTPATIENT)
Dept: FAMILY MEDICINE CLINIC | Age: 83
End: 2023-11-28

## 2023-11-28 RX ORDER — ATORVASTATIN CALCIUM 80 MG/1
80 TABLET, FILM COATED ORAL NIGHTLY
Qty: 90 TABLET | Refills: 1 | Status: SHIPPED | OUTPATIENT
Start: 2023-11-28

## 2023-11-28 RX ORDER — LANCETS 30 GAUGE
1 EACH MISCELLANEOUS DAILY
Qty: 100 EACH | Refills: 3 | Status: SHIPPED | OUTPATIENT
Start: 2023-11-28

## 2024-01-05 NOTE — TELEPHONE ENCOUNTER
PHARMACY REQUESTING REFILL ON TRULICITY.  I SPOKE TO PT, HE WANTS TO WAIT UNTIL HIS APPT ON 1/19/2024 WITH ARSENIO DONALD CNP TO DISCUSS WHETHER HE WANTS TO CONTINUE THIS MEDICATION.  I WILL REFUSE REFILL FOR NOW. CHITRA

## 2024-01-05 NOTE — PROGRESS NOTES
[facility]  H+P  Consult  OP Visit  FU Visit   CC/HPI   CC Followup visit for cardiac conditions detailed in assessment and plan below.   Intervention PCI, TAVR   General Doing well.  No new concerns.     Cardiac Sx -CP, -SOB, -dizziness, -syncope, -edema, -orthopnea, -pnd, -fatigue   HISTORY/ALLERGY/ROS   M/S/S/F Hx Reviewed in Epic and updated as appropriate   ALLERG Patient has no known allergies.   ROS Full ROS obtained and negative except as mentioned in HPI   MEDICATIONS   Cardiac medications reviewed in Epic during visit.   PHYSICAL EXAM   Vitals /60 (Site: Left Upper Arm, Position: Sitting, Cuff Size: Medium Adult)   Pulse 83   Ht 1.753 m (5' 9.02\")   Wt 76.6 kg (168 lb 12.8 oz)   SpO2 98%   BMI 24.92 kg/m²    Gen Alert, coop, no distress Heart  RRR, no MRG   Lung CTA-B, unlabored, no DTP Extrem Edema -Grade 0 (0mm)      COMPLIANCE   Discussed and counseled on diet, exercise, weight loss, smoking, alcohol, drugs.  All questions answered.   CODING   SCI (39747) - 30-39 mins spent reviewing hx/tests/consults, performing exam, counseling/educating, ordering meds/tests/procedures, referring/communicating w/PCP/consultants, documenting in EMR, interpreting results, communicating medical information and plan with family.   SCRIBE ATTESTATION   Nurse I, Yesenia Langford, RN, am scribing for and in the presence of Antonio Bergeron MD. 1/5/2024   Doctor Yesenia Langford is working as scribe for and in presence of me and may have prepopulated components of note with my historical intellectual property (IP) under my supervision.  Any additions to this IP performed in my presence and at my direction. Content and accuracy of this note reviewed by me (Antonio Bergeron MD).   ASSESSMENT AND PLAN   *AS/CAD    Date EF Detail   Sx     As above   NYHA     II   Hx 8/22   TAVR 29 mm Tovar Ultra   TTE 4/22 8/22 9/22 7/23 60%  60%  60%  60% Severe AS MG 42, Mild to mod AI  TAVR MG 12  TAVR MG 12  TAVR MG 13, trivial

## 2024-01-10 NOTE — FLOWSHEET NOTE
DanyelagathaEliezer porter  Phone: [phone]   Fax: [phone]    Physical Therapy Treatment Note/ Progress Report:     Date:  2021    Patient Name:  Jenniffer Covergiorgio    :  1940  MRN: [id number]  Restrictions/Precautions:    Medical/Treatment Diagnosis Information:  Diagnosis: M25.562, G89.29 (ICD-10-CM) - Chronic pain of left knee  Treatment Diagnosis: dec L knee flexion ROM, (+) meniscus testing, impaired balance, dec B HS, quad, and calf flexibility  Insurance/Certification information:  PT Insurance Information: Medicare & AARP  Physician Information:  Referring Practitioner: JESSICA Grewal CNP  Plan of care signed (Y/N): [x]  Yes [x]  No     Date of Patient follow up with Physician: 6 months       Progress Report: [x]  Yes  []  No     Date Range for reporting period:  Beginnin/5   Endin/21    Progress report due (10 Rx/or 30 days whichever is less): visit #20 or  (date)     Recertification due (POC duration/ or 90 days whichever is less): visit #12 or  (date)     Visit # Insurance Allowable Auth required? Date Range    Med nec   Yes  [x]  No      Latex Allergy:  [x]NO      []YES  Preferred Language for Healthcare:   [x]English       []other:    Functional Scale:        Date assessed:  LEFS: raw score = 49; dysfunction = 38.75%  21    Pain level:  5/10     SUBJECTIVE:  Pt reports that his feet felt \"like they were inside out\" the day after last session. Warmed up by walking and the pain subsided. Felt like a good workout.       OBJECTIVE:   : Pt with lateral heel wear pattern on R shoe due to heel strike in R LE.      :             PROM AROM     L R L R   Knee Flexion  130   120    Knee Extension               Strength (0-5) / Myotomes Left Right   Hip Flexion - supine       Hip Flexion - seated (L1-2)       Hip Abduction 4-             Flexibility       Hamstrings (90/90) Limited 45 deg     ITB Nelly Potter, 8/28/1934, 89 year old, female    Chief Complaint:    Chief Complaint   Patient presents with    Follow - Up     RSV with cough, weakness, MM, hypoxia        Subjective:   TODAY:  Blanca is seen in her room today. Caregiver and son are present at the bedside.   Blanca is feeling improved, less shortness of breath. Her cough is dry now. Has not used oxygen in about 2 days now even at night.  Currently on Room air. No chest pain, no fevers.   DW patient, son and CG at bedside. AIDE nursing. AIDE Vyas at Her hematology office, they have all the labs and everything is looking good. She can complete rehab and return to their care after that. She is still very weak.     Denies insomnia, +fatigue, fever/chills, +cough, + SOB, dyspnea, angina, palpitations, n/v, diarrhea, constipation, and urinary sxs.      Objective:  BP (!) 150/94   Pulse 96   Temp 97.5 °F (36.4 °C)   Resp 16   Wt 156 lb 4.8 oz (70.9 kg)   SpO2 96%   BMI 30.53 kg/m²     PHYSICAL EXAM:  GENERAL HEALTH: frail elderly female, no acute distress  LINES, TUBES, DRAINS:  none  SKIN: no rashes, no suspicious lesions  WOUND: see wound assessment   EYES: PERRLA, conjunctiva normal; no drainage from eyes  HENT: normocephalic; normal nose, no nasal drainage, mucous membranes pink, moist, rounded face  RESPIRATORY: clear,  + dry cough, no crackles, no rhonchi, no accessory muscle use; on room air no dyspnea  NO cough with deep breathing on exam or with talking  CARDIOVASCULAR: S1, S2 normal, RRR; LLE with minimal edema much improved,+ DVT  ABDOMEN: normal active BS+, soft, non-distended; non tender  : Deferred  LYMPHATIC: no lymphedema  MUSCULOSKELETAL: no acute synovitis upper or lower extremity.    EXTREMITIES/VASCULAR: no cyanosis, clubbing, pulses intact BLE, BLE without edema  NEUROLOGIC:  alert and oriented x 2-3, intact; no sensorimotor deficit  PSYCHIATRIC:; affect appropriate, intermittently confused, calm    Therapy:  Not  ambulating  Max assist to dependent with transfers and ADLS  DC plan now to continue with long term care/respite on DC. Family working with social work.     Medications reviewed: Yes      Current Outpatient Medications:     ipratropium-albuterol 0.5-2.5 (3) MG/3ML Inhalation Solution, Take 3 mL by nebulization every 6 (six) hours while awake., Disp: , Rfl:     albuterol 108 (90 Base) MCG/ACT Inhalation Aero Soln, Inhale 2 puffs into the lungs every 4 (four) hours as needed for Wheezing., Disp: 1 each, Rfl: 0    ipratropium 0.03 % Nasal Solution, 2 sprays by Nasal route daily as needed for Rhinitis., Disp: , Rfl:     Vibegron 75 MG Oral Tab, Take 75 mg by mouth daily., Disp: , Rfl:     multivitamin Oral Chew Tab, Chew 1 tablet by mouth daily., Disp: , Rfl:     dexamethasone 2 MG Oral Tab, Take 1 tablet (2 mg total) by mouth daily with breakfast., Disp: , Rfl:     acidophilus-pectin Oral Cap, Take 1 capsule by mouth daily., Disp: , Rfl:     midodrine 5 MG Oral Tab, Take 1 tablet (5 mg total) by mouth every 8 (eight) hours as needed (SBP less than 95 and DBP less than 50)., Disp: , Rfl:     sennosides 8.6 MG Oral Tab, Take 2 tablets (17.2 mg total) by mouth at bedtime., Disp: , Rfl:     Solifenacin Succinate 10 MG Oral Tab, Take 1 tablet (10 mg total) by mouth daily., Disp: , Rfl:     polyethylene glycol, PEG 3350, 17 g Oral Powd Pack, Take 17 g by mouth daily as needed (constipation)., Disp: , Rfl:     acetaminophen 325 MG Oral Tab, Take 2 tablets (650 mg total) by mouth every 4 (four) hours as needed for Pain., Disp: , Rfl:     rivaroxaban (XARELTO) 20 MG Oral Tab, Take 1 tablet (20 mg total) by mouth daily with food., Disp: , Rfl:     sertraline 25 MG Oral Tab, Take 1 tablet (25 mg total) by mouth daily., Disp: , Rfl:     amLODIPine 2.5 MG Oral Tab, Take 1 tablet (2.5 mg total) by mouth daily., Disp: , Rfl:     gabapentin 300 MG Oral Cap, Take 1 capsule (300 mg total) by mouth 3 (three) times daily. (Patient taking  differently: Take 1 capsule (300 mg total) by mouth in the morning and 1 capsule (300 mg total) before bedtime.), Disp: 270 capsule, Rfl: 1    fexofenadine 180 MG Oral Tab, Take 1 tablet (180 mg total) by mouth daily as needed., Disp: 90 tablet, Rfl: 3    lansoprazole 30 MG Oral Capsule Delayed Release, Take 1 capsule (30 mg total) by mouth daily. Before meal, Disp: 90 capsule, Rfl: 3    atorvastatin 10 MG Oral Tab, Take 1 tablet (10 mg total) by mouth daily., Disp: 90 tablet, Rfl: 3    Docusate Sodium 100 MG Oral Cap, Take 1 capsule (100 mg total) by mouth daily as needed for constipation., Disp: , Rfl:       Diagnostics reviewed:    Lab Results   Component Value Date    WBC 6.3 01/05/2024    RBC 3.94 01/05/2024    HGB 9.6 (L) 01/05/2024    HCT 30.9 (L) 01/05/2024    MCV 78.4 (L) 01/05/2024    MCH 24.4 (L) 01/05/2024    MCHC 31.1 01/05/2024    RDW 22.5 01/05/2024    .0 01/05/2024     Lab Results   Component Value Date    GLU 95 01/05/2024    BUN 17 01/05/2024    BUNCREA 17.0 10/08/2021    CREATSERUM 0.60 01/05/2024    ANIONGAP 6 01/05/2024    CA 8.2 (L) 01/05/2024    OSMOCALC 285 01/05/2024    ALKPHO 74 01/02/2024    AST <3 (L) 01/02/2024    ALT 33 01/02/2024    BILT 0.4 01/02/2024    TP 5.2 (L) 01/02/2024    TP 4.9 (L) 01/02/2024    ALB 2.5 (L) 01/02/2024    ALB 2.71 (L) 01/02/2024    GLOBULIN 2.7 (L) 01/02/2024    AGRATIO 2.1 09/11/2013     01/05/2024    K 3.8 01/05/2024     01/05/2024    CO2 29.0 01/05/2024       Assessment and plan:    RSV  Isolation Droplet completed 1/5/2024  Robitussin prn  Duonebs Q6 prn  Monitoring VS q shift and prn  Cough dry, with weakness, fatigue  Oxygen weaned now RA for last  2 days  Supportive care, rest, fluids, simple meals  Labs monitoring  DW ID NP    Physical Deconditioning/Impaired mobility and ADLs/At risk for falling  Fall Precautions  PT/OT/ST to evaluate and treat  DOLLY team to establish care plan meeting with patient and POA/family as  for rehab  -all pt questions were answered    5/27:  Bridge with band, excursions (lunes/taps), STS with band around knees, standing hip abduction with purple band. 4/5:  Access Code: [email or web address]  URL: [email or web address]  Date: 04/05/2021  Prepared by: Tenzin Barrera    Exercises  Seated Table Hamstring Stretch - 3 x daily - 7 x weekly - 1 sets - 2 reps - 30 hold  Supine Quad Set - 1 x daily - 7 x weekly - 1 sets - 10 reps - 10 hold  Supine Bridge - 1 x daily - 7 x weekly - 10 reps - 3 sets  Supine Active Straight Leg Raise - 1 x daily - 7 x weekly - 10 reps - 3 sets  Sidelying Hip Abduction - 1 x daily - 7 x weekly - 10 reps - 3 sets  Seated Long Arc Quad - 1 x daily - 7 x weekly - 10 reps - 3 sets    Patient Education  Meniscus Tear    Therapeutic Exercise and NMR EXR  [x] (55902) Provided verbal/tactile cueing for activities related to strengthening, flexibility, endurance, ROM for improvements in LE, proximal hip, and core control with self care, mobility, lifting, ambulation.  [] (50918) Provided verbal/tactile cueing for activities related to improving balance, coordination, kinesthetic sense, posture, motor skill, proprioception  to assist with LE, proximal hip, and core control in self care, mobility, lifting, ambulation and eccentric single leg control.   [] (09520) Therapist is in constant attendance of 2 or more patients providing skilled therapy interventions, but not providing any significant amount of measurable one-on-one time to either patient, for improvements in LE, proximal hip, and core control in self care, mobility, lifting, ambulation and eccentric single leg control.      NMR and Therapeutic Activities:    [] (04788 or 32162) Provided verbal/tactile cueing for activities related to improving balance, coordination, kinesthetic sense, posture, motor skill, proprioception and motor activation to allow for proper function of core, proximal hip and LE with self care and ADLs  [] (12838) Gait appropriate  Anticipate DC on or before 1/16/24; SW to assist patient/family w/ DC planning  DC Plan:  long term care on DC now, SW working with family may stay respite until placement     Hx Frequent UTI/ retention    Treated in hospital with IV Zosyn. Urology consulted in hospital Ecoli ESBL , ertapenem here completed  Monitor symptoms   Solifenacin succinate 10 mg daily   Vibegron 75 mg daily   Labs  CBC, CMP stable     PE/ BLE DVT/ A fib   Monitor symptoms + SOB and fatigue  Vital signs q shift and prn   Labs weekly and prn   Xarelto 20 mg daily  DW daughter sx, treatment and follow up for PE  Also noted US + DVT BLE on 12/2/23 no  compression socks, elevate for edema only        HTN/HL/CAD  BRENNA on 12/2 EF 55% normal RV size function   Vital signs q shift and prn   Labs weekly and prn   Amlodipine 2.5 mg daily   Atorvastatin 10 mg nightly   BP controlled     Dementia   Monitor   Emotional support offered  Monitoring for safety     Multiple Myeloma   Monitor labs   Outpatient follow up labs drawn for Heme/Onc follow up - faxed and they received, no new orders  Remains on steroids now     GERD/Known Duodenal ulcer / GI prophylaxis  EGD scheduled and patient off AC for procedure but then developed PE. EGD on hold for now.   Monitor symptoms   Monitor labs  Lansoprazole 30 mg daily   Follow up with GI outpatient   CBC hgb stable 9.6     Hypotension  - managed  Initially on pressors in hospital. On high dose steroids decreased down to Dexamethasone 4 mg daily- records state need to be titrated down  DW daughter, will wait until improved and plan for outpatient taper  Monitor vital signs  Midodrine 5 mg every 8 hrs prn for SBP less than 95 and DBP less than 50      Hx depression   Monitor symptoms   Sertraline 25 mg daily increased to 50 mg daily now and monitoring  Appears improved mood on 50 mg and some increased energy     Neuropathy  Gabapentin 300 mg bid      Allergic rhinitis  Monitor symptoms   Fexofenadine 180  1-2 muscles (06428):  [] Dry Needling 3+ muscles (119304  [] Group:      [] Other:     GOALS:   Patient stated goal: decreased pain when sitting, driving, performing stairs, and squatting  [] Progressing: [] Met: [] Not Met: [] Adjusted    Therapist goals for Patient:   Short Term Goals: To be achieved in: 2 weeks  1. Independent in HEP and progression per patient tolerance, in order to prevent re-injury. [] Progressing: [] Met: [] Not Met: [] Adjusted  2. Patient will have a decrease in pain to facilitate improvement in movement, function, and ADLs as indicated by Functional Deficits. [] Progressing: [] Met: [] Not Met: [] Adjusted    Long Term Goals: To be achieved in: 6 weeks  1. Disability index score of 25% or less for the LEFS to assist with reaching prior level of function. [] Progressing: [] Met: [] Not Met: [] Adjusted  2. Patient will demonstrate increased AROM to 135 deg L knee flexion to allow for proper joint functioning as indicated by patients Functional Deficits. [] Progressing: [] Met: [] Not Met: [] Adjusted  3. Patient will demonstrate an increase in Strength to at least 5/5 as well as good proximal hip strength and control to allow for proper functional mobility as indicated by patients Functional Deficits. [] Progressing: [] Met: [] Not Met: [] Adjusted  4. Patient will return to functional activities including sitting, driving, performing stairs, and squatting without increased symptoms or restriction. [] Progressing: [] Met: [] Not Met: [] Adjusted  5. Patient will be able to perform SLS > 20 sec B. [] Progressing: [] Met: [] Not Met: [] Adjusted    Overall Progression Towards Functional goals/ Treatment Progress Update:  [] Patient is progressing as expected towards functional goals listed. [] Progression is slowed due to complexities/Impairments listed. [] Progression has been slowed due to co-morbidities.   [x] Plan just implemented, too soon to assess goals progression mg daily prn   Ipratropium 0.03% 2 spray daily prn      DVT Prophylaxis   Encourage early mobilization and participation in PT/OT as able  Xarelto      Pain Management  Offer to pre-medicate 30-60 min prior to therapy  Physiatry evaluation with management appreciated  Acetaminophen 650 mg every 4 hrs prn      Bowel Management Regimen/Constipation   Monitor BM status   Colace 100 mg daily prn   Miralax daily prn   Senna 2 tabs nightly      Vitamins/supplements as r/t deficiencies  [facility] to follow while in rehab; supplementation/diet as per [facility]  May continue home supplements  MVI daily      LABS  CBC/CMP weekly while in [facility]-    *Follow-Up with PCP within 1-2 weeks following [facility] discharge.     Follow up with Urologist at discharge for recurrent UTIs  Follow up with Heme/Onc at discharge for steroid taper  Follow up with GI outpatient   In house ID following    *Established patient; follow-up moderately complex visit/ greater than 30     38 minutes spent w/ patient and staff, including but not limited to/ reviewing present status, needs, abilities with disciplines, reviewing medical records, vital signs, labs, completing medication reconciliation and entering orders for continued care in [facility]. DW ID NP today.      Note to patient: The 21st Century Cures Act makes medical notes like these available to patients in the interest of transparency. However, this is a medical document intended as peer to peer communication. It is written in medical language and may contain abbreviations or verbiage that are unfamiliar. It may appear blunt or direct. Medical documents are intended to carry relevant information, facts as evident, and the clinical opinion of the practitioner who signs the document.    Jessie Villagran, APRN  1/9/2024

## 2024-01-11 ENCOUNTER — OFFICE VISIT (OUTPATIENT)
Dept: CARDIOLOGY CLINIC | Age: 84
End: 2024-01-11
Payer: MEDICARE

## 2024-01-11 VITALS
SYSTOLIC BLOOD PRESSURE: 116 MMHG | OXYGEN SATURATION: 98 % | DIASTOLIC BLOOD PRESSURE: 60 MMHG | BODY MASS INDEX: 25 KG/M2 | HEIGHT: 69 IN | HEART RATE: 83 BPM | WEIGHT: 168.8 LBS

## 2024-01-11 DIAGNOSIS — I48.0 PAF (PAROXYSMAL ATRIAL FIBRILLATION) (HCC): ICD-10-CM

## 2024-01-11 DIAGNOSIS — Z95.2 S/P TAVR (TRANSCATHETER AORTIC VALVE REPLACEMENT): ICD-10-CM

## 2024-01-11 DIAGNOSIS — I25.83 CORONARY ARTERY DISEASE DUE TO LIPID RICH PLAQUE: ICD-10-CM

## 2024-01-11 DIAGNOSIS — E78.00 HYPERCHOLESTEROLEMIA: ICD-10-CM

## 2024-01-11 DIAGNOSIS — I35.0 AORTIC VALVE STENOSIS, NONRHEUMATIC: Primary | ICD-10-CM

## 2024-01-11 DIAGNOSIS — I25.10 CORONARY ARTERY DISEASE DUE TO LIPID RICH PLAQUE: ICD-10-CM

## 2024-01-11 DIAGNOSIS — I10 ESSENTIAL HYPERTENSION: ICD-10-CM

## 2024-01-11 PROCEDURE — G8420 CALC BMI NORM PARAMETERS: HCPCS | Performed by: INTERNAL MEDICINE

## 2024-01-11 PROCEDURE — 3074F SYST BP LT 130 MM HG: CPT | Performed by: INTERNAL MEDICINE

## 2024-01-11 PROCEDURE — G8427 DOCREV CUR MEDS BY ELIG CLIN: HCPCS | Performed by: INTERNAL MEDICINE

## 2024-01-11 PROCEDURE — 1036F TOBACCO NON-USER: CPT | Performed by: INTERNAL MEDICINE

## 2024-01-11 PROCEDURE — 1123F ACP DISCUSS/DSCN MKR DOCD: CPT | Performed by: INTERNAL MEDICINE

## 2024-01-11 PROCEDURE — 3078F DIAST BP <80 MM HG: CPT | Performed by: INTERNAL MEDICINE

## 2024-01-11 PROCEDURE — G8484 FLU IMMUNIZE NO ADMIN: HCPCS | Performed by: INTERNAL MEDICINE

## 2024-01-11 PROCEDURE — 99214 OFFICE O/P EST MOD 30 MIN: CPT | Performed by: INTERNAL MEDICINE

## 2024-01-11 RX ORDER — DULAGLUTIDE 1.5 MG/.5ML
INJECTION, SOLUTION SUBCUTANEOUS
COMMUNITY
Start: 2023-12-12

## 2024-01-16 ASSESSMENT — PATIENT HEALTH QUESTIONNAIRE - PHQ9
SUM OF ALL RESPONSES TO PHQ QUESTIONS 1-9: 0
SUM OF ALL RESPONSES TO PHQ QUESTIONS 1-9: 0
2. FEELING DOWN, DEPRESSED OR HOPELESS: NOT AT ALL
SUM OF ALL RESPONSES TO PHQ9 QUESTIONS 1 & 2: 0
1. LITTLE INTEREST OR PLEASURE IN DOING THINGS: NOT AT ALL
2. FEELING DOWN, DEPRESSED OR HOPELESS: 0
SUM OF ALL RESPONSES TO PHQ QUESTIONS 1-9: 0
SUM OF ALL RESPONSES TO PHQ9 QUESTIONS 1 & 2: 0
SUM OF ALL RESPONSES TO PHQ QUESTIONS 1-9: 0
1. LITTLE INTEREST OR PLEASURE IN DOING THINGS: 0

## 2024-01-19 ENCOUNTER — OFFICE VISIT (OUTPATIENT)
Dept: FAMILY MEDICINE CLINIC | Age: 84
End: 2024-01-19

## 2024-01-19 VITALS
OXYGEN SATURATION: 97 % | HEART RATE: 71 BPM | HEIGHT: 69 IN | BODY MASS INDEX: 24.44 KG/M2 | DIASTOLIC BLOOD PRESSURE: 78 MMHG | WEIGHT: 165 LBS | SYSTOLIC BLOOD PRESSURE: 122 MMHG

## 2024-01-19 DIAGNOSIS — D68.69 SECONDARY HYPERCOAGULABLE STATE (HCC): ICD-10-CM

## 2024-01-19 DIAGNOSIS — I50.22 CHRONIC SYSTOLIC (CONGESTIVE) HEART FAILURE (HCC): ICD-10-CM

## 2024-01-19 DIAGNOSIS — I48.0 PAF (PAROXYSMAL ATRIAL FIBRILLATION) (HCC): ICD-10-CM

## 2024-01-19 DIAGNOSIS — E11.65 TYPE 2 DIABETES MELLITUS WITH HYPERGLYCEMIA, UNSPECIFIED WHETHER LONG TERM INSULIN USE (HCC): Primary | ICD-10-CM

## 2024-01-19 DIAGNOSIS — L98.9 BENIGN SKIN LESION OF FOREHEAD: ICD-10-CM

## 2024-01-19 DIAGNOSIS — I10 ESSENTIAL HYPERTENSION: ICD-10-CM

## 2024-01-19 DIAGNOSIS — E78.2 MIXED HYPERLIPIDEMIA: ICD-10-CM

## 2024-01-19 DIAGNOSIS — Z12.5 SCREENING FOR MALIGNANT NEOPLASM OF PROSTATE: ICD-10-CM

## 2024-01-19 LAB
ALBUMIN SERPL-MCNC: 4.5 G/DL (ref 3.4–5)
ALBUMIN/GLOB SERPL: 1.9 {RATIO} (ref 1.1–2.2)
ALP SERPL-CCNC: 152 U/L (ref 40–129)
ALT SERPL-CCNC: 23 U/L (ref 10–40)
ANION GAP SERPL CALCULATED.3IONS-SCNC: 11 MMOL/L (ref 3–16)
AST SERPL-CCNC: 24 U/L (ref 15–37)
BILIRUB SERPL-MCNC: 0.7 MG/DL (ref 0–1)
BUN SERPL-MCNC: 23 MG/DL (ref 7–20)
CALCIUM SERPL-MCNC: 9.9 MG/DL (ref 8.3–10.6)
CHLORIDE SERPL-SCNC: 98 MMOL/L (ref 99–110)
CHOLEST SERPL-MCNC: 134 MG/DL (ref 0–199)
CO2 SERPL-SCNC: 27 MMOL/L (ref 21–32)
CREAT SERPL-MCNC: 0.9 MG/DL (ref 0.8–1.3)
GFR SERPLBLD CREATININE-BSD FMLA CKD-EPI: >60 ML/MIN/{1.73_M2}
GLUCOSE SERPL-MCNC: 96 MG/DL (ref 70–99)
HBA1C MFR BLD: 5.9 %
HDLC SERPL-MCNC: 70 MG/DL (ref 40–60)
LDLC SERPL CALC-MCNC: 53 MG/DL
POTASSIUM SERPL-SCNC: 5 MMOL/L (ref 3.5–5.1)
PROT SERPL-MCNC: 6.9 G/DL (ref 6.4–8.2)
PSA SERPL DL<=0.01 NG/ML-MCNC: <0.01 NG/ML (ref 0–4)
SODIUM SERPL-SCNC: 136 MMOL/L (ref 136–145)
TRIGL SERPL-MCNC: 53 MG/DL (ref 0–150)
VLDLC SERPL CALC-MCNC: 11 MG/DL

## 2024-01-19 RX ORDER — LISINOPRIL AND HYDROCHLOROTHIAZIDE 20; 12.5 MG/1; MG/1
TABLET ORAL
Qty: 90 TABLET | Refills: 0 | Status: SHIPPED | OUTPATIENT
Start: 2024-01-19

## 2024-01-19 RX ORDER — BLOOD-GLUCOSE METER
EACH MISCELLANEOUS
Qty: 100 STRIP | Refills: 3 | Status: SHIPPED | OUTPATIENT
Start: 2024-01-19

## 2024-01-19 RX ORDER — ATORVASTATIN CALCIUM 80 MG/1
80 TABLET, FILM COATED ORAL NIGHTLY
Qty: 90 TABLET | Refills: 1 | Status: SHIPPED | OUTPATIENT
Start: 2024-01-19

## 2024-01-19 RX ORDER — DILTIAZEM HYDROCHLORIDE 240 MG/1
240 CAPSULE, COATED, EXTENDED RELEASE ORAL DAILY
Qty: 90 CAPSULE | Refills: 3 | Status: SHIPPED | OUTPATIENT
Start: 2024-01-19 | End: 2024-01-19 | Stop reason: SDUPTHER

## 2024-01-19 RX ORDER — DILTIAZEM HYDROCHLORIDE 240 MG/1
240 CAPSULE, COATED, EXTENDED RELEASE ORAL DAILY
Qty: 90 CAPSULE | Refills: 3 | Status: SHIPPED | OUTPATIENT
Start: 2024-01-19 | End: 2025-01-13

## 2024-01-19 RX ORDER — LANCETS 30 GAUGE
1 EACH MISCELLANEOUS DAILY
Qty: 100 EACH | Refills: 3 | Status: SHIPPED | OUTPATIENT
Start: 2024-01-19

## 2024-01-19 ASSESSMENT — ENCOUNTER SYMPTOMS
SHORTNESS OF BREATH: 0
ABDOMINAL PAIN: 0
EYE REDNESS: 0
NAUSEA: 0
COUGH: 0
VOMITING: 0
ABDOMINAL DISTENTION: 0
SINUS PRESSURE: 0
EYE DISCHARGE: 0
SORE THROAT: 0
EYE PAIN: 0
SINUS PAIN: 0
WHEEZING: 0
DIARRHEA: 0

## 2024-01-19 NOTE — PROGRESS NOTES
today.  -     lisinopril-hydroCHLOROthiazide (PRINZIDE;ZESTORETIC) 20-12.5 MG per tablet; TAKE 1 TABLET EVERY DAY, Disp-90 tablet, R-0Normal  4. Chronic systolic (congestive) heart failure (HCC)  -Reviewed most recent echocardiogram.  Appears to have resolved with valve replacement.  Continue to follow with cardiology.  5. Secondary hypercoagulable state (HCC)  -Continues Eliquis for valve replacement and A-fib.  Will need to be on this lifelong.  Refill.  Continue to follow with EP as well.  -     apixaban (ELIQUIS) 5 MG TABS tablet; Take 1 tablet by mouth 2 times daily, Disp-180 tablet, R-3Normal  6. Mixed hyperlipidemia  -Continues atorvastatin.  Due for labs.  No changes pending results  -     Lipid Panel; Future  -     atorvastatin (LIPITOR) 80 MG tablet; Take 1 tablet by mouth nightly, Disp-90 tablet, R-1Normal  7. PAF (paroxysmal atrial fibrillation) (HCC)  -Irregular with controlled rate today.  Continue diltiazem.  Continue Eliquis.  Continue to follow with EP.  -     dilTIAZem (CARDIZEM CD) 240 MG extended release capsule; Take 1 capsule by mouth daily, Disp-90 capsule, R-3Normal  8. Screening for malignant neoplasm of prostate  -     PSA Screening; Future      Return in about 6 months (around 7/19/2024) for Medicare Annual Wellness Visit.    SUBJECTIVE/OBJECTIVE:  ERNESTO Reese presents today with his wife for diabetes follow-up.  He denies any specific concerns today.  He has noted that the spot on his forehead is gotten bigger.  Dermatology had told him that he may need this looked at and removed at some point, and he is interested in having this done.  He is hopeful that he will be able to get off of Trulicity.  Currently taking Trulicity and Synjardy.  Tolerating without side effects.  Has been checking his sugar daily.  Typically running in the low 100s.  Rarely gets above 130.  Denies any increased urination, increased thirst, abdominal pain, or nausea.  He does continue to follow with cardiology and EP.

## 2024-01-19 NOTE — PATIENT INSTRUCTIONS
You may receive a survey regarding the care you received during your visit.  Your input is valuable to us.  We encourage you to complete and return your survey.  We hope you will choose us in the future for your healthcare needs. GENERAL OFFICE POLICIES      Telephone Calls: Messages will be answered within 1-2 business days, unless the provider is out of the office.  If it is urgent a covering provider will answer. (this does not include Medication refills).    MyChart:  We recommend all patients sign up for [employer].  Through this portal you can see your lab results, request refills, schedule appointments, pay your bill and send messages to the office.   [employer] messages will be answered within 1-2 business days unless the provider is out of the office.  For urgent matters, please call the office.  Appointments:  All appointments must be scheduled.  We ask all patients to schedule their next follow up appointment before they leave the office to make sure you will be able to be seen before you run out of medications.  24 hours notice is required to cancel or reschedule an appointment to avoid being marked as a no show.  You may be dismissed from the practice after 3 no shows.    LATE for Appointment: If you are 15 or more minutes late for your appointment, you may be asked to reschedule.  MA/LAB APPTS: Must be scheduled, cannot accept walk in lab visits.  We only draw labs for patients established in our office.  We only do injections for medications ordered by our office.  Acute Sick Visits:  Nothing other than acute complaint will be addressed at this visit.  TRADITIONAL MEDICARE  DOES NOT COVER PHYSICALS  MEDICARE WELLNESS VISITS: These are NOT physicals but the free annual visit offered by Medicare to discuss wellness issues. Medication refills, checkups, etc. will not be addressed during this visit.  Medication Refills: Refills are handled electronically so please contact your pharmacy for medication refills

## 2024-01-22 ENCOUNTER — TELEPHONE (OUTPATIENT)
Dept: FAMILY MEDICINE CLINIC | Age: 84
End: 2024-01-22

## 2024-01-22 DIAGNOSIS — L98.9 LESION OF FACE: Primary | ICD-10-CM

## 2024-01-22 NOTE — TELEPHONE ENCOUNTER
Margoth Honeycutt  [facility] Practice Staff  Dear Staff,    We greatly appreciate your referral to our office; however, I believe this referral was sent to our office assuming Dr. Yasmeen Yanez does general skin checks and unfortunately, she does not.    Dr. aYsmeen Yanez is more sub-specialized as a Mohs Surgeon and schedules patients for pathologically proven skin cancers.    If you are needing to refer a patient for general dermatology, please see the list of dermatology offices below.      Regarding this patient, the referral will be cancelled. Please notify your patient to let them know that we are not accepting referrals for general dermatology.      Dermatologist in the Area that are taking  patients:         Salina Damian MD      [facility]      [address] [place] [address]      Ph. [phone]             Dr. Fidelina Dubois      [facility]      [address] [place] [address]      Ph. [phone]      Fx: [phone]         [facility]      [address] [place] [address]                      Ph. [phone]         [facility]      [address]      [place] [address].       Ph: [phone]         [facility]      Carmen Yanez DO       [address]      [place], [place] [id number] [phone]        If you have any questions, please contact the office at [phone].    Thank you,  Orquidea Ortiz Surgery Scheduler

## 2024-01-22 NOTE — TELEPHONE ENCOUNTER
I believe this will need MOHS, but this was not \"pathologically proven,\" so I'll refer to another dermatologist:    [facility], Emmett Meier MD  [address] [facility] [address]  [place] [id number]  Phone: [phone]  [email or web address]

## 2024-01-22 NOTE — TELEPHONE ENCOUNTER
2. Benign skin lesion of forehead  -The patient does have a concerning lesion to his right forehead.  Has a history of basal cell with Mohs procedure.  He is established with [facility] dermatology who currently is not taking patients.  Will try to place a new referral to them to have this intervened upon.  If they are not accepting him as a patient at this time, will refer to dermatologist of [facility].  -     Bea - Yasmeen Yanez MD, MOHS Surgery, [facility]  3. Essential hypertension  -Controlled on current dose of lisinopril-hydrochlorothiazide along with diltiazem.  Continues to follow with cardiology.  No changes today.  -     lisinopril-hydroCHLOROthiazide (PRINZIDE;ZESTORETIC) 20-12.5 MG per tablet; TAKE 1 TABLET EVERY DAY, Disp-90 tablet, R-0Normal      1/19/2024  [facility]       Earle Perdomo APRN - CNP  Nurse Practitioner

## 2024-01-23 ENCOUNTER — TELEPHONE (OUTPATIENT)
Dept: SURGERY | Age: 84
End: 2024-01-23

## 2024-01-23 NOTE — TELEPHONE ENCOUNTER
States had area treated by Dr. Yanez in July 2023. Has spot on forehead which looks the same and hasn't healed. States saw PCP last week and PCP said should follow up with Dr. Yanez again. Pt also asking if needs biopsy.     [phone]    (NOTE: Please see referrals from 1/19/24 and 1/22/24)

## 2024-01-23 NOTE — TELEPHONE ENCOUNTER
Per clinic, I called patient back to let him know he has a referral to see a dermatologist so he should start there. Pt asked for dermatologist recommendations so I sent list of [facility] practices currently accepting new patients.

## 2024-02-07 DIAGNOSIS — E11.65 TYPE 2 DIABETES MELLITUS WITH HYPERGLYCEMIA, UNSPECIFIED WHETHER LONG TERM INSULIN USE (HCC): ICD-10-CM

## 2024-02-07 RX ORDER — EMPAGLIFLOZIN AND METFORMIN HYDROCHLORIDE 12.5; 5 MG/1; MG/1
2 TABLET ORAL DAILY
Qty: 60 TABLET | Refills: 5 | Status: SHIPPED | OUTPATIENT
Start: 2024-02-07

## 2024-02-25 DIAGNOSIS — I10 ESSENTIAL HYPERTENSION: ICD-10-CM

## 2024-02-26 RX ORDER — LISINOPRIL AND HYDROCHLOROTHIAZIDE 20; 12.5 MG/1; MG/1
TABLET ORAL
Qty: 90 TABLET | Refills: 3 | OUTPATIENT
Start: 2024-02-26

## 2024-03-24 ENCOUNTER — APPOINTMENT (OUTPATIENT)
Dept: CT IMAGING | Age: 84
End: 2024-03-24
Payer: MEDICARE

## 2024-03-24 ENCOUNTER — HOSPITAL ENCOUNTER (EMERGENCY)
Age: 84
Discharge: HOME | End: 2024-03-24
Payer: MEDICARE

## 2024-03-24 VITALS
HEART RATE: 73 BPM | WEIGHT: 165 LBS | OXYGEN SATURATION: 97 % | SYSTOLIC BLOOD PRESSURE: 129 MMHG | TEMPERATURE: 97.5 F | DIASTOLIC BLOOD PRESSURE: 71 MMHG | BODY MASS INDEX: 24.35 KG/M2 | RESPIRATION RATE: 20 BRPM

## 2024-03-24 DIAGNOSIS — S00.212A ABRASION OF EYEBROW, LEFT, INITIAL ENCOUNTER: ICD-10-CM

## 2024-03-24 DIAGNOSIS — S09.90XA CLOSED HEAD INJURY, INITIAL ENCOUNTER: Primary | ICD-10-CM

## 2024-03-24 PROCEDURE — 99284 EMERGENCY DEPT VISIT MOD MDM: CPT

## 2024-03-24 PROCEDURE — 70450 CT HEAD/BRAIN W/O DYE: CPT

## 2024-03-24 ASSESSMENT — PAIN - FUNCTIONAL ASSESSMENT: PAIN_FUNCTIONAL_ASSESSMENT: NONE - DENIES PAIN

## 2024-03-24 NOTE — ED NOTES
Reviewed discharge instructions with patient, patient verbalized understanding, ambulated out of ER on own.

## 2024-03-24 NOTE — ED PROVIDER NOTES
[facility] EMERGENCY DEPARTMENT  EMERGENCY DEPARTMENT ENCOUNTER        Pt Name: Wilfredo Renae  MRN: [id number]  Birthdate 1940  Date of evaluation: 3/24/2024  Provider: Tino Ortiz PA-C  PCP: Florentin Chavez MD  Note Started: 3:29 PM EDT 3/24/24      PROSPER. I have evaluated this patient.        CHIEF COMPLAINT       Chief Complaint   Patient presents with    Fall     Pt reports tripping over one step at approx 1300, laceration noted above left eyebrow, bleeding controlled. Takes eliquis       HISTORY OF PRESENT ILLNESS: 1 or more Elements     History From: patient    Wilfredo Renae is a 83 y.o. male who presents complaining of a head injury 2 hours prior to arrival.  Patient states he was at [employer] and tripped on a step.  He hit his left eyebrow, glasses, on the wall.  Denies any head injury, headache, vision change, eye pain, vomiting, neck pain, weakness, paresthesia.  He does take Eliquis, took it this morning.  He has been ambulatory since without difficulty.    Nursing Notes were all reviewed and agreed with or any disagreements were addressed in the HPI.    REVIEW OF SYSTEMS :      Review of Systems   All other systems reviewed and are negative.      Positives and Pertinent negatives as per HPI.       PAST MEDICAL HISTORY    has a past medical history of Aortic regurgitation (01/01/2009), Aortic stenosis, Arthritis, Asthma, CAD (coronary artery disease), Cataract, Diabetes ([facility]), Environmental allergies, Heart murmur, Hypercholesterolemia, Hypertension, Impaired fasting glucose, and Prostate cancer ([facility]) (01/01/2006).     SURGICAL HISTORY     Past Surgical History:   Procedure Laterality Date    AORTIC VALVE REPLACEMENT N/A 08/02/2022    TRANSCATHETER AORTIC VALVE REPLACEMENT FEMORAL APPROACH performed by Antonio Bergeron MD at [facility]    AORTIC VALVE REPLACEMENT N/A 08/02/2022    TRANSCATHETER AORTIC VALVE REPLACEMENT FEMORAL APPROACH performed by

## 2024-03-25 ENCOUNTER — CARE COORDINATION (OUTPATIENT)
Dept: CARE COORDINATION | Age: 84
End: 2024-03-25

## 2024-03-25 NOTE — CARE COORDINATION
Ambulatory Care Coordination  ED Follow up Call    Reason for ED visit:  fall    Status:     significantly improved    Did you call your PCP prior to going to the ED?  No      Did you receive a discharge instructions from the Emergency Room? Yes  Review of Instructions:     Understands what to report/when to return?:  Yes   Understands discharge instructions?:  Yes   Following discharge instructions?:  Yes   If not why?     Are there any new complaints of pain? No  New Pain Meds? No    Constipation prophylaxis needed?  N/A    If you have a wound is the dressing clean, dry, and intact? N/A  Understands wound care regimen? N/A    Are there any other complaints/concerns that you wish to tell your provider?   no    FU appts/Provider:    Future Appointments   Date Time Provider Department Center   5/28/2024  8:30 AM Yasmeen Yanez MD MOHS SURG [facility]   7/17/2024  8:30 AM ECHO ROOM 1 [facility]   7/17/2024  9:30 AM Pernell Chamberlain MD FF Cardio [facility]   7/19/2024  8:30 AM Earle Perdomo APRN - CNP [facility]       Patient stated that he is back to baseline. He reported he went to the ED to be evaluated after his fall due to taking blood thinners. Denied follow up appointment at this time.     New Medications?:   No      Medication Reconciliation by phone - No  Understands Medications?  Yes  Taking Medications? Yes  Can you swallow your pills?  Yes    Any further needs in the home i.e. Equipment?  Not Applicable    Link to services in community?:  N/A   Which services:

## 2024-05-03 DIAGNOSIS — I10 ESSENTIAL HYPERTENSION: ICD-10-CM

## 2024-05-03 RX ORDER — LISINOPRIL AND HYDROCHLOROTHIAZIDE 20; 12.5 MG/1; MG/1
TABLET ORAL
Qty: 90 TABLET | Refills: 3 | Status: SHIPPED | OUTPATIENT
Start: 2024-05-03

## 2024-05-28 ENCOUNTER — PROCEDURE VISIT (OUTPATIENT)
Dept: SURGERY | Age: 84
End: 2024-05-28
Payer: MEDICARE

## 2024-05-28 VITALS — SYSTOLIC BLOOD PRESSURE: 142 MMHG | DIASTOLIC BLOOD PRESSURE: 91 MMHG | HEART RATE: 73 BPM

## 2024-05-28 DIAGNOSIS — C44.319 BASAL CELL CARCINOMA (BCC) OF RIGHT FOREHEAD: Primary | ICD-10-CM

## 2024-05-28 DIAGNOSIS — Z79.01 ANTICOAGULATED: ICD-10-CM

## 2024-05-28 PROCEDURE — 17311 MOHS 1 STAGE H/N/HF/G: CPT | Performed by: DERMATOLOGY

## 2024-05-28 PROCEDURE — 12053 INTMD RPR FACE/MM 5.1-7.5 CM: CPT | Performed by: DERMATOLOGY

## 2024-05-28 NOTE — PROGRESS NOTES
PRE-PROCEDURE SCREENING    Pacemaker/ICD: No  Difficulty with numbing in the past: No  Local Anesthesia Reaction/passing out: No  Latex or adhesive allergy:  No  Any history of reaction to suture or skin glue:  no  Bleeding/Clotting Disorders: No  Anticoagulant Therapy: Yes, eliquis and asa 81 mg - valve replaced  Joint prosthesis: No  Artificial Heart Valve: Yes, aortic valve  Stroke or Seizures: No  Organ Transplant or Lymphoma: No  Immunosuppression: No  Respiratory Problems: No

## 2024-05-28 NOTE — PROGRESS NOTES
MOHS PROCEDURE NOTE    PHYSICIAN:  Yasmeen Yanez MD, Who operated in two distinct and integrated capacities as the surgeon removing the tissue and as the pathologist examining the tissue.    ASSISTANT: Katharina Zamora LPN     REFERRING PROVIDER:  Carmen Yanez D.O    PREOPERATIVE DIAGNOSIS: Nodular Basal Cell Carcinoma     SPECIFIC MOHS INDICATIONS:  Size, location    AUC SCORIN/9    POSTOPERATIVE DIAGNOSIS: SAME    LOCATION: Right lateral forehead    OPERATIVE PROCEDURE:  MOHS MICROGRAPHIC SURGERY    RECONSTRUCTION OF DEFECT: Intermediate layered closure    PREOPERATIVE SIZE: 21 x 8 MM    DEFECT SIZE: 22 x 14 MM    LENGTH OF REPAIRED WOUND/SIZE OF FLAP/SIZE OF GRAFT:  52 MM    ANESTHESIA:  8 mL 1% lidocaine with epinephrine 1:100,000 buffered.     EBL:  MINIMAL    DURATION OF PROCEDURE:  1 HOUR    POSTOPERATIVE OBSERVATION: 1 HOUR    SPECIMENS:  SEE MOHS MAP    COMPLICATIONS:  NONE    DESCRIPTION OF PROCEDURE:  The patient was given a mirror, as appropriate, and the biopsy site was identified, marked with a surgical marking pen, and verified by the patient.   Options for treatment were discussed and the patient was informed that Mohs surgery was the selected treatment based on its lower recurrence rate, given the features listed above, as compared to other treatment modalities such as excision, radiation, or curettage, and agreed with this treatment plan.  Risks and benefits including bruising, swelling, bleeding, infection, nerve injury, recurrence, and scarring were discussed with the patient prior to the procedure and a written consent detailing these and other risks was reviewed with the patient and signed.    There was a time out for person and procedure verification.  The surgical site was prepped with an antiseptic solution.  Application of an antiseptic solution was repeated before each surgical stage.      Stage I:  The clinically-apparent tumor was carefully defined and debulked,

## 2024-05-29 ENCOUNTER — TELEPHONE (OUTPATIENT)
Dept: SURGERY | Age: 84
End: 2024-05-29

## 2024-05-29 NOTE — TELEPHONE ENCOUNTER
The patient was in the office on 05/28/2024 for Mohs located on the RT lateral forehead with ILC repair.  The patient tolerated the procedure well and left the office in good condition.    Pain level on post-operative day 1:  Pain level - down jawline last evening - 6 - none this am - he did take Tylenol last evening - and this resolved his pain level.     Any bleeding episode that required pressure to be held, bandage change or a call to the office or MD?  no     Any other issues?:  no    A post-operative telephone call was placed at 9:21a, 05/29/2024,  in order to check on the patient's recovery process.  The patient reported doing well and had no complaints other than those listed above, if any.  All of the patient's questions were answered. Advised to call w/any questions/concerns.

## 2024-07-08 DIAGNOSIS — E78.2 MIXED HYPERLIPIDEMIA: ICD-10-CM

## 2024-07-08 RX ORDER — ATORVASTATIN CALCIUM 80 MG/1
80 TABLET, FILM COATED ORAL NIGHTLY
Qty: 90 TABLET | Refills: 1 | Status: SHIPPED | OUTPATIENT
Start: 2024-07-08

## 2024-07-10 ENCOUNTER — PROCEDURE VISIT (OUTPATIENT)
Dept: SURGERY | Age: 84
End: 2024-07-10
Payer: MEDICARE

## 2024-07-10 VITALS — DIASTOLIC BLOOD PRESSURE: 72 MMHG | SYSTOLIC BLOOD PRESSURE: 126 MMHG | HEART RATE: 78 BPM

## 2024-07-10 DIAGNOSIS — Z79.01 ANTICOAGULATED: ICD-10-CM

## 2024-07-10 DIAGNOSIS — C44.319 BASAL CELL CARCINOMA (BCC) OF LEFT FOREHEAD: Primary | ICD-10-CM

## 2024-07-10 PROCEDURE — 12052 INTMD RPR FACE/MM 2.6-5.0 CM: CPT | Performed by: DERMATOLOGY

## 2024-07-10 PROCEDURE — 17311 MOHS 1 STAGE H/N/HF/G: CPT | Performed by: DERMATOLOGY

## 2024-07-10 NOTE — PROGRESS NOTES
PRE-PROCEDURE SCREENING    Pacemaker/ICD: No  Difficulty with numbing in the past: No  Local Anesthesia Reaction/passing out: No  Latex or adhesive allergy:  No  Any history of reaction to suture or skin glue:  no  Bleeding/Clotting Disorders: No  Anticoagulant Therapy: Yes, eliquis and asa 81 mg - aortic valve replacement  Joint prosthesis: No  Artificial Heart Valve: Yes, aortic valve  Stroke or Seizures: No  Organ Transplant or Lymphoma: No  Immunosuppression: No  Respiratory Problems: No   
Aquaphor ointment was applied, and the wound was covered.  A dressing was applied for stabilization and light pressure.  The patient was given detailed oral and written instructions on postoperative care.     There were no complications.  The patient left the Unit in good medical condition.     FOLLOW-UP:  As liquid skin adhesive was placed for epidermal closure, the patient was asked to return if any questions or concerns arose, but otherwise will return to see general dermatology per their instructions.

## 2024-07-11 ENCOUNTER — TELEPHONE (OUTPATIENT)
Dept: SURGERY | Age: 84
End: 2024-07-11

## 2024-07-11 NOTE — TELEPHONE ENCOUNTER
The patient was in the office on 7.10.24 for MOHS procedure located on the Left lateral inferior forehead  with ILC repair.  The patient tolerated the procedure well and left the office in good condition.    Pain level on post-operative day 1:  none    Any bleeding episode that required pressure to be held, bandage change or a call to the office or MD?  no     Any other issues?:  no    A post-operative telephone call was placed at 2:28 p.m. in order to check on the patient's recovery process.  The patient reported doing well and had no complaints other than those listed above, if any.  All of the patient's questions were answered.

## 2024-07-14 NOTE — PROGRESS NOTES
history of SCD.      All questions and concerns were addressed to the patient/family. Alternatives to my treatment were discussed. I have discussed the above stated plan and the patient verbalized understanding and agreed with the plan.    Scribe attestation: This note was scribed in the presence of Pernell Mendoza MD by Laurie Aguilar RN    Physician Attestation: I, Dr. Pernell mendoza, confirm that the scribe's documentation has been prepared under my direction and personally reviewed by me in its entirety.  I also confirm that the note above accurately reflects all work, treatment, procedures, and medical decision making performed by me.     NOTE: This report was transcribed using voice recognition software. Every effort was made to ensure accuracy, however, inadvertent computerized transcription errors may be present.     Pernell Mendoza MD, MPH  [facility]   Office: [phone]  Fax: [phone]

## 2024-07-16 DIAGNOSIS — I35.0 NONRHEUMATIC AORTIC VALVE STENOSIS: Primary | ICD-10-CM

## 2024-07-16 SDOH — HEALTH STABILITY: PHYSICAL HEALTH: ON AVERAGE, HOW MANY MINUTES DO YOU ENGAGE IN EXERCISE AT THIS LEVEL?: 40 MIN

## 2024-07-16 SDOH — HEALTH STABILITY: PHYSICAL HEALTH: ON AVERAGE, HOW MANY DAYS PER WEEK DO YOU ENGAGE IN MODERATE TO STRENUOUS EXERCISE (LIKE A BRISK WALK)?: 5 DAYS

## 2024-07-16 ASSESSMENT — LIFESTYLE VARIABLES
HOW OFTEN DO YOU HAVE SIX OR MORE DRINKS ON ONE OCCASION: 1
HOW MANY STANDARD DRINKS CONTAINING ALCOHOL DO YOU HAVE ON A TYPICAL DAY: 1 OR 2
HOW MANY STANDARD DRINKS CONTAINING ALCOHOL DO YOU HAVE ON A TYPICAL DAY: 1
HOW OFTEN DO YOU HAVE A DRINK CONTAINING ALCOHOL: 2
HOW OFTEN DO YOU HAVE A DRINK CONTAINING ALCOHOL: MONTHLY OR LESS

## 2024-07-16 ASSESSMENT — PATIENT HEALTH QUESTIONNAIRE - PHQ9
SUM OF ALL RESPONSES TO PHQ QUESTIONS 1-9: 0
2. FEELING DOWN, DEPRESSED OR HOPELESS: NOT AT ALL
1. LITTLE INTEREST OR PLEASURE IN DOING THINGS: NOT AT ALL
SUM OF ALL RESPONSES TO PHQ QUESTIONS 1-9: 0
SUM OF ALL RESPONSES TO PHQ9 QUESTIONS 1 & 2: 0

## 2024-07-17 ENCOUNTER — HOSPITAL ENCOUNTER (OUTPATIENT)
Age: 84
Discharge: HOME | End: 2024-07-19
Payer: MEDICARE

## 2024-07-17 ENCOUNTER — OFFICE VISIT (OUTPATIENT)
Dept: CARDIOLOGY CLINIC | Age: 84
End: 2024-07-17
Payer: MEDICARE

## 2024-07-17 VITALS
BODY MASS INDEX: 25.18 KG/M2 | OXYGEN SATURATION: 97 % | DIASTOLIC BLOOD PRESSURE: 64 MMHG | HEART RATE: 67 BPM | HEIGHT: 69 IN | WEIGHT: 170 LBS | SYSTOLIC BLOOD PRESSURE: 130 MMHG

## 2024-07-17 VITALS
HEIGHT: 69 IN | WEIGHT: 165 LBS | BODY MASS INDEX: 24.44 KG/M2 | SYSTOLIC BLOOD PRESSURE: 126 MMHG | DIASTOLIC BLOOD PRESSURE: 72 MMHG

## 2024-07-17 DIAGNOSIS — I25.10 CORONARY ARTERY DISEASE DUE TO CALCIFIED CORONARY LESION: ICD-10-CM

## 2024-07-17 DIAGNOSIS — I25.84 CORONARY ARTERY DISEASE DUE TO CALCIFIED CORONARY LESION: ICD-10-CM

## 2024-07-17 DIAGNOSIS — I35.0 NONRHEUMATIC AORTIC VALVE STENOSIS: ICD-10-CM

## 2024-07-17 DIAGNOSIS — I48.0 PAF (PAROXYSMAL ATRIAL FIBRILLATION) (HCC): ICD-10-CM

## 2024-07-17 DIAGNOSIS — I10 ESSENTIAL HYPERTENSION: Primary | ICD-10-CM

## 2024-07-17 DIAGNOSIS — I35.1 NONRHEUMATIC AORTIC VALVE INSUFFICIENCY: ICD-10-CM

## 2024-07-17 DIAGNOSIS — I35.0 AORTIC STENOSIS, SEVERE: ICD-10-CM

## 2024-07-17 LAB
ECHO AO ASC DIAM: 3.5 CM
ECHO AO ASCENDING AORTA INDEX: 1.84 CM/M2
ECHO AO ROOT DIAM: 2.7 CM
ECHO AO ROOT INDEX: 1.42 CM/M2
ECHO AV AREA PEAK VELOCITY: 2.1 CM2
ECHO AV AREA VTI: 2.2 CM2
ECHO AV AREA/BSA PEAK VELOCITY: 1.1 CM2/M2
ECHO AV AREA/BSA VTI: 1.2 CM2/M2
ECHO AV MEAN GRADIENT: 19 MMHG
ECHO AV MEAN VELOCITY: 1.9 M/S
ECHO AV PEAK GRADIENT: 36 MMHG
ECHO AV PEAK VELOCITY: 3.1 M/S
ECHO AV VELOCITY RATIO: 0.48
ECHO AV VTI: 56.5 CM
ECHO BSA: 1.91 M2
ECHO EST RA PRESSURE: 3 MMHG
ECHO IVC PROX: 1.5 CM
ECHO LA AREA 2C: 19.5 CM2
ECHO LA AREA 4C: 14 CM2
ECHO LA DIAMETER INDEX: 1.79 CM/M2
ECHO LA DIAMETER: 3.4 CM
ECHO LA MAJOR AXIS: 4.8 CM
ECHO LA MINOR AXIS: 5.9 CM
ECHO LA TO AORTIC ROOT RATIO: 1.26
ECHO LA VOL BP: 45 ML (ref 18–58)
ECHO LA VOL MOD A2C: 54 ML (ref 18–58)
ECHO LA VOL MOD A4C: 31 ML (ref 18–58)
ECHO LA VOL/BSA BIPLANE: 24 ML/M2 (ref 16–34)
ECHO LA VOLUME INDEX MOD A2C: 28 ML/M2 (ref 16–34)
ECHO LA VOLUME INDEX MOD A4C: 16 ML/M2 (ref 16–34)
ECHO LV E' LATERAL VELOCITY: 5 CM/S
ECHO LV E' SEPTAL VELOCITY: 7 CM/S
ECHO LV EDV A2C: 112 ML
ECHO LV EDV A4C: 114 ML
ECHO LV EDV INDEX A4C: 60 ML/M2
ECHO LV EDV NDEX A2C: 59 ML/M2
ECHO LV EJECTION FRACTION A2C: 57 %
ECHO LV EJECTION FRACTION A4C: 52 %
ECHO LV EJECTION FRACTION BIPLANE: 54 % (ref 55–100)
ECHO LV ESV A2C: 49 ML
ECHO LV ESV A4C: 55 ML
ECHO LV ESV INDEX A2C: 26 ML/M2
ECHO LV ESV INDEX A4C: 29 ML/M2
ECHO LV FRACTIONAL SHORTENING: 39 % (ref 28–44)
ECHO LV INTERNAL DIMENSION DIASTOLE INDEX: 2 CM/M2
ECHO LV INTERNAL DIMENSION DIASTOLIC: 3.8 CM (ref 4.2–5.9)
ECHO LV INTERNAL DIMENSION SYSTOLIC INDEX: 1.21 CM/M2
ECHO LV INTERNAL DIMENSION SYSTOLIC: 2.3 CM
ECHO LV IVSD: 1.4 CM (ref 0.6–1)
ECHO LV MASS 2D: 194.1 G (ref 88–224)
ECHO LV MASS INDEX 2D: 102.2 G/M2 (ref 49–115)
ECHO LV POSTERIOR WALL DIASTOLIC: 1.4 CM (ref 0.6–1)
ECHO LV RELATIVE WALL THICKNESS RATIO: 0.74
ECHO LVOT AREA: 4.2 CM2
ECHO LVOT AV VTI INDEX: 0.54
ECHO LVOT DIAM: 2.3 CM
ECHO LVOT MEAN GRADIENT: 5 MMHG
ECHO LVOT PEAK GRADIENT: 9 MMHG
ECHO LVOT PEAK VELOCITY: 1.5 M/S
ECHO LVOT STROKE VOLUME INDEX: 66.7 ML/M2
ECHO LVOT SV: 126.7 ML
ECHO LVOT VTI: 30.5 CM
ECHO MV A VELOCITY: 1.13 M/S
ECHO MV E DECELERATION TIME (DT): 193 MS
ECHO MV E VELOCITY: 0.88 M/S
ECHO MV E/A RATIO: 0.78
ECHO MV E/E' LATERAL: 17.6
ECHO MV E/E' RATIO (AVERAGED): 15.09
ECHO MV E/E' SEPTAL: 12.57
ECHO PULMONARY ARTERY END DIASTOLIC PRESSURE: 5 MMHG
ECHO PV MAX VELOCITY: 1.5 M/S
ECHO PV PEAK GRADIENT: 9 MMHG
ECHO PV REGURGITANT MAX VELOCITY: 1.1 M/S
ECHO RA AREA 4C: 11.4 CM2
ECHO RA END SYSTOLIC VOLUME APICAL 4 CHAMBER INDEX BSA: 11 ML/M2
ECHO RA VOLUME: 21 ML
ECHO RIGHT VENTRICULAR SYSTOLIC PRESSURE (RVSP): 35 MMHG
ECHO RV BASAL DIMENSION: 3.2 CM
ECHO RV FREE WALL PEAK S': 16 CM/S
ECHO RV TAPSE: 2.4 CM (ref 1.7–?)
ECHO TV REGURGITANT MAX VELOCITY: 2.85 M/S
ECHO TV REGURGITANT PEAK GRADIENT: 33 MMHG

## 2024-07-17 PROCEDURE — G8427 DOCREV CUR MEDS BY ELIG CLIN: HCPCS | Performed by: INTERNAL MEDICINE

## 2024-07-17 PROCEDURE — 99214 OFFICE O/P EST MOD 30 MIN: CPT | Performed by: INTERNAL MEDICINE

## 2024-07-17 PROCEDURE — 1036F TOBACCO NON-USER: CPT | Performed by: INTERNAL MEDICINE

## 2024-07-17 PROCEDURE — G2211 COMPLEX E/M VISIT ADD ON: HCPCS | Performed by: INTERNAL MEDICINE

## 2024-07-17 PROCEDURE — 93306 TTE W/DOPPLER COMPLETE: CPT | Performed by: INTERNAL MEDICINE

## 2024-07-17 PROCEDURE — G8420 CALC BMI NORM PARAMETERS: HCPCS | Performed by: INTERNAL MEDICINE

## 2024-07-17 PROCEDURE — 3075F SYST BP GE 130 - 139MM HG: CPT | Performed by: INTERNAL MEDICINE

## 2024-07-17 PROCEDURE — 93306 TTE W/DOPPLER COMPLETE: CPT

## 2024-07-17 PROCEDURE — 1123F ACP DISCUSS/DSCN MKR DOCD: CPT | Performed by: INTERNAL MEDICINE

## 2024-07-17 PROCEDURE — 93000 ELECTROCARDIOGRAM COMPLETE: CPT | Performed by: INTERNAL MEDICINE

## 2024-07-17 PROCEDURE — 3078F DIAST BP <80 MM HG: CPT | Performed by: INTERNAL MEDICINE

## 2024-07-17 SDOH — ECONOMIC STABILITY: FOOD INSECURITY: WITHIN THE PAST 12 MONTHS, THE FOOD YOU BOUGHT JUST DIDN'T LAST AND YOU DIDN'T HAVE MONEY TO GET MORE.: NEVER TRUE

## 2024-07-17 SDOH — ECONOMIC STABILITY: FOOD INSECURITY: WITHIN THE PAST 12 MONTHS, YOU WORRIED THAT YOUR FOOD WOULD RUN OUT BEFORE YOU GOT MONEY TO BUY MORE.: NEVER TRUE

## 2024-07-17 SDOH — ECONOMIC STABILITY: INCOME INSECURITY: HOW HARD IS IT FOR YOU TO PAY FOR THE VERY BASICS LIKE FOOD, HOUSING, MEDICAL CARE, AND HEATING?: NOT HARD AT ALL

## 2024-07-19 ENCOUNTER — OFFICE VISIT (OUTPATIENT)
Dept: FAMILY MEDICINE CLINIC | Age: 84
End: 2024-07-19

## 2024-07-19 VITALS
HEART RATE: 64 BPM | WEIGHT: 170.4 LBS | OXYGEN SATURATION: 97 % | BODY MASS INDEX: 25.24 KG/M2 | SYSTOLIC BLOOD PRESSURE: 126 MMHG | DIASTOLIC BLOOD PRESSURE: 68 MMHG | HEIGHT: 69 IN

## 2024-07-19 DIAGNOSIS — E78.00 HYPERCHOLESTEREMIA: ICD-10-CM

## 2024-07-19 DIAGNOSIS — I50.22 CHRONIC SYSTOLIC (CONGESTIVE) HEART FAILURE (HCC): ICD-10-CM

## 2024-07-19 DIAGNOSIS — I48.0 PAF (PAROXYSMAL ATRIAL FIBRILLATION) (HCC): ICD-10-CM

## 2024-07-19 DIAGNOSIS — I10 ESSENTIAL HYPERTENSION: ICD-10-CM

## 2024-07-19 DIAGNOSIS — E11.65 TYPE 2 DIABETES MELLITUS WITH HYPERGLYCEMIA, UNSPECIFIED WHETHER LONG TERM INSULIN USE (HCC): ICD-10-CM

## 2024-07-19 DIAGNOSIS — Z00.00 MEDICARE ANNUAL WELLNESS VISIT, SUBSEQUENT: Primary | ICD-10-CM

## 2024-07-19 DIAGNOSIS — D68.69 SECONDARY HYPERCOAGULABLE STATE (HCC): ICD-10-CM

## 2024-07-19 LAB — HBA1C MFR BLD: 6.6 %

## 2024-07-19 RX ORDER — EMPAGLIFLOZIN AND METFORMIN HYDROCHLORIDE 12.5; 5 MG/1; MG/1
2 TABLET ORAL DAILY
Qty: 180 TABLET | Refills: 3 | Status: SHIPPED | OUTPATIENT
Start: 2024-07-19

## 2024-07-19 RX ORDER — AMOXICILLIN 500 MG/1
CAPSULE ORAL
Qty: 4 CAPSULE | Refills: 2 | Status: SHIPPED | OUTPATIENT
Start: 2024-07-19

## 2024-07-19 ASSESSMENT — PATIENT HEALTH QUESTIONNAIRE - PHQ9
SUM OF ALL RESPONSES TO PHQ QUESTIONS 1-9: 0
1. LITTLE INTEREST OR PLEASURE IN DOING THINGS: NOT AT ALL
2. FEELING DOWN, DEPRESSED OR HOPELESS: NOT AT ALL
SUM OF ALL RESPONSES TO PHQ QUESTIONS 1-9: 0
SUM OF ALL RESPONSES TO PHQ9 QUESTIONS 1 & 2: 0
SUM OF ALL RESPONSES TO PHQ QUESTIONS 1-9: 0
SUM OF ALL RESPONSES TO PHQ QUESTIONS 1-9: 0

## 2024-07-19 NOTE — PROGRESS NOTES
Medicare Annual Wellness Visit    Wilfredo Renae is here for Medicare AWV (MEDICARE AWV)    Assessment & Plan   Medicare annual wellness visit, subsequent  Type 2 diabetes mellitus with hyperglycemia, unspecified whether long term insulin use (HCC) - check A1c - refill done on medication - home bs good  Utd on eye checks - feet care dw/ pt - will see podiatry  -     Empagliflozin-metFORMIN HCl (SYNJARDY) 12.5-500 MG TABS; Take 2 tablets by mouth daily, Disp-180 tablet, R-3Normal  PAF (paroxysmal atrial fibrillation) (HCC) - f/u cardio  Chronic systolic (congestive) heart failure - stable on sglt2i  Secondary hypercoagulable state (HCC)  Hypercholesteremia- on lipitor 80  Essential hypertension - stable on diltiazem    Utd on vaccines - consider rsv w/ boosters in fall    Recommendations for Preventive Services Due: see orders and patient instructions/AVS.  Recommended screening schedule for the next 5-10 years is provided to the patient in written form: see Patient Instructions/AVS.     Return in about 1 year (around 7/19/2025) for FOLLOW UP WITH DR. BAPTISTE NEXT VISIT, AWV.     Subjective     Seen by cardio - EP - dr. Chamberlain recently for PAF/ CAD - s/p PCI to LAD 7/22 - sees dr. Bergeron, severe AS  EF okay  Had TAVR 8/22  Had recent bcc removed  Only on synjardy 2 tabs in am - no hypoglycemia  Bs at home 110-120 typically in am- steady  No neuropathy sxs  Had cataract surgery -still trouble w/ right eye.  3 bcc cancer removed head/ face and nose.  Tries to avoid sunburn  Walks 3/4 to 1.5 meals   Diet fairly good - enjoys a good burger.  Has hearing aids  Bruises easily - on eliquis    Patient's complete Health Risk Assessment and screening values have been reviewed and are found in Flowsheets. The following problems were reviewed today and where indicated follow up appointments were made and/or referrals ordered.    Positive Risk Factor Screenings with Interventions:    Fall Risk:  Do you feel unsteady or are you

## 2024-07-19 NOTE — PATIENT INSTRUCTIONS

## 2024-10-11 ENCOUNTER — TELEPHONE (OUTPATIENT)
Dept: CARDIOLOGY CLINIC | Age: 84
End: 2024-10-11

## 2024-10-11 NOTE — TELEPHONE ENCOUNTER
Yesenia, I'm doing DCE recalls and have a question on this patient.  His recall says OV only.  The paper you gave me says TAVR w/echo, but there are no orders.  He did just have an echo on 7/17/24.  Does he need another one in January?  Please advise.  If so, can you put in the orders?  Please and thank you!

## 2024-10-22 DIAGNOSIS — E11.65 TYPE 2 DIABETES MELLITUS WITH HYPERGLYCEMIA, UNSPECIFIED WHETHER LONG TERM INSULIN USE (HCC): ICD-10-CM

## 2024-10-22 RX ORDER — BLOOD-GLUCOSE METER
EACH MISCELLANEOUS
Qty: 100 STRIP | Refills: 3 | Status: SHIPPED | OUTPATIENT
Start: 2024-10-22

## 2024-11-02 DIAGNOSIS — E78.2 MIXED HYPERLIPIDEMIA: ICD-10-CM

## 2024-11-04 RX ORDER — ATORVASTATIN CALCIUM 80 MG/1
80 TABLET, FILM COATED ORAL NIGHTLY
Qty: 90 TABLET | Refills: 0 | Status: SHIPPED | OUTPATIENT
Start: 2024-11-04

## 2024-11-20 DIAGNOSIS — E11.65 TYPE 2 DIABETES MELLITUS WITH HYPERGLYCEMIA, UNSPECIFIED WHETHER LONG TERM INSULIN USE (HCC): ICD-10-CM

## 2024-11-20 RX ORDER — EMPAGLIFLOZIN AND METFORMIN HYDROCHLORIDE 12.5; 5 MG/1; MG/1
2 TABLET ORAL DAILY
Qty: 180 TABLET | Refills: 1 | Status: SHIPPED | OUTPATIENT
Start: 2024-11-20

## 2024-12-05 DIAGNOSIS — I48.0 PAF (PAROXYSMAL ATRIAL FIBRILLATION) (HCC): ICD-10-CM

## 2024-12-05 DIAGNOSIS — D68.69 SECONDARY HYPERCOAGULABLE STATE (HCC): ICD-10-CM

## 2024-12-05 RX ORDER — APIXABAN 5 MG/1
5 TABLET, FILM COATED ORAL 2 TIMES DAILY
Qty: 180 TABLET | Refills: 0 | Status: SHIPPED | OUTPATIENT
Start: 2024-12-05

## 2024-12-05 RX ORDER — DILTIAZEM HYDROCHLORIDE 240 MG/1
240 CAPSULE, COATED, EXTENDED RELEASE ORAL DAILY
Qty: 90 CAPSULE | Refills: 0 | Status: SHIPPED | OUTPATIENT
Start: 2024-12-05

## 2025-01-10 ENCOUNTER — PATIENT MESSAGE (OUTPATIENT)
Dept: FAMILY MEDICINE CLINIC | Age: 85
End: 2025-01-10

## 2025-01-13 NOTE — PROGRESS NOTES
[facility]  H+P  Consult  OP Visit  FU Visit   CC/HPI   CC Followup visit for cardiac conditions detailed in assessment and plan below.   Intervention TAVR   General Doing well.  No new concerns.     Cardiac Sx -CP, -SOB, -dizziness, -syncope, -edema, -orthopnea, -pnd, -fatigue, -palpitations   HISTORY/ALLERGY/ROS   M/S/S/F Hx Reviewed in Epic and updated as appropriate   ALLERG Patient has no known allergies.   ROS Full ROS obtained and negative except as mentioned in HPI   MEDICATIONS   Cardiac medications reviewed in Epic during visit with patient.   PHYSICAL EXAM   Vitals /70 (Site: Left Upper Arm, Position: Sitting, Cuff Size: Medium Adult)   Pulse 79   Ht 1.753 m (5' 9.02\")   Wt 79.4 kg (175 lb)   SpO2 98%   BMI 25.83 kg/m²    Gen Alert, coop, no distress Heart  RRR, no MRG   Lung CTA-B, unlabored, no DTP Extrem Edema -Grade 0 (0mm)      COMPLIANCE   Discussed and counseled on diet, exercise, weight loss, smoking, alcohol, drugs.  All questions answered.   CODING   SCI (06009) - 30-39 mins spent reviewing hx/tests/consults, performing exam, counseling/educating, ordering meds/tests/procedures, referring/communicating w/PCP/consultants, documenting in EMR, interpreting results, communicating medical information and plan with family.   SCRIBE ATTESTATION   Nurse I, Yesenia Langford, RN, am scribing for and in the presence of Antonio Bergeron MD. 1/13/2025 11:42 AM EST   Doctor Yesenia Langford is working as scribe for and in presence of me and may have prepopulated components of note with my historical intellectual property (IP) under my supervision.  Any additions to this IP performed in my presence and at my direction. Content and accuracy of this note reviewed by me (Antonio Bergeron MD).   NEW MEDICATIONS   Pt verbalizes understanding of the need for treatment and education provided at today's visit.  Additional education provided in AVS.   ASSESSMENT AND PLAN   *AS/CAD    Date EF Detail   Sx

## 2025-01-16 ENCOUNTER — OFFICE VISIT (OUTPATIENT)
Dept: CARDIOLOGY CLINIC | Age: 85
End: 2025-01-16
Payer: MEDICARE

## 2025-01-16 VITALS
HEART RATE: 79 BPM | BODY MASS INDEX: 25.92 KG/M2 | DIASTOLIC BLOOD PRESSURE: 70 MMHG | OXYGEN SATURATION: 98 % | HEIGHT: 69 IN | WEIGHT: 175 LBS | SYSTOLIC BLOOD PRESSURE: 118 MMHG

## 2025-01-16 DIAGNOSIS — I48.0 PAF (PAROXYSMAL ATRIAL FIBRILLATION) (HCC): ICD-10-CM

## 2025-01-16 DIAGNOSIS — E78.00 HYPERCHOLESTEROLEMIA: ICD-10-CM

## 2025-01-16 DIAGNOSIS — Z95.2 HISTORY OF TRANSCATHETER AORTIC VALVE REPLACEMENT (TAVR): ICD-10-CM

## 2025-01-16 DIAGNOSIS — I25.83 CORONARY ARTERY DISEASE DUE TO LIPID RICH PLAQUE: ICD-10-CM

## 2025-01-16 DIAGNOSIS — I10 ESSENTIAL HYPERTENSION: ICD-10-CM

## 2025-01-16 DIAGNOSIS — I35.0 AORTIC VALVE STENOSIS, NONRHEUMATIC: Primary | ICD-10-CM

## 2025-01-16 DIAGNOSIS — I25.10 CORONARY ARTERY DISEASE DUE TO LIPID RICH PLAQUE: ICD-10-CM

## 2025-01-16 PROCEDURE — 3078F DIAST BP <80 MM HG: CPT | Performed by: INTERNAL MEDICINE

## 2025-01-16 PROCEDURE — G8419 CALC BMI OUT NRM PARAM NOF/U: HCPCS | Performed by: INTERNAL MEDICINE

## 2025-01-16 PROCEDURE — G8427 DOCREV CUR MEDS BY ELIG CLIN: HCPCS | Performed by: INTERNAL MEDICINE

## 2025-01-16 PROCEDURE — 3074F SYST BP LT 130 MM HG: CPT | Performed by: INTERNAL MEDICINE

## 2025-01-16 PROCEDURE — 1123F ACP DISCUSS/DSCN MKR DOCD: CPT | Performed by: INTERNAL MEDICINE

## 2025-01-16 PROCEDURE — 1036F TOBACCO NON-USER: CPT | Performed by: INTERNAL MEDICINE

## 2025-01-16 PROCEDURE — 99214 OFFICE O/P EST MOD 30 MIN: CPT | Performed by: INTERNAL MEDICINE

## 2025-01-16 PROCEDURE — 1159F MED LIST DOCD IN RCRD: CPT | Performed by: INTERNAL MEDICINE

## 2025-01-18 ASSESSMENT — PATIENT HEALTH QUESTIONNAIRE - PHQ9
1. LITTLE INTEREST OR PLEASURE IN DOING THINGS: NOT AT ALL
SUM OF ALL RESPONSES TO PHQ9 QUESTIONS 1 & 2: 0
SUM OF ALL RESPONSES TO PHQ QUESTIONS 1-9: 0
2. FEELING DOWN, DEPRESSED OR HOPELESS: NOT AT ALL

## 2025-01-20 ASSESSMENT — PATIENT HEALTH QUESTIONNAIRE - PHQ9
1. LITTLE INTEREST OR PLEASURE IN DOING THINGS: NOT AT ALL
2. FEELING DOWN, DEPRESSED OR HOPELESS: NOT AT ALL
SUM OF ALL RESPONSES TO PHQ9 QUESTIONS 1 & 2: 0

## 2025-01-27 ENCOUNTER — OFFICE VISIT (OUTPATIENT)
Dept: FAMILY MEDICINE CLINIC | Age: 85
End: 2025-01-27

## 2025-01-27 VITALS
DIASTOLIC BLOOD PRESSURE: 76 MMHG | RESPIRATION RATE: 14 BRPM | BODY MASS INDEX: 26.52 KG/M2 | SYSTOLIC BLOOD PRESSURE: 130 MMHG | HEART RATE: 77 BPM | HEIGHT: 68 IN | OXYGEN SATURATION: 98 % | WEIGHT: 175 LBS

## 2025-01-27 DIAGNOSIS — I48.0 PAF (PAROXYSMAL ATRIAL FIBRILLATION) (HCC): ICD-10-CM

## 2025-01-27 DIAGNOSIS — I10 ESSENTIAL HYPERTENSION: ICD-10-CM

## 2025-01-27 DIAGNOSIS — E11.65 TYPE 2 DIABETES MELLITUS WITH HYPERGLYCEMIA, UNSPECIFIED WHETHER LONG TERM INSULIN USE (HCC): Primary | ICD-10-CM

## 2025-01-27 DIAGNOSIS — Z23 ENCOUNTER FOR VACCINATION: ICD-10-CM

## 2025-01-27 DIAGNOSIS — M65.341 TRIGGER RING FINGER OF RIGHT HAND: ICD-10-CM

## 2025-01-27 DIAGNOSIS — E78.2 MIXED HYPERLIPIDEMIA: ICD-10-CM

## 2025-01-27 DIAGNOSIS — D68.69 SECONDARY HYPERCOAGULABLE STATE (HCC): ICD-10-CM

## 2025-01-27 LAB
ALBUMIN SERPL-MCNC: 4.5 G/DL (ref 3.4–5)
ALBUMIN/GLOB SERPL: 1.8 {RATIO} (ref 1.1–2.2)
ALP SERPL-CCNC: 150 U/L (ref 40–129)
ALT SERPL-CCNC: 35 U/L (ref 10–40)
ANION GAP SERPL CALCULATED.3IONS-SCNC: 10 MMOL/L (ref 3–16)
AST SERPL-CCNC: 34 U/L (ref 15–37)
BILIRUB SERPL-MCNC: 0.8 MG/DL (ref 0–1)
BUN SERPL-MCNC: 20 MG/DL (ref 7–20)
CALCIUM SERPL-MCNC: 10.3 MG/DL (ref 8.3–10.6)
CHLORIDE SERPL-SCNC: 100 MMOL/L (ref 99–110)
CHOLEST SERPL-MCNC: 140 MG/DL (ref 0–199)
CO2 SERPL-SCNC: 29 MMOL/L (ref 21–32)
CREAT SERPL-MCNC: 0.9 MG/DL (ref 0.8–1.3)
CREAT UR-MCNC: 57.4 MG/DL (ref 39–259)
GFR SERPLBLD CREATININE-BSD FMLA CKD-EPI: 85 ML/MIN/{1.73_M2}
GLUCOSE SERPL-MCNC: 106 MG/DL (ref 70–99)
HBA1C MFR BLD: 6.5 %
HDLC SERPL-MCNC: 71 MG/DL (ref 40–60)
LDLC SERPL CALC-MCNC: 56 MG/DL
MICROALBUMIN UR DL<=1MG/L-MCNC: <1.2 MG/DL
MICROALBUMIN/CREAT UR: NORMAL MG/G (ref 0–30)
POTASSIUM SERPL-SCNC: 4.9 MMOL/L (ref 3.5–5.1)
PROT SERPL-MCNC: 7 G/DL (ref 6.4–8.2)
SODIUM SERPL-SCNC: 139 MMOL/L (ref 136–145)
TRIGL SERPL-MCNC: 65 MG/DL (ref 0–150)
TSH SERPL DL<=0.005 MIU/L-ACNC: 2.75 UIU/ML (ref 0.27–4.2)
VLDLC SERPL CALC-MCNC: 13 MG/DL

## 2025-01-27 RX ORDER — AMOXICILLIN 500 MG/1
CAPSULE ORAL
Qty: 4 CAPSULE | Refills: 2 | Status: SHIPPED | OUTPATIENT
Start: 2025-01-27

## 2025-01-27 RX ORDER — EMPAGLIFLOZIN AND METFORMIN HYDROCHLORIDE 12.5; 5 MG/1; MG/1
2 TABLET ORAL DAILY
Qty: 180 TABLET | Refills: 3 | Status: SHIPPED | OUTPATIENT
Start: 2025-01-27

## 2025-01-27 RX ORDER — METHYLPREDNISOLONE ACETATE 80 MG/ML
80 INJECTION, SUSPENSION INTRA-ARTICULAR; INTRALESIONAL; INTRAMUSCULAR; SOFT TISSUE ONCE
Status: COMPLETED | OUTPATIENT
Start: 2025-01-27 | End: 2025-01-27

## 2025-01-27 RX ORDER — DILTIAZEM HYDROCHLORIDE 240 MG/1
240 CAPSULE, COATED, EXTENDED RELEASE ORAL DAILY
Qty: 90 CAPSULE | Refills: 3 | Status: SHIPPED | OUTPATIENT
Start: 2025-01-27

## 2025-01-27 RX ORDER — LISINOPRIL AND HYDROCHLOROTHIAZIDE 12.5; 2 MG/1; MG/1
TABLET ORAL
Qty: 90 TABLET | Refills: 3 | Status: SHIPPED | OUTPATIENT
Start: 2025-01-27

## 2025-01-27 RX ADMIN — METHYLPREDNISOLONE ACETATE 80 MG: 80 INJECTION, SUSPENSION INTRA-ARTICULAR; INTRALESIONAL; INTRAMUSCULAR; SOFT TISSUE at 13:19

## 2025-01-27 SDOH — ECONOMIC STABILITY: FOOD INSECURITY: WITHIN THE PAST 12 MONTHS, YOU WORRIED THAT YOUR FOOD WOULD RUN OUT BEFORE YOU GOT MONEY TO BUY MORE.: NEVER TRUE

## 2025-01-27 SDOH — ECONOMIC STABILITY: FOOD INSECURITY: WITHIN THE PAST 12 MONTHS, THE FOOD YOU BOUGHT JUST DIDN'T LAST AND YOU DIDN'T HAVE MONEY TO GET MORE.: NEVER TRUE

## 2025-01-27 NOTE — PROGRESS NOTES
dry.      Capillary Refill: Capillary refill takes less than 2 seconds.   Neurological:      Mental Status: He is alert.       Diabetic foot exam  Visual inspection:  Deformity/amputation: absent  Skin lesions/pre-ulcerative calluses: absent (non-ulcerative callus at the lateral side of left foot)  Edema: right- negative, left- negative    Sensory exam:  Monofilament sensation: normal  (minimum of 5 random plantar locations tested, avoiding callused areas - > 1 area with absence of sensation is + for neuropathy)    Plus at least one of the following:  Pulses: normal,   Pinprick: Intact  Proprioception: Intact      Joint Aspiration/Injection    Date/Time: 1/27/2025 9:39 AM    Performed by: Antonio Wyatt MD  Authorized by: Antonio Wyatt MD    Consent:     Consent obtained:  Verbal    Consent given by:  Patient    Risks, benefits, and alternatives were discussed: yes      Risks discussed:  Bleeding  Location:     Location:  Finger    Finger:  Ring finger    Ring finger joint:  R ring MCP (MCP and PIP)  Procedure details:     Needle gauge: 27 G.    Ultrasound guidance: no      Approach:  Anterior    Steroid injected: yes    Post-procedure details:     Dressing:  Adhesive bandage    Procedure completion:  Tolerated well, no immediate complications      BP Readings from Last 3 Encounters:   01/27/25 130/76   01/16/25 118/70   07/17/24 126/72     Wt Readings from Last 3 Encounters:   01/27/25 79.4 kg (175 lb)   01/16/25 79.4 kg (175 lb)   07/17/24 74.8 kg (165 lb)         The Body mass index is 26.61 kg/m²..       Plan was discussed with patient, who agrees. All questions were answered to patient's understanding and satisfaction.    I spent 43 minutes face-to-face with the patient, over half in discussion of the diagnosis and the importance of compliance with the treatment plan.      Antonio Wyatt MD  1/27/2025 10:11 AM

## 2025-01-27 NOTE — ASSESSMENT & PLAN NOTE
Chronic, at goal (stable), Continue Diltiazem 240 mg daily and Lisinopril-HCTZ 20-12.5 mg daily. Check CMP today.

## 2025-01-27 NOTE — ASSESSMENT & PLAN NOTE
Chronic, not at goal (unstable), Physical exam consistent with trigger finger of the right 4th finger. In-office steroid injection performed.

## 2025-01-27 NOTE — ASSESSMENT & PLAN NOTE
Chronic, at goal (stable), Well-controlled with A1c of 6.5% as of 1/27/2025. Continue Synjardy  mg daily. Check CMP, urine microalbumin/Cr ratio today.

## 2025-02-08 DIAGNOSIS — E78.2 MIXED HYPERLIPIDEMIA: ICD-10-CM

## 2025-02-10 RX ORDER — ATORVASTATIN CALCIUM 80 MG/1
80 TABLET, FILM COATED ORAL NIGHTLY
Qty: 90 TABLET | Refills: 2 | Status: SHIPPED | OUTPATIENT
Start: 2025-02-10

## 2025-03-25 DIAGNOSIS — E11.65 TYPE 2 DIABETES MELLITUS WITH HYPERGLYCEMIA, UNSPECIFIED WHETHER LONG TERM INSULIN USE (HCC): ICD-10-CM

## 2025-03-25 RX ORDER — LANCETS 33 GAUGE
EACH MISCELLANEOUS
Qty: 100 EACH | Refills: 3 | Status: SHIPPED | OUTPATIENT
Start: 2025-03-25

## 2025-06-24 ENCOUNTER — TELEPHONE (OUTPATIENT)
Dept: FAMILY MEDICINE CLINIC | Age: 85
End: 2025-06-24

## 2025-06-24 DIAGNOSIS — E11.65 TYPE 2 DIABETES MELLITUS WITH HYPERGLYCEMIA, UNSPECIFIED WHETHER LONG TERM INSULIN USE (HCC): ICD-10-CM

## 2025-06-24 RX ORDER — BLOOD-GLUCOSE METER
EACH MISCELLANEOUS
Qty: 100 STRIP | Refills: 3 | Status: SHIPPED | OUTPATIENT
Start: 2025-06-24

## 2025-06-24 NOTE — TELEPHONE ENCOUNTER
Patient is calling because he needs his test strips refill - he test 1 time a day - [employer] GLUCOSE TEST STRIPS.    Please give him a call back.     [facility] - [address] - phone no. [phone]

## 2025-07-11 NOTE — PROGRESS NOTES
tablet TAKE 1 TABLET EVERY DAY 1/27/25  Yes Antonio Wyatt MD   amoxicillin (AMOXIL) 500 MG capsule Take 4 capsules 1 hour prior to dental procedure 1/27/25  Yes Antonio Wyatt MD   aspirin EC 81 MG EC tablet Take 1 tablet by mouth daily 3/16/23  Yes Antonio Bergeron MD   glucose monitoring (FREESTYLE FREEDOM) kit 1 kit by Does not apply route daily 12/29/22  Yes Florentin Chavez MD   latanoprost (XALATAN) 0.005 % ophthalmic solution Place 1 drop into both eyes nightly 6/3/12  Yes Rey Carlton MD   Cholecalciferol (VITAMIN D) 2000 UNITS CAPS capsule 1 capsule daily With food   Yes Provider, MD Eve   Niacin 1000 MG TBCR 1,000 mg daily.   Yes Provider, MD Eve   labetalol (NORMODYNE) 200 MG tablet Take 1 tablet by mouth every 12 hours 7/2/22 8/3/22  Anabela Almanza APRN - [facility]   hydroCHLOROthiazide (HYDRODIURIL) 25 MG tablet Take 1 tablet by mouth daily 7/3/22 9/2/22  Anabela Almanza APRN - CNS   enalapril (VASOTEC) 20 MG tablet One tablet twice daily for blood pressure 3/31/22 9/2/22  Florentin Chavez MD   Omega-3 Fatty Acids (FISH OIL) 1200 MG CAPS Take 1 capsule by mouth daily.  8/3/22  Provider, MD Eve     Past Medical History:   Diagnosis Date    Aortic regurgitation 01/01/2009    Aortic stenosis     Arthritis     Pt says in his right ankle    Asthma     Atrial fibrillation (HCC) 08 /2022    CAD (coronary artery disease)     Cataract     Diabetes ([facility])     Environmental allergies     Hearing loss 02 /2023    Heart murmur     Hypercholesterolemia     Hypertension     Impaired fasting glucose     Osteoarthritis ?    right ankle    Prostate cancer (HCC) 01/01/2006    [facility]    Type 2 diabetes mellitus without complication ([facility]) 01 / 2023      Past Surgical History:   Procedure Laterality Date    AORTIC VALVE REPLACEMENT N/A 08/02/2022    TRANSCATHETER AORTIC VALVE REPLACEMENT FEMORAL APPROACH performed by Antonio Bergeron MD at [facility]    AORTIC VALVE REPLACEMENT N/A

## 2025-07-17 ENCOUNTER — OFFICE VISIT (OUTPATIENT)
Dept: CARDIOLOGY CLINIC | Age: 85
End: 2025-07-17
Payer: MEDICARE

## 2025-07-17 VITALS
HEIGHT: 68 IN | OXYGEN SATURATION: 98 % | HEART RATE: 78 BPM | SYSTOLIC BLOOD PRESSURE: 118 MMHG | TEMPERATURE: 96.8 F | WEIGHT: 174.8 LBS | DIASTOLIC BLOOD PRESSURE: 78 MMHG | BODY MASS INDEX: 26.49 KG/M2

## 2025-07-17 DIAGNOSIS — I44.7 LBBB (LEFT BUNDLE BRANCH BLOCK): ICD-10-CM

## 2025-07-17 DIAGNOSIS — I35.0 AORTIC STENOSIS, SEVERE: Primary | ICD-10-CM

## 2025-07-17 DIAGNOSIS — I25.10 CORONARY ARTERY DISEASE DUE TO CALCIFIED CORONARY LESION: ICD-10-CM

## 2025-07-17 DIAGNOSIS — I25.84 CORONARY ARTERY DISEASE DUE TO CALCIFIED CORONARY LESION: ICD-10-CM

## 2025-07-17 DIAGNOSIS — I10 ESSENTIAL HYPERTENSION: ICD-10-CM

## 2025-07-17 DIAGNOSIS — I48.0 PAF (PAROXYSMAL ATRIAL FIBRILLATION) (HCC): ICD-10-CM

## 2025-07-17 PROCEDURE — G8427 DOCREV CUR MEDS BY ELIG CLIN: HCPCS | Performed by: INTERNAL MEDICINE

## 2025-07-17 PROCEDURE — 1159F MED LIST DOCD IN RCRD: CPT | Performed by: INTERNAL MEDICINE

## 2025-07-17 PROCEDURE — 1123F ACP DISCUSS/DSCN MKR DOCD: CPT | Performed by: INTERNAL MEDICINE

## 2025-07-17 PROCEDURE — 3078F DIAST BP <80 MM HG: CPT | Performed by: INTERNAL MEDICINE

## 2025-07-17 PROCEDURE — G2211 COMPLEX E/M VISIT ADD ON: HCPCS | Performed by: INTERNAL MEDICINE

## 2025-07-17 PROCEDURE — 99214 OFFICE O/P EST MOD 30 MIN: CPT | Performed by: INTERNAL MEDICINE

## 2025-07-17 PROCEDURE — 93000 ELECTROCARDIOGRAM COMPLETE: CPT | Performed by: INTERNAL MEDICINE

## 2025-07-17 PROCEDURE — G8419 CALC BMI OUT NRM PARAM NOF/U: HCPCS | Performed by: INTERNAL MEDICINE

## 2025-07-17 PROCEDURE — 3074F SYST BP LT 130 MM HG: CPT | Performed by: INTERNAL MEDICINE

## 2025-07-17 PROCEDURE — 1036F TOBACCO NON-USER: CPT | Performed by: INTERNAL MEDICINE

## 2025-07-17 RX ORDER — LANOLIN ALCOHOL/MO/W.PET/CERES
1000 CREAM (GRAM) TOPICAL DAILY
COMMUNITY

## 2025-07-18 SDOH — HEALTH STABILITY: PHYSICAL HEALTH: ON AVERAGE, HOW MANY DAYS PER WEEK DO YOU ENGAGE IN MODERATE TO STRENUOUS EXERCISE (LIKE A BRISK WALK)?: 3 DAYS

## 2025-07-18 SDOH — HEALTH STABILITY: PHYSICAL HEALTH: ON AVERAGE, HOW MANY MINUTES DO YOU ENGAGE IN EXERCISE AT THIS LEVEL?: 20 MIN

## 2025-07-18 ASSESSMENT — PATIENT HEALTH QUESTIONNAIRE - PHQ9
1. LITTLE INTEREST OR PLEASURE IN DOING THINGS: NOT AT ALL
SUM OF ALL RESPONSES TO PHQ QUESTIONS 1-9: 0
SUM OF ALL RESPONSES TO PHQ QUESTIONS 1-9: 0
2. FEELING DOWN, DEPRESSED OR HOPELESS: NOT AT ALL
SUM OF ALL RESPONSES TO PHQ QUESTIONS 1-9: 0
SUM OF ALL RESPONSES TO PHQ QUESTIONS 1-9: 0

## 2025-07-18 ASSESSMENT — LIFESTYLE VARIABLES
HOW OFTEN DO YOU HAVE SIX OR MORE DRINKS ON ONE OCCASION: 1
HOW OFTEN DO YOU HAVE A DRINK CONTAINING ALCOHOL: MONTHLY OR LESS
HOW MANY STANDARD DRINKS CONTAINING ALCOHOL DO YOU HAVE ON A TYPICAL DAY: 1
HOW OFTEN DO YOU HAVE A DRINK CONTAINING ALCOHOL: 2
HOW MANY STANDARD DRINKS CONTAINING ALCOHOL DO YOU HAVE ON A TYPICAL DAY: 1 OR 2

## 2025-07-28 ENCOUNTER — OFFICE VISIT (OUTPATIENT)
Dept: FAMILY MEDICINE CLINIC | Age: 85
End: 2025-07-28

## 2025-07-28 VITALS
WEIGHT: 176 LBS | DIASTOLIC BLOOD PRESSURE: 82 MMHG | HEART RATE: 60 BPM | OXYGEN SATURATION: 97 % | SYSTOLIC BLOOD PRESSURE: 124 MMHG | BODY MASS INDEX: 26.67 KG/M2 | HEIGHT: 68 IN

## 2025-07-28 DIAGNOSIS — E78.2 MIXED HYPERLIPIDEMIA: ICD-10-CM

## 2025-07-28 DIAGNOSIS — E11.65 TYPE 2 DIABETES MELLITUS WITH HYPERGLYCEMIA, UNSPECIFIED WHETHER LONG TERM INSULIN USE (HCC): ICD-10-CM

## 2025-07-28 DIAGNOSIS — G89.29 CHRONIC RIGHT SHOULDER PAIN: ICD-10-CM

## 2025-07-28 DIAGNOSIS — M25.511 CHRONIC RIGHT SHOULDER PAIN: ICD-10-CM

## 2025-07-28 DIAGNOSIS — M65.341 ACQUIRED TRIGGER FINGER OF RIGHT RING FINGER: ICD-10-CM

## 2025-07-28 DIAGNOSIS — Z00.00 MEDICARE ANNUAL WELLNESS VISIT, SUBSEQUENT: Primary | ICD-10-CM

## 2025-07-28 DIAGNOSIS — I50.22 CHRONIC SYSTOLIC (CONGESTIVE) HEART FAILURE (HCC): ICD-10-CM

## 2025-07-28 DIAGNOSIS — I10 ESSENTIAL HYPERTENSION: ICD-10-CM

## 2025-07-28 LAB — HBA1C MFR BLD: 6.6 %

## 2025-07-28 NOTE — PROGRESS NOTES
Medicare Annual Wellness Visit    Wilfredo Renae is here for Medicare AWV (MEDICARE ANNUAL WELLNESS VISIT)    Assessment & Plan  Medicare annual wellness visit, subsequent  - Healthcare topics addressed as below  -Fasting labs up-to-date  -Continue medications as ordered  -Continue follow-up with cardiology and EP  -Follow-up in 6 months, or sooner if needed         Type 2 diabetes mellitus with hyperglycemia, unspecified whether long term insulin use (HCC)   Controlled.  A1c 6.6 percent.  Continue Synjardy as ordered.  Follow-up in 6 months, or sooner if needed.  Up-to-date on microalbuminuria testing and foot exam.    Orders:    POCT glycosylated hemoglobin (Hb A1C)    Chronic systolic (congestive) heart failure (HCC)   Controlled.  Follows with cardiology for management.  Continue lisinopril-hydrochlorothiazide, diltiazem, atorvastatin, Synjardy, and Eliquis.  Follow-up in 6 months         Essential hypertension   Controlled.  Follows with cardiology for management.  Continue lisinopril-hydrochlorothiazide, diltiazem, atorvastatin, Synjardy, and Eliquis.  Follow-up in 6 months         Mixed hyperlipidemia   Controlled.  Follows with cardiology for management.  Continue lisinopril-hydrochlorothiazide, diltiazem, atorvastatin, Synjardy, and Eliquis.  Follow-up in 6 months         Acquired trigger finger of right ring finger   No improvement with injection performed at last appointment.  Declines intervention at this time.  Will place referral to hand specialist whenever patient desires.  Follow-up in 6 months, or sooner if needed.         Chronic right shoulder pain   Likely arthritic.  Patient declined additional intervention at this time.  Follow-up in 6 months.  Recommended ROM exercises.                       Return in about 6 months (around 1/28/2026) for Follow up Diabetes 40 min.     Subjective     History of Present Illness  The patient is an 84-year-old male presenting today for an annual wellness

## 2025-07-28 NOTE — PATIENT INSTRUCTIONS
recommends aspirin, take the amount directed each day. Make sure you take aspirin and not another kind of pain reliever, such as acetaminophen (Tylenol).   When should you call for help?   Call 911 if you have symptoms of a heart attack. These may include:    Chest pain or pressure, or a strange feeling in the chest.     Sweating.     Shortness of breath.     Pain, pressure, or a strange feeling in the back, neck, jaw, or upper belly or in one or both shoulders or arms.     Lightheadedness or sudden weakness.     A fast or irregular heartbeat.   After you call 911, the  may tell you to chew 1 adult-strength or 2 to 4 low-dose aspirin. Wait for an ambulance. Do not try to drive yourself.  Watch closely for changes in your health, and be sure to contact your doctor if you have any problems.  Where can you learn more?  Go to https://www.[employer].net/patientEd and enter F075 to learn more about \"A Healthy Heart: Care Instructions.\"  Current as of: July 31, 2024  Content Version: 14.5  © 9755-7256 [employer].   Care instructions adapted under license by [employer]. If you have questions about a medical condition or this instruction, always ask your healthcare professional. [employer], disclaims any warranty or liability for your use of this information.    Personalized Preventive Plan for Wilfredo Renae - 7/28/2025  Medicare offers a range of preventive health benefits. Some of the tests and screenings are paid in full while other may be subject to a deductible, co-insurance, and/or copay.  Some of these benefits include a comprehensive review of your medical history including lifestyle, illnesses that may run in your family, and various assessments and screenings as appropriate.  After reviewing your medical record and screening and assessments performed today your provider may have ordered immunizations, labs, imaging, and/or referrals for you.  A list of these orders (if

## 2025-07-28 NOTE — ASSESSMENT & PLAN NOTE
Controlled.  Follows with cardiology for management.  Continue lisinopril-hydrochlorothiazide, diltiazem, atorvastatin, Synjardy, and Eliquis.  Follow-up in 6 months

## 2025-07-28 NOTE — ASSESSMENT & PLAN NOTE
Controlled.  A1c 6.6 percent.  Continue Synjardy as ordered.  Follow-up in 6 months, or sooner if needed.  Up-to-date on microalbuminuria testing and foot exam.    Orders:    POCT glycosylated hemoglobin (Hb A1C)

## (undated) DEVICE — DRESSING WND SM W2.5XL4IN BRTH W/ CATH SECUREMENT AND

## (undated) DEVICE — OPTIFOAM GENTLE LIQUITRAP, SACRUM, 7"X7": Brand: MEDLINE

## (undated) DEVICE — SET CATH 20GA L1.75IN RAD ART POLYUR RADPQ W/ INTEGR

## (undated) DEVICE — SUPPORT WRST AD W3.5XL9IN DIA14.5IN ART SFT ADJ HK AND LOOP

## (undated) DEVICE — SET ADMIN PRIMING 67ML L105IN NVENT 180UM FLTR 3 RLER CLMP

## (undated) DEVICE — NEEDLE HYPO 18GA L1.5IN THN WALL PIVOTING SHLD BVL ORIENTED